# Patient Record
Sex: FEMALE | Race: WHITE | NOT HISPANIC OR LATINO | Employment: FULL TIME | URBAN - METROPOLITAN AREA
[De-identification: names, ages, dates, MRNs, and addresses within clinical notes are randomized per-mention and may not be internally consistent; named-entity substitution may affect disease eponyms.]

---

## 2017-05-04 ASSESSMENT — ENCOUNTER SYMPTOMS
MEMORY LOSS: 0
PARALYSIS: 0
CONSTIPATION: 0
DISTURBANCES IN COORDINATION: 0
SEIZURES: 0
RECTAL PAIN: 0
WEAKNESS: 0
TINGLING: 1
BLOATING: 1
VOMITING: 0
JAUNDICE: 0
TREMORS: 0
RECTAL BLEEDING: 0
LOSS OF CONSCIOUSNESS: 0
ABDOMINAL PAIN: 1
HEADACHES: 0
HEARTBURN: 0
DIARRHEA: 0
NAUSEA: 0
NUMBNESS: 1
BLOOD IN STOOL: 0
BOWEL INCONTINENCE: 0
SPEECH CHANGE: 0
DIZZINESS: 0

## 2017-05-17 ASSESSMENT — ENCOUNTER SYMPTOMS
NECK PAIN: 0
SKIN CHANGES: 0
JOINT SWELLING: 0
DISTURBANCES IN COORDINATION: 0
NAIL CHANGES: 0
WEAKNESS: 0
HEARTBURN: 0
ALTERED TEMPERATURE REGULATION: 0
LIGHT-HEADEDNESS: 0
CLAUDICATION: 0
NUMBNESS: 1
TREMORS: 0
DIARRHEA: 0
FEVER: 0
EYE IRRITATION: 0
SPUTUM PRODUCTION: 0
FATIGUE: 0
HEMATURIA: 0
JAUNDICE: 0
DECREASED APPETITE: 0
BOWEL INCONTINENCE: 0
SEIZURES: 0
FLANK PAIN: 0
SHORTNESS OF BREATH: 0
HYPERTENSION: 0
NECK MASS: 0
MYALGIAS: 0
NAUSEA: 0
VOMITING: 0
LEG PAIN: 0
BREAST PAIN: 0
INCREASED ENERGY: 0
LOSS OF CONSCIOUSNESS: 0
DOUBLE VISION: 0
PALPITATIONS: 0
CONSTIPATION: 0
PANIC: 0
BREAST MASS: 0
COUGH DISTURBING SLEEP: 0
RECTAL PAIN: 0
ORTHOPNEA: 0
SNORES LOUDLY: 0
EXERCISE INTOLERANCE: 0
HALLUCINATIONS: 0
SORE THROAT: 0
BRUISES/BLEEDS EASILY: 0
HOT FLASHES: 0
RECTAL BLEEDING: 0
POLYPHAGIA: 0
HOARSE VOICE: 0
HEADACHES: 0
MUSCLE WEAKNESS: 0
BACK PAIN: 0
MEMORY LOSS: 0
TINGLING: 1
WEIGHT GAIN: 0
HYPOTENSION: 0
TASTE DISTURBANCE: 0
POOR WOUND HEALING: 0
SLEEP DISTURBANCES DUE TO BREATHING: 0
PARALYSIS: 0
DYSPNEA ON EXERTION: 0
EYE PAIN: 0
WEIGHT LOSS: 0
SPEECH CHANGE: 0
CHILLS: 0
SYNCOPE: 0
DECREASED CONCENTRATION: 0
SINUS CONGESTION: 0
LEG SWELLING: 0
TROUBLE SWALLOWING: 0
SMELL DISTURBANCE: 0
POSTURAL DYSPNEA: 0
INSOMNIA: 0
DIFFICULTY URINATING: 0
POLYDIPSIA: 0
EYE REDNESS: 0
RESPIRATORY PAIN: 0
COUGH: 0
DECREASED LIBIDO: 0
DEPRESSION: 0
TACHYCARDIA: 0
DIZZINESS: 0
DYSURIA: 0
NIGHT SWEATS: 0
ARTHRALGIAS: 0
NERVOUS/ANXIOUS: 0
STIFFNESS: 0
WHEEZING: 0
MUSCLE CRAMPS: 0
SINUS PAIN: 0
SWOLLEN GLANDS: 0
EYE WATERING: 0
HEMOPTYSIS: 0
BLOOD IN STOOL: 0

## 2017-05-18 ENCOUNTER — ONCOLOGY VISIT (OUTPATIENT)
Dept: ONCOLOGY | Facility: CLINIC | Age: 32
End: 2017-05-18
Attending: NURSE PRACTITIONER
Payer: COMMERCIAL

## 2017-05-18 VITALS
DIASTOLIC BLOOD PRESSURE: 82 MMHG | HEART RATE: 83 BPM | SYSTOLIC BLOOD PRESSURE: 123 MMHG | TEMPERATURE: 98.1 F | BODY MASS INDEX: 22.82 KG/M2 | RESPIRATION RATE: 16 BRPM | WEIGHT: 154.1 LBS | HEIGHT: 69 IN | OXYGEN SATURATION: 97 %

## 2017-05-18 DIAGNOSIS — C53.9 MALIGNANT NEOPLASM OF CERVIX, UNSPECIFIED SITE (H): Primary | ICD-10-CM

## 2017-05-18 PROCEDURE — 99213 OFFICE O/P EST LOW 20 MIN: CPT | Mod: ZP | Performed by: NURSE PRACTITIONER

## 2017-05-18 PROCEDURE — 88175 CYTOPATH C/V AUTO FLUID REDO: CPT | Performed by: NURSE PRACTITIONER

## 2017-05-18 PROCEDURE — 99212 OFFICE O/P EST SF 10 MIN: CPT | Mod: ZF

## 2017-05-18 ASSESSMENT — PAIN SCALES - GENERAL: PAINLEVEL: NO PAIN (0)

## 2017-05-18 ASSESSMENT — ENCOUNTER SYMPTOMS
BLOATING: 0
ABDOMINAL PAIN: 0

## 2017-05-18 NOTE — LETTER
2017       RE: Erika Ziegler  [address]  [facility] [address]     Dear Colleague,    Thank you for referring your patient, Erika Ziegler, to the [facility]. Please see a copy of my visit note below.                Follow Up Notes on Referred Patient    Date: 2017       Dr. Saritha Rodney MD  [facility]  [address]  [place] [address]       RE: Erika Ziegler  : 1985  HUMBERTO: 2017    Dear Dr. 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 She is here today for a surveillance visit and annual pap.      Course to date:   Erika had some post coital bleeding and was seen by Dr. iSlva for her annual visit. On pelvic examination she was noted to have a friable cervical mass for which a pap smear was obtained. She then underwent a colposcopy with biopsies taken with above diagnosis made. She would like to have children in the future and the current plan is to preserve her ovaries and undergo oocyte and/embryo preservation with surrogate carrier.   Pap/colpo history:   5/10/4: NIL   05: LSIL   3/2006 colpo with mild dysplasia   06: LSIL pap   07: ASCUS +HPV   07: LSIL   2008: colpo with mild dysplasia   08 & 2009: LSIL   2009: ANDRZEJ I-II   3/2010: colpo ANDRZEJ I   11/5/10, 11, 12: NIL   13: ASC-H, JERRI   2013: colpo with poorly differentiated carcinoma with neuroendocrine differentiation and partial tumor necrosis   13: PET CT with 6.5 x 5.2 cm cervical mass; tiny focus of increased metabolism in the right midpelvis laterally. The ureter at this level is difficult to follow. This may represent some activity in the distal ureter through it is difficult to completely  exclude activity in a small non enlarged pelvic lymph node. Chest lear, no other evidence of mets.   5/1/13: Met with reproductive endocrinology to discuss fertility options. Per their recommendation, given the cervical cancer diagnosis and the size of the lesion, the patient is not a suitable candidate for transvaginal oocyte retrieval. Transabdominal ultrasound was performed in addition to transvaginal ultrasound, to evaluate for possible transabdominal oocyte retrieval, with ovaries positioned low in the pelvis precluding safe transabdominal oocyte retrieval. Discussed option of ovarian tissue cryopreservation and ovarian translocation prior to radiation therapy.   5/1/13: MR PELVIS IMPRESSION:  1. 5.1 x 3.1 x 5.9 cm enhancing cervical mass extending of the posterior aspect of the cervix not involving the vagina or uterus no evidence of parametrial spread.  2. Two small nonspecific pelvic lymph nodes, 1.0 x 0.6 cm left pelvic lymph node series 6 image 6, 0.6 x 1.0 cm right pelvic lymph node on image 6.  5/13/13: laparoscopy, left ovarian transposition, right salpingo oophorectomy (reproductive medicine taking right ovary after surgery)   5/20/13-6/17/13: Cycle #1-3 Etoposide/Cisplatin; began EBRT   6/24/13: Insertion of High Dose Rate Tandem and Ring for Iridium-192 implant. Pt tolerated well.   7/9/13: Completed brachytherapy  8/2/13-8/21/13: Cycle #1-2 Taxol/Carbo  9/11/13 PET/CT IMPRESSION:  1. No evidence of FDG avid malignancy in the neck, chest, abdomen, or pelvis.  2. Inflammatory changes in the presacral and perirectal spaces, likely post radiation change.  9/13/13-10/4/13: Cycle #3-4 Taxol/Carbo  12/16/13: PET/CT IMPRESSION:  1. No evidence of hypermetabolic activity within the neck, chest, abdomen, or pelvis to suggest active or metastatic disease.  2. Persistent inflammatory changes within the low pelvis, most compatible with posttreatment change, without associated hypermetabolic activity to suggest  "local recurrence.  12/18/13: recovering relatively well from treatment. She still has neuropathy in her feet. It is constant, annoying tingling and numbness in her toes. She has some relief with gabapentin and B6/L-glutamine. She also still has some \"digestive upset\" since finishing radiation therapy, has diarrhea especially in the morning. She also had some chest discomfort last week, but this has since resolved and she attributes it to anxiety after meeting a patient with similar cancer to hers that has metastasized to lungs. She also continues to have some bleeding and discomfort with use of vaginal dilator. She still takes OCPs and has NOT been skipping sugar pill week. She does not feel she experiences any menopausal symptoms on these off weeks but has also not been monitoring it closely. Amenorrhea still.   3/17/14: CT C/A/P IMPRESSION: No CT scan findings in the chest, abdomen, or pelvis to indicate recurrent or metastatic tumor. Unchanged mild free fluid in the pelvis.  3/19/14 pap NIL  6/10/14: CT C/A/P Impression:   1. No evidence of recurrent or metastatic cervical cancer in the chest, abdomen, or pelvis.   2. New subtle wall thickening of the small bowel loops in the low abdomen, likely sequelae of prior radiation.   3. Stable nonspecific pulmonary nodules measuring up to 3 mm since at least 9/11/2013. Continued followup recommended until 12 month stability is documented. No new pulmonary nodule.   6/11/14: Pap NIL.  9/2/14: Pap NIL, HPV negative. CT cap showed: No evidence of recurrence or metastatic lesion in chest, abdomen, or pelvis. Stable sub-4 mm pulmonary nodules.  12/2/14: Pap LSIL, HPV negative. CT cap showed: Impression:   1. No evidence of recurrent local or metastatic disease in the chest, abdomen, or pelvis.  2. Stable sub-4 mm pulmonary nodule on the left. No new pulmonary nodules.  2/17/15: CT C/A/P: IMPRESSION:   1. Left lower lobe 2 mm nodule unchanged since initial imaging on " 9/11/2013.  2. No evidence of recurrent local or metastatic disease in the chest, abdomen, or pelvis.  3/5/15: Pap ASC-H, LSIL also present, HPV 18 postive.    4/4/15: ED visit for 4 days of abdominal pain, increasing in severity with increased frequency of urination and bowel movements over the past 2 days as well. She also complains of abdominal distention. Gyn onc consult in hospital did not recommend CT at that time unless symptoms worsen. Discharged same day.   XRay abdomen: Nonobstructive bowel gas pattern. No free intraperitoneal air.     4/16/15: Colpo done. No aceto-white changes identified. No biopsies done. Plan to repeat pap in June as well as CT.      6/9/15: CT cap verbal preliminary report by Dr. Eric is no change in pulmonary nodules and no evidence of recurrence/metastatic disease. Pap pending.      Addendum: CT cap IMPRESSION:   1. Indeterminate 12 mm hypodensity within the uterine fundus may represent fluid within the endometrial canal secondary to cervical stenosis. Underlying uterine lesion not excluded. Initially, a dedicated pelvic ultrasound is recommended for further assessment.  2. No evidence of metastatic disease.  3. 2 mm nodules in the left lung are unchanged since at least 9/11/2013, and are statistically benign.      Plan for U/S for further evaluation of uterus.     7/2/15: U/S results pending. Verbal report per Dr. Guajardo shows a solid fibroid like area which is not fluid; recommend f/u with additional imaging.      U/S final IMPRESSION:   1. Relatively well-defined small mixed echogenicity myometrial mass in the uterine fundus. Fibroid could have this appearance, however given patient's history of cervical malignancy and radiation, consider a 3-6 month followup to ensure stability.  2. Ovaries are not identified.  3. Trace free fluid in the pelvis.     9/1/15: CT cap IMPRESSION:    1. No evidence of metastatic disease in the chest, abdomen, or pelvis.  2. Stable uterine fundus  hypodensity most consistent with submucosal fibroid as described on pelvic ultrasound 7/2/2015. However, given the patient's history of cervical cancer, short-term followup ultrasound in 3-6 months is recommended.  3. Postsurgical changes of right salpingo-oophorectomy and left  Salpingectomy.     9/1/15: pap NIL, neg HPV.      12/4/15: pelvic ultrasound FINDINGS:  The uterus measures 5.3 x 3.4 x 2.0 cm. The endometrium is within normal limits and measures 2.0 mm. There is no free fluid in the pelvis. Redemonstration of a heterogeneous appearing circumscribed submucosal mass in the uterine fundus measuring 10 x 8 x 6 mm, previously 8 x 13 x 9 mm. Unchanged calcification in the lower uterine segment. No new masses are identified. The right ovary is surgically absent. The left ovary was not visualized. No adnexal masses.  No focal abnormality of the visualized portions of the bladder.  IMPRESSION:    Mild decrease in the submucosal mass in the uterine fundus from 7/2/2015, which most likely represents a benign fibroid. No other suspicious masses are identified.     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      6//2016: Pap NIL. 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     12/6/16: CT cap IMPRESSION:   1. In this patient with history of cervical cancer there is no evidence of recurrent or metastatic disease in the chest, abdomen and  pelvis.   2. Tiny pulmonary nodules are unchanged since 9/11/2013.    5/18/17: Pap pending.       Today she comes to clinic reporting she was been having some abdominal issues about 3 weeks ago but that has resolved. She denies any vaginal bleeding, no changes in  her bowel or bladder habits, no nausea/emesis, no lower extremity edema, and no difficulties eating or sleeping. She denies any abdominal discomfort/bloating, no fevers or chills, and no chest pain or shortness of breath. She is using her dilator about once every 3-4 weeks and is intermittently sexually active; she does have some discomfort with this given the shortened length of the vagina. She is using a lubricant and does try to change positions which does help. She tapered down on her Gabapentin and reports her neuropathy did not worsen; she has previously tried Lyrica which did not help. She looked in to acupuncture and laser light therapy but this was cost prohibitive. She was taking B6 and L-glutamine but is not now. She and her  are just starting the adoption process again now that their son, Jeremiah, is one. She continues to take OCP and has questions about why she is on this.        Review of Systems     Constitutional:  Negative for fever, chills, weight loss, weight gain, fatigue, decreased appetite, night sweats, recent stressors, height gain, height loss, post-operative complications, incisional pain, hallucinations, increased energy, hyperactivity and confused.   HENT:  Negative for ear pain, hearing loss, tinnitus, nosebleeds, trouble swallowing, hoarse voice, mouth sores, sore throat, ear discharge, tooth pain, gum tenderness, taste disturbance, smell disturbance, hearing aid, bleeding gums, dry mouth, sinus pain, sinus congestion and neck mass.    Eyes:  Negative for double vision, pain, redness, eye pain, decreased vision, eye watering, eye bulging, eye dryness, flashing lights, spots, floaters, strabismus, tunnel vision, jaundice and eye irritation.   Respiratory:   Negative for cough, hemoptysis, sputum production, shortness of breath, wheezing, sleep disturbances due to breathing, snores loudly, respiratory pain, dyspnea on exertion, cough disturbing sleep and postural dyspnea.     Cardiovascular:  Negative for chest pain, dyspnea on exertion, palpitations, orthopnea, claudication, leg swelling, fingers/toes turn blue, hypertension, hypotension, syncope, history of heart murmur, chest pain on exertion, chest pain at rest, pacemaker, few scattered varicosities, leg pain, sleep disturbances due to breathing, tachycardia, light-headedness, exercise intolerance and edema.   Gastrointestinal:  Negative for heartburn, nausea, vomiting, abdominal pain, diarrhea, constipation, blood in stool, melena, rectal pain, bloating, hemorrhoids, bowel incontinence, jaundice, rectal bleeding, coffee ground emesis and change in stool.   Genitourinary:  Negative for bladder incontinence, dysuria, urgency, hematuria, flank pain, vaginal discharge, difficulty urinating, genital sores, dyspareunia, decreased libido, nocturia, voiding less frequently, arousal difficulty, abnormal vaginal bleeding, excessive menstruation, menstrual changes, hot flashes, vaginal dryness and postmenopausal bleeding.   Musculoskeletal:  Negative for myalgias, back pain, joint swelling, arthralgias, stiffness, muscle cramps, neck pain, bone pain, muscle weakness and fracture.   Skin:  Negative for nail changes, itching, poor wound healing, rash, hair changes, skin changes, acne, warts, poor wound healing, scarring, flaky skin, Raynaud's phenomenon, sensitivity to sunlight and skin thickening.   Neurological:  Positive for tingling and numbness. Negative for dizziness, tremors, speech change, seizures, loss of consciousness, weakness, light-headedness, headaches, disturbances in coordination, memory loss, difficulty walking and paralysis.   Endo/Heme:  Negative for anemia, swollen glands and bruises/bleeds easily.   Psychiatric/Behavioral:  Negative for depression, hallucinations, memory loss, decreased concentration, mood swings and panic attacks.    Breast:  Negative for breast discharge, breast mass, breast pain and nipple retraction.    Endocrine:  Negative for altered temperature regulation, polyphagia, polydipsia, unwanted hair growth and change in facial hair.          Past Medical History:    Past Medical History:   Diagnosis Date     Cervix cancer (H) 4/2013    neuroendocrine         Past Surgical History:    Past Surgical History:   Procedure Laterality Date     BIOPSY       EXAM UNDER ANESTHESIA, INSERT JOESPH SLEEVE, UTERINE PLACEMENT OF TANDEM AND RING FOR RAD, ULTRASOUND  6/24/2013    Procedure: EXAM UNDER ANESTHESIA, INSERT JOESPH SLEEVE, UTERINE PLACEMENT OF TANDEM AND RING FOR RADIATION, ULTRASOUND GUIDED;  Pelvic Exam, Insert JOESPH Sleeve with Ultrasound Guidance and Place Tandem Ring for High Dose Radiation;  Surgeon: Roxy Brar MD;  Location: [facility] OR     LAPAROSCOPIC SALPINGO-OOPHORECTOMY  5/13/2013    Procedure: LAPAROSCOPIC SALPINGO-OOPHORECTOMY;  Laparoscopy, Left  salpingectomy,Ovarian Transposition, Right Salpingo Oophorectomy , Anesthesia General with block;  Surgeon: Deanna Salinas MD;  Location: [facility] OR     wisdom teeth           Health Maintenance Due   Topic Date Due     TETANUS IMMUNIZATION (SYSTEM ASSIGNED)  10/25/2003       Current Medications:     Current Outpatient Prescriptions   Medication Sig Dispense Refill     ALPRAZolam (XANAX) 0.25 MG tablet Take 1 tablet (0.25 mg) by mouth 3 times daily as needed for anxiety 30 tablet 0     norgestrel-ethinyl estradiol (LO/OVRAL) 0.3-30 MG-MCG per tablet Take 1 tablet by mouth daily 28 tablet 11     gabapentin (NEURONTIN) 300 MG capsule Take 2 capsules (600 mg) by mouth 4 times daily 240 capsule 11     Cholecalciferol (VITAMIN D PO) Take 1,000 mg by mouth daily       pyridoxine (VITAMIN B-6) 100 MG tablet Take 100 mg by mouth 2 times daily       L-Glutamine 500 MG TABS Take 1,000 mg by mouth 2 times daily           Allergies:      No Known Allergies     Social History:     Social History   Substance Use Topics     Smoking status: Never Smoker     Smokeless tobacco: Never  "Used     Alcohol use 0.0 oz/week     0 Standard drinks or equivalent per week      Comment: 3 per week       History   Drug Use No         Family History:       Family History   Problem Relation Age of Onset     CANCER Maternal Grandfather      leukemia     Breast Cancer No family hx of      Cancer - colorectal No family hx of          Physical Exam:     /82  Pulse 83  Temp 98.1  F (36.7  C) (Oral)  Resp 16  Ht 1.753 m (5' 9\")  Wt 69.9 kg (154 lb 1.6 oz)  SpO2 97%  BMI 22.76 kg/m2  Body mass index is 22.76 kg/(m^2).    General Appearance: healthy and alert, no distress     HEENT: no thyromegaly, no palpable nodules or masses        Cardiovascular: regular rate and rhythm, no gallops, rubs or murmurs     Respiratory: lungs clear, no rales, rhonchi or wheezes, normal diaphragmatic excursion    Musculoskeletal: extremities non tender and without edema    Skin: no lesions or rashes     Neurological: normal gait, no gross defects     Psychiatric: appropriate mood and affect                               Hematological: normal cervical, supraclavicular and inguinal lymph nodes     Gastrointestinal:       abdomen soft, non-tender, non-distended, no organomegaly or masses    Genitourinary: External genitalia and urethral meatus appears normal.  Vagina is smooth without nodularity or masses; foreshortened.  Cervix not clearly visualized. Bimanual exam reveal no masses, nodularity or fullness.  Recto-vaginal exam confirms these findings. Pap collected.       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 She is here today for a surveillance visit and annual pap.     20 minutes were spent with this patient, over 50% of that time was spent in symptom management, treatment planning and in counseling and " coordination of care.    Plan:     1.)        Patient to continue to be seen every 6 months for surveillance until 10/2018. Her annual pap was collected today. Discussed our group no longer obtains HPV testing in women with cervical cancer. Reviewed recommendations from [facility] against performing colposcopy in patients treated for cervical cancer with Pap tests of LSIL or less. Colposcopy for LSIL in this group does not detect recurrence unless there is a visible lesion.  Will plan in imaging in about 3 months as this will be 9 months since her last imaging was done; she will call to schedule this. Reviewed signs and symptoms for when she should contact the clinic or seek additional care. Patient to contact the clinic with any questions or concerns in the interim.     2.) Discussed rationale for being on OCPs and answered all of her questions to the best of my ability.     3.) Labs and/or tests ordered include:  Pap. CT cap.      4.) Health maintenance issues addressed today include annual health maintenance and non-gynecologic issues with PCP.    AMOL Whittaker, WHNP-BC, ANP-BC  Women's Health Nurse Practitioner  Adult Nurse Pracitioner  Gynecologic Oncology    CC  Patient Care Team:  Saritha Rodney MD as PCP - General (Family Practice)

## 2017-05-18 NOTE — MR AVS SNAPSHOT
After Visit Summary   5/18/2017    Erika Ziegler    MRN: [id number]           Patient Information     Date Of Birth          1985        Visit Information        Provider Department      5/18/2017 10:30 AM Cheyanne Burleson APRN CNP [facility]        Today's Diagnoses     Malignant neoplasm of cervix, unspecified site (H)    -  1       Follow-ups after your visit        Follow-up notes from your care team     Return in about 6 months (around 11/18/2017).      Future tests that were ordered for you today     Open Future Orders        Priority Expected Expires Ordered    CT Chest/Abdomen/Pelvis w Contrast Routine  5/18/2018 5/18/2017            Who to contact     If you have questions or need follow up information about today's clinic visit or your schedule please contact [facility] directly at [phone].  Normal or non-critical lab and imaging results will be communicated to you by payasUgymhart, letter or phone within 4 business days after the clinic has received the results. If you do not hear from us within 7 days, please contact the clinic through payasUgymhart or phone. If you have a critical or abnormal lab result, we will notify you by phone as soon as possible.  Submit refill requests through [employer] or call your pharmacy and they will forward the refill request to us. Please allow 3 business days for your refill to be completed.          Additional Information About Your Visit        MyChart Information     [employer] gives you secure access to your electronic health record. If you see a primary care provider, you can also send messages to your care team and make appointments. If you have questions, please call your primary care clinic.  If you do not have a primary care provider, please call [phone] and they will assist you.        Care EveryWhere ID     This is your Care EveryWhere ID. This could be used by other organizations to access your  "[place] medical records  [id number]        Your Vitals Were     Pulse Temperature Respirations Height Pulse Oximetry BMI (Body Mass Index)    83 98.1  F (36.7  C) (Oral) 16 1.753 m (5' 9\") 97% 22.76 kg/m2       Blood Pressure from Last 3 Encounters:   05/18/17 123/82   12/06/16 130/88   06/15/16 122/82    Weight from Last 3 Encounters:   05/18/17 69.9 kg (154 lb 1.6 oz)   12/06/16 69.7 kg (153 lb 9.6 oz)   06/15/16 68 kg (150 lb)              We Performed the Following     Pap imaged thin layer diagnostic only        Primary Care Provider Office Phone # Fax #    Saritha Rodney [phone]       [facility] [address] [facility] [address]  [facility] [address]        Thank you!     Thank you for choosing [facility]  for your care. Our goal is always to provide you with excellent care. Hearing back from our patients is one way we can continue to improve our services. Please take a few minutes to complete the written survey that you may receive in the mail after your visit with us. Thank you!             Your Updated Medication List - Protect others around you: Learn how to safely use, store and throw away your medicines at www.disposemymeds.org.          This list is accurate as of: 5/18/17 11:17 AM.  Always use your most recent med list.                   Brand Name Dispense Instructions for use    ALPRAZolam 0.25 MG tablet    XANAX    30 tablet    Take 1 tablet (0.25 mg) by mouth 3 times daily as needed for anxiety       gabapentin 300 MG capsule    NEURONTIN    240 capsule    Take 2 capsules (600 mg) by mouth 4 times daily       L-Glutamine 500 MG Tabs      Take 1,000 mg by mouth 2 times daily       norgestrel-ethinyl estradiol 0.3-30 MG-MCG per tablet    LO/OVRAL    28 tablet    Take 1 tablet by mouth daily       pyridoxine 100 MG tablet    VITAMIN B-6     Take 100 mg by mouth 2 times daily       VITAMIN D PO      Take 1,000 mg by mouth daily         "

## 2017-05-18 NOTE — NURSING NOTE
"Oncology Rooming Note    May 18, 2017 10:39 AM   Erika Ziegler is a 31 year old female who presents for:    Chief Complaint   Patient presents with     Oncology Clinic Visit     return patient visit for 6 month f/u related to Cervical ca (H)     Initial Vitals: /82  Pulse 83  Temp 98.1  F (36.7  C) (Oral)  Resp 16  Ht 1.753 m (5' 9\")  Wt 69.9 kg (154 lb 1.6 oz)  SpO2 97%  BMI 22.76 kg/m2 Estimated body mass index is 22.76 kg/(m^2) as calculated from the following:    Height as of this encounter: 1.753 m (5' 9\").    Weight as of this encounter: 69.9 kg (154 lb 1.6 oz). Body surface area is 1.84 meters squared.  No Pain (0) Comment: Data Unavailable   No LMP recorded. Patient is not currently having periods (Reason: UNKNOWN).  Allergies reviewed: Yes  Medications reviewed: Yes    Medications: Medication refills not needed today.  Pharmacy name entered into mEgo:    [id number] - [place], [place] - [address] [facility] - [place], [place] - [address] [facility] - [place], [place] - [address]    Clinical concerns: none floyd khanna was notified.    5 minutes for nursing intake (face to face time)     Brett Thomas CMA              "

## 2017-05-22 LAB
COPATH REPORT: NORMAL
PAP: NORMAL

## 2017-05-25 DIAGNOSIS — F41.9 ANXIETY: ICD-10-CM

## 2017-05-25 RX ORDER — ALPRAZOLAM 0.25 MG
0.25 TABLET ORAL 3 TIMES DAILY PRN
Qty: 30 TABLET | Refills: 0 | Status: SHIPPED | OUTPATIENT
Start: 2017-05-25 | End: 2018-08-08

## 2017-05-25 NOTE — TELEPHONE ENCOUNTER
[facility] calling for refill of alprazolam for the patient  Last visit with MD 5/18/17  Last order date    ALPRAZolam (XANAX) 0.25 MG tablet 30 tablet 0 12/6/2016  --   Sig: Take 1 tablet (0.25 mg) by mouth 3 times daily as needed for anxiety     Please advise on refills for patient

## 2017-06-13 DIAGNOSIS — C53.9 MALIGNANT NEOPLASM OF CERVIX, UNSPECIFIED SITE (H): ICD-10-CM

## 2017-06-27 DIAGNOSIS — C53.1 MALIGNANT NEOPLASM OF EXOCERVIX (H): ICD-10-CM

## 2017-07-03 RX ORDER — GABAPENTIN 300 MG/1
CAPSULE ORAL
Qty: 240 CAPSULE | Refills: 11 | OUTPATIENT
Start: 2017-07-03

## 2017-07-10 ENCOUNTER — MYC MEDICAL ADVICE (OUTPATIENT)
Dept: NEUROLOGY | Facility: CLINIC | Age: 32
End: 2017-07-10

## 2017-07-10 ENCOUNTER — MYC REFILL (OUTPATIENT)
Dept: ONCOLOGY | Facility: CLINIC | Age: 32
End: 2017-07-10

## 2017-07-10 DIAGNOSIS — G62.9 NEUROPATHY: Primary | ICD-10-CM

## 2017-07-10 DIAGNOSIS — C53.9 MALIGNANT NEOPLASM OF CERVIX, UNSPECIFIED SITE (H): ICD-10-CM

## 2017-07-10 DIAGNOSIS — C53.1 MALIGNANT NEOPLASM OF EXOCERVIX (H): ICD-10-CM

## 2017-07-10 NOTE — TELEPHONE ENCOUNTER
Message from [employer]:  Original authorizing provider: AMOL Tomlinson CNP    Erika Ziegler would like a refill of the following medications:  norgestrel-ethinyl estradiol (LO/OVRAL) 0.3-30 MG-MCG per tablet [AMOL Tomlinson CNP]    Preferred pharmacy: [facility], [facility], [place] - [address] [place] [address]    Comment:  Magdi Edward, Can you renew this for the year? I know that's what Dr. Salinas did so the pharmacy didn't have to call every month. Thanks, hope you're doing great! Chas

## 2017-07-10 NOTE — TELEPHONE ENCOUNTER
patient calling for refill of loovral for the patient  Last visit with MD 5/18/17  Last order date    norgestrel-ethinyl estradiol (LO/OVRAL) 0.3-30 MG-MCG per tablet 28 tablet 11 6/13/2017  No      Sig: Take 1 tablet by mouth daily     Please advise on refills for patient

## 2017-07-14 NOTE — TELEPHONE ENCOUNTER
Patient has an appointment with  10/5/17 and will need GBP refilled through that time.  Current prescription was written 6/28 for 60 days with 1 refill.  This should be adequate for patient assuming no dose changes

## 2017-07-24 DIAGNOSIS — C53.9 CERVICAL CANCER (H): Primary | ICD-10-CM

## 2017-09-26 ENCOUNTER — PRE VISIT (OUTPATIENT)
Dept: NEUROLOGY | Facility: CLINIC | Age: 32
End: 2017-09-26

## 2017-09-26 NOTE — TELEPHONE ENCOUNTER
1.  Date/reason for appt:  10/05/17   Neuropathy    2.  Referring provider:  Dr Babcock, Internal    3.  Call to patient (Yes / No - short description):  No, referred    Records reviewed.  All records are in Epic

## 2017-10-02 DIAGNOSIS — C53.1 MALIGNANT NEOPLASM OF EXOCERVIX (H): ICD-10-CM

## 2017-10-02 RX ORDER — GABAPENTIN 300 MG/1
CAPSULE ORAL
Qty: 240 CAPSULE | Refills: 1 | Status: CANCELLED | OUTPATIENT
Start: 2017-10-02

## 2017-10-02 NOTE — TELEPHONE ENCOUNTER
last appointment 06/15/16  next appointment 10/05/17 with Dr Villareal (transferring   Will be out of medication on Wed

## 2017-10-04 ENCOUNTER — MYC MEDICAL ADVICE (OUTPATIENT)
Dept: ONCOLOGY | Facility: CLINIC | Age: 32
End: 2017-10-04

## 2017-10-05 ENCOUNTER — OFFICE VISIT (OUTPATIENT)
Dept: NEUROLOGY | Facility: CLINIC | Age: 32
End: 2017-10-05

## 2017-10-05 VITALS
WEIGHT: 154 LBS | HEART RATE: 81 BPM | SYSTOLIC BLOOD PRESSURE: 129 MMHG | HEIGHT: 69 IN | DIASTOLIC BLOOD PRESSURE: 80 MMHG | BODY MASS INDEX: 22.81 KG/M2

## 2017-10-05 DIAGNOSIS — C53.1 MALIGNANT NEOPLASM OF EXOCERVIX (H): ICD-10-CM

## 2017-10-05 DIAGNOSIS — T45.1X5A CHEMOTHERAPY-INDUCED NEUROPATHY (H): Primary | ICD-10-CM

## 2017-10-05 DIAGNOSIS — G62.0 CHEMOTHERAPY-INDUCED NEUROPATHY (H): Primary | ICD-10-CM

## 2017-10-05 RX ORDER — GABAPENTIN 300 MG/1
600 CAPSULE ORAL 3 TIMES DAILY
Qty: 540 CAPSULE | Refills: 1 | Status: SHIPPED | OUTPATIENT
Start: 2017-10-05 | End: 2019-09-03

## 2017-10-05 ASSESSMENT — PAIN SCALES - GENERAL: PAINLEVEL: MILD PAIN (3)

## 2017-10-05 NOTE — LETTER
10/5/2017       RE: Erika Ziegler  [address]  [facility] [address]     Dear Colleague,    Thank you for referring your patient, Erika Ziegler, to the [facility] NEUROLOGY at [facility]. Please see a copy of my visit note below.    2017        Shima Babcock MD   [facility]    [address]  [facility] [address]   [place] [id number]      Saritha Rodney MD   [facility]    [address] [facility] [address]   [place] [id number]      Cheyanne Burleson, APRN, CNP   [facility]    [address] [place] [address]      Deanna Salinas MD   [facility]    [address] [facility] [address]   [place] [id number]      RE: Erika Ziegler    MRN: [id number]    : 1985              Dear Rashaun Mark Ghebre and Ms. Burleson:      I had the pleasure to see Ms. Ziegler at the [facility] today.  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  She has a history of cervical cancer, status post radiation and chemotherapy, which she completed at the end of .  She was treated with 4 cycles of Taxol and carboplatin.  She noticed the symptoms of neuropathy at the end of the last cycle, in 2013.  They consisted of numbness, tingling and pain in her hands and feet. The numbness and tingling in hands disappeared a few months after stopping chemotherapy, but the paresthesias in her feet persisted. Her symptoms are generally well controlled on gabapentin 600 mg 4 times a day.  She has a frequent achy sensation in her feet.  She does not always remember to take it 4 times a day so in the last few months she has reduced it to 3 times a day.  However, as long as she takes her gabapentin, she can sleep comfortably and she does not seem to have major limitations in her activities of daily living.  She does not have any autonomic symptoms such as orthostatic hypotension, fainting,  gastroparesis, etc.  She has issues with bowel and bladder control, which she relates to her radiation therapy.  She does not drop things from her her hands and denies weakness, balance problems, or falls.  She had mild balance difficulties at the end of 2013.  She feels her symptoms have improved compared to then.   She drinks 2-3 alcoholic drinks a week at most.  She does not smoke.  She does not have diabetes.  There is no family history of neuropathy.  She previously tried Lyrica which caused side effects related to mood and confusion.  She has not been on Cymbalta, Effexor, nortriptyline or other antidepressants.  She took multivitamins, vitamin B6 and L-glutamine at the instruction of her oncologist, but she does not believe they made any difference to her neuropathy.      PAST MEDICAL HISTORY, ALLERGIES, FAMILY HISTORY, SOCIAL HISTORY, REVIEW OF SYSTEMS, AND MEDICATIONS:  As outlined in previous Epic notes.        On physical exam, her blood pressure is 129/80, pulse 81 and regular, weight 69.9 kg, height is 175, pain is 3 out of 10, BMI 22.74.  On neuro exam, she is awake, alert, oriented x3.  Cranial nerve examination II-XII is normal.  Motor exam shows normal strength, tone and muscle bulk throughout, except for inability to abduct her toes.  Reflexes are 2-3 brisk in the upper extremities, 3 at the knees with crossed adductor response and 2 and easily elicitable at the ankles.  Plantar responses are bilaterally neutral.  Vibration is mildly reduced at the toes and the ankles- 8 seconds at the toes bilaterally and 9 at the ankles, normal at the index fingers, 21 seconds on the right and 23 at the left.  Position sensations is normal at toes and fingers.  Pinprick is reduced at the tips of the toes compared to the proximal foot, but it is normal from the ankle up.  Pinprick and tactile sensation is normal at the tips of the fingers.  Coordination is intact in finger-to-nose.  There is no tremor or  adventitious movement.  She is able to rise from a chair with arms crossed on the chest without help.  Romberg is negative and she can do 4-5 steps of tandem with fairly good stability.        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  Her symptoms appear, in my opinion, fairly well controlled on gabapentin 600 mg and she does not need to take it 4 times a day. 3 times is enough. She wonders if she could taper the gabapentin.  I explained to her that she could try so by reducing it by 1 capsule a week, until she finds the minimal dose that is required to keep her symptoms under control.  Of course, there is no secure way to predict whether a lower dose of gabapentin will be sufficient or not, and the patient can experiment herself. I do not have any additional suggestions for her treatment.  I told her that there is no agent, whether vitamin B6, glutamine or other, that is known to reverse the course of established chemotherapy neuropathy.  She seems to understand that.  I will see her in followup as necessary.  I refilled her gabapentin until 2017.  From there on, I would kindly request that her oncologist or her primary care doctor further refills that drug.  I am happy to see her in the future if she has any worsening symptoms related to her neuropathy.      Thanks for allowing me to participate in her care. Total time spent for patient care 25 minutes; more than half was counseling.       Sincerely,        MD OLENA Anderson MD             D: 10/05/2017 08:24   T: 10/05/2017 09:08   MT: tb      Name:     JARRELL LYNCH   MRN:      [id number]        Account:      [id number]   :      1985           Service Date: 10/05/2017      Document: [id number]        Again, thank you for allowing me to participate in the care of your patient.      Sincerely,    Olena Villareal MD

## 2017-10-05 NOTE — PROGRESS NOTES
2017        Shima Babcock MD   [facility]    [address]  [facility] [address]   [place] [address]      Saritha Rodney MD   [facility]    [address] [facility] [address]   [place] [address]      Cheyanne Burleson, APRN, CNP   [facility]    [address] [place], [place] [id number]      Deanna Salinas MD   [facility]    [address] [facility] [address]   [place] [address]      RE: Erika Ziegler    MRN: [id number]    : 1985              Dear Rashaun Mark Ghebre and Ms. Burleson:      I had the pleasure to see Ms. Ziegler at the [facility] today.  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  She has a history of cervical cancer, status post radiation and chemotherapy, which she completed at the end of .  She was treated with 4 cycles of Taxol and carboplatin.  She noticed the symptoms of neuropathy at the end of the last cycle, in 2013.  They consisted of numbness, tingling and pain in her hands and feet. The numbness and tingling in hands disappeared a few months after stopping chemotherapy, but the paresthesias in her feet persisted. Her symptoms are generally well controlled on gabapentin 600 mg 4 times a day.  She has a frequent achy sensation in her feet.  She does not always remember to take it 4 times a day so in the last few months she has reduced it to 3 times a day.  However, as long as she takes her gabapentin, she can sleep comfortably and she does not seem to have major limitations in her activities of daily living.  She does not have any autonomic symptoms such as orthostatic hypotension, fainting, gastroparesis, etc.  She has issues with bowel and bladder control, which she relates to her radiation therapy.  She does not drop things from her her hands and denies weakness, balance problems, or falls.  She had mild balance difficulties at the end of .  She feels her symptoms  have improved compared to then.   She drinks 2-3 alcoholic drinks a week at most.  She does not smoke.  She does not have diabetes.  There is no family history of neuropathy.  She previously tried Lyrica which caused side effects related to mood and confusion.  She has not been on Cymbalta, Effexor, nortriptyline or other antidepressants.  She took multivitamins, vitamin B6 and L-glutamine at the instruction of her oncologist, but she does not believe they made any difference to her neuropathy.      PAST MEDICAL HISTORY, ALLERGIES, FAMILY HISTORY, SOCIAL HISTORY, REVIEW OF SYSTEMS, AND MEDICATIONS:  As outlined in previous Epic notes.        On physical exam, her blood pressure is 129/80, pulse 81 and regular, weight 69.9 kg, height is 175, pain is 3 out of 10, BMI 22.74.  On neuro exam, she is awake, alert, oriented x3.  Cranial nerve examination II-XII is normal.  Motor exam shows normal strength, tone and muscle bulk throughout, except for inability to abduct her toes.  Reflexes are 2-3 brisk in the upper extremities, 3 at the knees with crossed adductor response and 2 and easily elicitable at the ankles.  Plantar responses are bilaterally neutral.  Vibration is mildly reduced at the toes and the ankles- 8 seconds at the toes bilaterally and 9 at the ankles, normal at the index fingers, 21 seconds on the right and 23 at the left.  Position sensations is normal at toes and fingers.  Pinprick is reduced at the tips of the toes compared to the proximal foot, but it is normal from the ankle up.  Pinprick and tactile sensation is normal at the tips of the fingers.  Coordination is intact in finger-to-nose.  There is no tremor or adventitious movement.  She is able to rise from a chair with arms crossed on the chest without help.  Romberg is negative and she can do 4-5 steps of tandem with fairly good stability.        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  Her symptoms  appear, in my opinion, fairly well controlled on gabapentin 600 mg and she does not need to take it 4 times a day. 3 times is enough. She wonders if she could taper the gabapentin.  I explained to her that she could try so by reducing it by 1 capsule a week, until she finds the minimal dose that is required to keep her symptoms under control.  Of course, there is no secure way to predict whether a lower dose of gabapentin will be sufficient or not, and the patient can experiment herself. I do not have any additional suggestions for her treatment.  I told her that there is no agent, whether vitamin B6, glutamine or other, that is known to reverse the course of established chemotherapy neuropathy.  She seems to understand that.  I will see her in followup as necessary.  I refilled her gabapentin until 2017.  From there on, I would kindly request that her oncologist or her primary care doctor further refills that drug.  I am happy to see her in the future if she has any worsening symptoms related to her neuropathy.      Thanks for allowing me to participate in her care. Total time spent for patient care 25 minutes; more than half was counseling.       Sincerely,        MD OLENA Anderson MD             D: 10/05/2017 08:24   T: 10/05/2017 09:08   MT: [email or web address]      Name:     JARRELL LYNCH   MRN:      [id number]        Account:      [id number]   :      1985           Service Date: 10/05/2017      Document: [id number]

## 2017-10-05 NOTE — PATIENT INSTRUCTIONS
You can reduce your gabapentin dose by 1 pill every week, until you find the minimal dose that works for your symptoms. Of course, if symptoms worsen, you can always get back to 2 capsules three times a day.  I will refill your gabapentin until March next year. I would kindly ask your oncologist or primary care doctor to refill it from there on.  Please do not hesitate to call with any worsening symptoms. [phone]. Thank you

## 2017-10-05 NOTE — MR AVS SNAPSHOT
After Visit Summary   10/5/2017    Erika Ziegler    MRN: [id number]           Patient Information     Date Of Birth          1985        Visit Information        Provider Department      10/5/2017 8:00 AM Pancho Villareal MD [facility] Neurology        Today's Diagnoses     Chemotherapy-induced neuropathy (H)    -  1    MALIG NEOPLASM EXOCERVIX          Care Instructions    You can reduce your gabapentin dose by 1 pill every week, until you find the minimal dose that works for your symptoms. Of course, if symptoms worsen, you can always get back to 2 capsules three times a day.  I will refill your gabapentin until March next year. I would kindly ask your oncologist or primary care doctor to refill it from there on.  Please do not hesitate to call with any worsening symptoms. [phone]. Thank you              Follow-ups after your visit        Your next 10 appointments already scheduled     Nov 30, 2017  8:30 AM CST   (Arrive by 8:15 AM)   Return Active Treatment with AMOL Tomlinson CNP   [facility]    [address] [place] [id number] [phone]              Who to contact     Please call your clinic at [phone] to:    Ask questions about your health    Make or cancel appointments    Discuss your medicines    Learn about your test results    Speak to your doctor   If you have compliments or concerns about an experience at your clinic, or if you wish to file a complaint, please contact [facility] Patient Relations at [phone] or email us at [email or web address]         Additional Information About Your Visit        MyChart Information     [employer] gives you secure access to your electronic health record. If you see a primary care provider, you can also send messages to your care team and make appointments. If you have questions, please  "call your primary care clinic.  If you do not have a primary care provider, please call [phone] and they will assist you.      [employer] is an electronic gateway that provides easy, online access to your medical records. With [employer], you can request a clinic appointment, read your test results, renew a prescription or communicate with your care team.     To access your existing account, please contact your [facility] or call [phone] for assistance.        Care EveryWhere ID     This is your Care EveryWhere ID. This could be used by other organizations to access your [facility] medical records  [id number]        Your Vitals Were     Pulse Height BMI (Body Mass Index)             81 1.753 m (5' 9\") 22.74 kg/m2          Blood Pressure from Last 3 Encounters:   10/05/17 129/80   05/18/17 123/82   12/06/16 130/88    Weight from Last 3 Encounters:   10/05/17 69.9 kg (154 lb)   05/18/17 69.9 kg (154 lb 1.6 oz)   12/06/16 69.7 kg (153 lb 9.6 oz)              Today, you had the following     No orders found for display         Today's Medication Changes          These changes are accurate as of: 10/5/17  8:19 AM.  If you have any questions, ask your nurse or doctor.               These medicines have changed or have updated prescriptions.        Dose/Directions    gabapentin 300 MG capsule   Commonly known as:  NEURONTIN   This may have changed:  when to take this   Used for:  Chemotherapy-induced neuropathy (H)   Changed by:  Pancho Villareal MD        Dose:  600 mg   Take 2 capsules (600 mg) by mouth 3 times daily   Quantity:  540 capsule   Refills:  1            Where to get your medicines      [facility] [place] [facility] [place], [place] - [address] [facility]  [address] [facility] [address]     Phone:  [phone]     gabapentin 300 MG capsule                Primary Care Provider Office Phone # Fax #    Saritha Rodney MD " [phone]       [address]  [facility] [address]        Equal Access to Services     BERNADETTE MAYER : Pal sergio yap ignacio Burgos, waestebanda luarminda, jamesonta kanaheedda bentley, malu triana laAnujjun cuevas. So [facility] [phone].    ATENCIÓN: Si habla español, tiene a woodward disposición servicios gratuitos de asistencia lingüística. Llame al [phone].    We comply with applicable federal civil rights laws and [place] laws. We do not discriminate on the basis of race, color, national origin, age, disability, sex, sexual orientation, or gender identity.            Thank you!     Thank you for choosing [facility] NEUROLOGY  for your care. Our goal is always to provide you with excellent care. Hearing back from our patients is one way we can continue to improve our services. Please take a few minutes to complete the written survey that you may receive in the mail after your visit with us. Thank you!             Your Updated Medication List - Protect others around you: Learn how to safely use, store and throw away your medicines at www.disposemymeds.org.          This list is accurate as of: 10/5/17  8:19 AM.  Always use your most recent med list.                   Brand Name Dispense Instructions for use Diagnosis    ALPRAZolam 0.25 MG tablet    XANAX    30 tablet    Take 1 tablet (0.25 mg) by mouth 3 times daily as needed for anxiety    Anxiety       gabapentin 300 MG capsule    NEURONTIN    540 capsule    Take 2 capsules (600 mg) by mouth 3 times daily    Chemotherapy-induced neuropathy (H)       iohexol 140 MG/ML Soln solution    OMNIPAQUE    50 mL    Mix entire bottle (50ml) of contast with 600ml (20 ounces) of water and drink half 2 hrs prior to CT scan and half 1 hr prior to scan    Cervical cancer (H)       L-Glutamine 500 MG Tabs      Take 1,000 mg by mouth 2 times daily        norgestrel-ethinyl estradiol 0.3-30 MG-MCG per tablet    LO/OVRAL    28 tablet    Take 1 tablet by  mouth daily    Malignant neoplasm of cervix, unspecified site (H)       pyridoxine 100 MG tablet    VITAMIN B-6     Take 100 mg by mouth 2 times daily        VITAMIN D PO      Take 1,000 mg by mouth daily

## 2017-11-17 ENCOUNTER — MYC MEDICAL ADVICE (OUTPATIENT)
Dept: ONCOLOGY | Facility: CLINIC | Age: 32
End: 2017-11-17

## 2017-11-17 NOTE — TELEPHONE ENCOUNTER
Contacted Erika regarding her questions and encouraged her to contact [facility] to see if she can be seen there regarding high risk pregnancy/infertility. If that does not work, have encouraged her to contact [facility]. Answered all of her questions to the best of my ability.  She voiced appreciation and understanding of the above.

## 2018-02-09 ASSESSMENT — ENCOUNTER SYMPTOMS
TINGLING: 1
NUMBNESS: 1

## 2018-02-19 ASSESSMENT — ENCOUNTER SYMPTOMS
HOARSE VOICE: 0
EYE IRRITATION: 0
HEARTBURN: 0
CHILLS: 0
NAIL CHANGES: 0
WEIGHT GAIN: 0
SINUS PAIN: 0
ABDOMINAL PAIN: 0
VOMITING: 0
RECTAL BLEEDING: 0
HYPERTENSION: 0
BREAST PAIN: 0
EYE WATERING: 0
INSOMNIA: 0
POLYPHAGIA: 0
HOT FLASHES: 0
HYPOTENSION: 0
LEG PAIN: 0
SHORTNESS OF BREATH: 0
WEIGHT LOSS: 0
NUMBNESS: 1
FEVER: 0
POOR WOUND HEALING: 0
EXERCISE INTOLERANCE: 0
SPUTUM PRODUCTION: 0
TASTE DISTURBANCE: 0
TROUBLE SWALLOWING: 0
NERVOUS/ANXIOUS: 0
BRUISES/BLEEDS EASILY: 0
RESPIRATORY PAIN: 0
SMELL DISTURBANCE: 0
ARTHRALGIAS: 0
EYE PAIN: 0
PANIC: 0
MUSCLE WEAKNESS: 0
POLYDIPSIA: 0
FLANK PAIN: 0
RECTAL PAIN: 0
MYALGIAS: 0
BLOATING: 0
DIARRHEA: 0
BACK PAIN: 0
HEMATURIA: 0
SNORES LOUDLY: 0
DIFFICULTY URINATING: 0
BOWEL INCONTINENCE: 0
NIGHT SWEATS: 0
HEMOPTYSIS: 0
SYNCOPE: 0
NECK PAIN: 0
LEG SWELLING: 0
DYSURIA: 0
DEPRESSION: 0
JOINT SWELLING: 0
FATIGUE: 0
BREAST MASS: 0
JAUNDICE: 0
DOUBLE VISION: 0
SLEEP DISTURBANCES DUE TO BREATHING: 0
SWOLLEN GLANDS: 0
NAUSEA: 0
DYSPNEA ON EXERTION: 0
ORTHOPNEA: 0
TINGLING: 1
TACHYCARDIA: 0
ALTERED TEMPERATURE REGULATION: 0
EYE REDNESS: 0
COUGH: 0
CLAUDICATION: 0
DECREASED LIBIDO: 0
NECK MASS: 0
POSTURAL DYSPNEA: 0
STIFFNESS: 0
SINUS CONGESTION: 0
INCREASED ENERGY: 0
CONSTIPATION: 0
WHEEZING: 0
LIGHT-HEADEDNESS: 0
SORE THROAT: 0
SKIN CHANGES: 0
BLOOD IN STOOL: 0
DECREASED APPETITE: 0
PALPITATIONS: 0
HALLUCINATIONS: 0
MUSCLE CRAMPS: 0
DECREASED CONCENTRATION: 0
COUGH DISTURBING SLEEP: 0

## 2018-02-19 NOTE — PROGRESS NOTES
Follow Up Notes on Referred Patient    Date: 2018       Dr. Saritha Rodney, MD  [address] [address]       RE: Erika Ziegler  : 1985  HUM[place]ERT[place]: 2018    Dear Dr. 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 She is here today for a surveillance visit.       Course to date:   Erika had some post coital bleeding and was seen by Dr. Silva for her annual visit. On pelvic examination she was noted to have a friable cervical mass for which a pap smear was obtained. She then underwent a colposcopy with biopsies taken with above diagnosis made. She would like to have children in the future and the current plan is to preserve her ovaries and undergo oocyte and/embryo preservation with surrogate carrier.   Pap/colpo history:   5/10/4: NIL   05: LSIL   3/2006 colpo with mild dysplasia   06: LSIL pap   07: ASCUS +HPV   07: LSIL   2008: colpo with mild dysplasia   08 & 2009: LSIL   2009: ANDRZEJ I-II   3/2010: colpo ANDRZEJ I   11/5/10, 11, 12: NIL   13: ASC-H, JERRI   2013: colpo with poorly differentiated carcinoma with neuroendocrine differentiation and partial tumor necrosis   13: PET CT with 6.5 x 5.2 cm cervical mass; tiny focus of increased metabolism in the right midpelvis laterally. The ureter at this level is difficult to follow. This may represent some activity in the distal ureter through it is difficult to completely exclude activity in a small non enlarged pelvic lymph node. Chest lear, no other evidence of mets.   13: Met with reproductive endocrinology to discuss fertility options. Per their recommendation, given the cervical cancer diagnosis and the size of the lesion, the patient is not a  "suitable candidate for transvaginal oocyte retrieval. Transabdominal ultrasound was performed in addition to transvaginal ultrasound, to evaluate for possible transabdominal oocyte retrieval, with ovaries positioned low in the pelvis precluding safe transabdominal oocyte retrieval. Discussed option of ovarian tissue cryopreservation and ovarian translocation prior to radiation therapy.   5/1/13: MR PELVIS IMPRESSION:  1. 5.1 x 3.1 x 5.9 cm enhancing cervical mass extending of the posterior aspect of the cervix not involving the vagina or uterus no evidence of parametrial spread.  2. Two small nonspecific pelvic lymph nodes, 1.0 x 0.6 cm left pelvic lymph node series 6 image 6, 0.6 x 1.0 cm right pelvic lymph node on image 6.  5/13/13: laparoscopy, left ovarian transposition, right salpingo oophorectomy (reproductive medicine taking right ovary after surgery)   5/20/13-6/17/13: Cycle #1-3 Etoposide/Cisplatin; began EBRT   6/24/13: Insertion of High Dose Rate Tandem and Ring for Iridium-192 implant. Pt tolerated well.   7/9/13: Completed brachytherapy  8/2/13-8/21/13: Cycle #1-2 Taxol/Carbo  9/11/13 PET/CT IMPRESSION:  1. No evidence of FDG avid malignancy in the neck, chest, abdomen, or pelvis.  2. Inflammatory changes in the presacral and perirectal spaces, likely post radiation change.  9/13/13-10/4/13: Cycle #3-4 Taxol/Carbo  12/16/13: PET/CT IMPRESSION:  1. No evidence of hypermetabolic activity within the neck, chest, abdomen, or pelvis to suggest active or metastatic disease.  2. Persistent inflammatory changes within the low pelvis, most compatible with posttreatment change, without associated hypermetabolic activity to suggest local recurrence.  12/18/13: recovering relatively well from treatment. She still has neuropathy in her feet. It is constant, annoying tingling and numbness in her toes. She has some relief with gabapentin and B6/L-glutamine. She also still has some \"digestive upset\" since finishing " radiation therapy, has diarrhea especially in the morning. She also had some chest discomfort last week, but this has since resolved and she attributes it to anxiety after meeting a patient with similar cancer to hers that has metastasized to lungs. She also continues to have some bleeding and discomfort with use of vaginal dilator. She still takes OCPs and has NOT been skipping sugar pill week. She does not feel she experiences any menopausal symptoms on these off weeks but has also not been monitoring it closely. Amenorrhea still.   3/17/14: CT C/A/P IMPRESSION: No CT scan findings in the chest, abdomen, or pelvis to indicate recurrent or metastatic tumor. Unchanged mild free fluid in the pelvis.  3/19/14 pap NIL  6/10/14: CT C/A/P Impression:   1. No evidence of recurrent or metastatic cervical cancer in the chest, abdomen, or pelvis.   2. New subtle wall thickening of the small bowel loops in the low abdomen, likely sequelae of prior radiation.   3. Stable nonspecific pulmonary nodules measuring up to 3 mm since at least 9/11/2013. Continued followup recommended until 12 month stability is documented. No new pulmonary nodule.   6/11/14: Pap NIL.  9/2/14: Pap NIL, HPV negative. CT cap showed: No evidence of recurrence or metastatic lesion in chest, abdomen, or pelvis. Stable sub-4 mm pulmonary nodules.  12/2/14: Pap LSIL, HPV negative. CT cap showed: Impression:   1. No evidence of recurrent local or metastatic disease in the chest, abdomen, or pelvis.  2. Stable sub-4 mm pulmonary nodule on the left. No new pulmonary nodules.  2/17/15: CT C/A/P: IMPRESSION:   1. Left lower lobe 2 mm nodule unchanged since initial imaging on 9/11/2013.  2. No evidence of recurrent local or metastatic disease in the chest, abdomen, or pelvis.  3/5/15: Pap ASC-H, LSIL also present, HPV 18 postive.    4/4/15: ED visit for 4 days of abdominal pain, increasing in severity with increased frequency of urination and bowel movements over  the past 2 days as well. She also complains of abdominal distention. Gyn onc consult in hospital did not recommend CT at that time unless symptoms worsen. Discharged same day.   XRay abdomen: Nonobstructive bowel gas pattern. No free intraperitoneal air.      4/16/15: Colpo done. No aceto-white changes identified. No biopsies done. Plan to repeat pap in June as well as CT.       6/9/15: CT cap verbal preliminary report by Dr. Eric is no change in pulmonary nodules and no evidence of recurrence/metastatic disease. Pap pending.       Addendum: CT cap IMPRESSION:   1. Indeterminate 12 mm hypodensity within the uterine fundus may represent fluid within the endometrial canal secondary to cervical stenosis. Underlying uterine lesion not excluded. Initially, a dedicated pelvic ultrasound is recommended for further assessment.  2. No evidence of metastatic disease.  3. 2 mm nodules in the left lung are unchanged since at least 9/11/2013, and are statistically benign.       Plan for U/S for further evaluation of uterus.      7/2/15: U/S results pending. Verbal report per Dr. Guajardo shows a solid fibroid like area which is not fluid; recommend f/u with additional imaging.       U/S final IMPRESSION:   1. Relatively well-defined small mixed echogenicity myometrial mass in the uterine fundus. Fibroid could have this appearance, however given patient's history of cervical malignancy and radiation, consider a 3-6 month followup to ensure stability.  2. Ovaries are not identified.  3. Trace free fluid in the pelvis.      9/1/15: CT cap IMPRESSION:    1. No evidence of metastatic disease in the chest, abdomen, or pelvis.  2. Stable uterine fundus hypodensity most consistent with submucosal fibroid as described on pelvic ultrasound 7/2/2015. However, given the patient's history of cervical cancer, short-term followup ultrasound in 3-6 months is recommended.  3. Postsurgical changes of right salpingo-oophorectomy and  left  Salpingectomy.      9/1/15: pap NIL, neg HPV.       12/4/15: pelvic ultrasound FINDINGS:  The uterus measures 5.3 x 3.4 x 2.0 cm. The endometrium is within normal limits and measures 2.0 mm. There is no free fluid in the pelvis. Redemonstration of a heterogeneous appearing circumscribed submucosal mass in the uterine fundus measuring 10 x 8 x 6 mm, previously 8 x 13 x 9 mm. Unchanged calcification in the lower uterine segment. No new masses are identified. The right ovary is surgically absent. The left ovary was not visualized. No adnexal masses.  No focal abnormality of the visualized portions of the bladder.  IMPRESSION:    Mild decrease in the submucosal mass in the uterine fundus from 7/2/2015, which most likely represents a benign fibroid. No other suspicious masses are identified.      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       6//2016: Pap NIL. 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      12/6/16: CT cap IMPRESSION:   1. In this patient with history of cervical cancer there is no evidence of recurrent or metastatic disease in the chest, abdomen and pelvis.   2. Tiny pulmonary nodules are unchanged since 9/11/2013.     5/18/17: Pap NIL.  8/14/17: CT cap IMPRESSION: No evidence of recurrent or metastatic disease in the chest, abdomen and pelvis  2/20/18: CT cap IMPRESSION: No evidence of recurrent or metastatic disease in the chest, abdomen or pelvis.          Today she comes to clinic and denies any concerning symptoms. She denies any vaginal bleeding, no changes in her bowel or bladder habits, no nausea/emesis, no lower extremity edema, and no difficulties eating or  sleeping. She denies any abdominal discomfort/bloating, no fevers or chills, and no chest pain or shortness of breath. She has decreased to Gabapentin to 300 mg BID and states her symptoms are managed on this. She denies any issues with IC. She states she has used only about 3 of the Xanax she filled last May. She has paperwork regarding their second adoption which she would like filled out.            Review of Systems     Constitutional:  Negative for fever, chills, weight loss, weight gain, fatigue, decreased appetite, night sweats, recent stressors, height gain, height loss, post-operative complications, incisional pain, hallucinations, increased energy, hyperactivity and confused.   HENT:  Negative for ear pain, hearing loss, tinnitus, nosebleeds, trouble swallowing, hoarse voice, mouth sores, sore throat, ear discharge, tooth pain, gum tenderness, taste disturbance, smell disturbance, hearing aid, bleeding gums, dry mouth, sinus pain, sinus congestion and neck mass.    Eyes:  Negative for double vision, pain, redness, eye pain, decreased vision, eye watering, eye bulging, eye dryness, flashing lights, spots, floaters, strabismus, tunnel vision, jaundice and eye irritation.   Respiratory:   Negative for cough, hemoptysis, sputum production, shortness of breath, wheezing, sleep disturbances due to breathing, snores loudly, respiratory pain, dyspnea on exertion, cough disturbing sleep and postural dyspnea.    Cardiovascular:  Negative for chest pain, dyspnea on exertion, palpitations, orthopnea, claudication, leg swelling, fingers/toes turn blue, hypertension, hypotension, syncope, history of heart murmur, chest pain on exertion, chest pain at rest, pacemaker, few scattered varicosities, leg pain, sleep disturbances due to breathing, tachycardia, light-headedness, exercise intolerance and edema.   Gastrointestinal:  Negative for heartburn, nausea, vomiting, abdominal pain, diarrhea, constipation, blood in stool,  melena, rectal pain, bloating, hemorrhoids, bowel incontinence, jaundice, rectal bleeding, coffee ground emesis and change in stool.   Genitourinary:  Negative for bladder incontinence, dysuria, urgency, hematuria, flank pain, vaginal discharge, difficulty urinating, genital sores, dyspareunia, decreased libido, nocturia, voiding less frequently, arousal difficulty, abnormal vaginal bleeding, excessive menstruation, menstrual changes, hot flashes, vaginal dryness and postmenopausal bleeding.   Musculoskeletal:  Negative for myalgias, back pain, joint swelling, arthralgias, stiffness, muscle cramps, neck pain, bone pain, muscle weakness and fracture.   Skin:  Negative for nail changes, itching, poor wound healing, rash, hair changes, skin changes, acne, warts, poor wound healing, scarring, flaky skin, Raynaud's phenomenon, sensitivity to sunlight and skin thickening.   Neurological:  Positive for tingling and numbness. Negative for light-headedness.   Endo/Heme:  Negative for anemia, swollen glands and bruises/bleeds easily.   Psychiatric/Behavioral:  Negative for depression, hallucinations, decreased concentration, mood swings and panic attacks.    Breast:  Negative for breast discharge, breast mass, breast pain and nipple retraction.   Endocrine:  Negative for altered temperature regulation, polyphagia, polydipsia, unwanted hair growth and change in facial hair.          Past Medical History:    Past Medical History:   Diagnosis Date     Cervix cancer (H) 4/2013    neuroendocrine         Past Surgical History:    Past Surgical History:   Procedure Laterality Date     BIOPSY       EXAM UNDER ANESTHESIA, INSERT JOESPH SLEEVE, UTERINE PLACEMENT OF TANDEM AND RING FOR RAD, ULTRASOUND  6/24/2013    Procedure: EXAM UNDER ANESTHESIA, INSERT JOESPH SLEEVE, UTERINE PLACEMENT OF TANDEM AND RING FOR RADIATION, ULTRASOUND GUIDED;  Pelvic Exam, Insert JOESPH Sleeve with Ultrasound Guidance and Place Tandem Ring for High Dose  "Radiation;  Surgeon: Roxy Brar MD;  Location: [facility] OR     LAPAROSCOPIC SALPINGO-OOPHORECTOMY  5/13/2013    Procedure: LAPAROSCOPIC SALPINGO-OOPHORECTOMY;  Laparoscopy, Left  salpingectomy,Ovarian Transposition, Right Salpingo Oophorectomy , Anesthesia General with block;  Surgeon: Deanna Salinas MD;  Location: [facility] OR     wisdom teeth           Health Maintenance Due   Topic Date Due     TETANUS IMMUNIZATION (SYSTEM ASSIGNED)  10/25/2003     INFLUENZA VACCINE (SYSTEM ASSIGNED)  09/01/2017       Current Medications:     Current Outpatient Prescriptions   Medication Sig Dispense Refill     gabapentin (NEURONTIN) 300 MG capsule Take 2 capsules (600 mg) by mouth 3 times daily 540 capsule 1     norgestrel-ethinyl estradiol (LO/OVRAL) 0.3-30 MG-MCG per tablet Take 1 tablet by mouth daily 28 tablet 11     ALPRAZolam (XANAX) 0.25 MG tablet Take 1 tablet (0.25 mg) by mouth 3 times daily as needed for anxiety 30 tablet 0         Allergies:      No Known Allergies     Social History:     Social History   Substance Use Topics     Smoking status: Never Smoker     Smokeless tobacco: Never Used     Alcohol use 0.0 oz/week     0 Standard drinks or equivalent per week      Comment: 3 per week       History   Drug Use No         Family History:       Family History   Problem Relation Age of Onset     CANCER Maternal Grandfather      leukemia     Breast Cancer No family hx of      Cancer - colorectal No family hx of          Physical Exam:     /83  Pulse 86  Temp 98.2  F (36.8  C) (Oral)  Resp 18  Ht 1.753 m (5' 9\")  Wt 70.8 kg (156 lb 1.6 oz)  SpO2 96%  Breastfeeding? No  BMI 23.05 kg/m2  Body mass index is 23.05 kg/(m^2).    General Appearance: healthy and alert, no distress     HEENT: no thyromegaly, no palpable nodules or masses        Cardiovascular: regular rate and rhythm, no gallops, rubs or murmurs     Respiratory: lungs clear, no rales, rhonchi or wheezes, normal diaphragmatic " excursion    Musculoskeletal: extremities non tender and without edema    Skin: no lesions or rashes     Neurological: normal gait, no gross defects     Psychiatric: appropriate mood and affect                               Hematological: normal cervical, supraclavicular and inguinal lymph nodes     Gastrointestinal:       abdomen soft, non-tender, non-distended, no organomegaly or masses    Genitourinary: Patient declined due to recent imaging      Assessment:    Erika Ziegler is a 32 year old woman with a history of 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 She is here today for a surveillance visit.    20 minutes were spent with this patient, over 50% of that time was spent in symptom management, treatment planning and in counseling and coordination of care.      Plan:     1.)        Reviewed recent imaging and results released to [place]. Patient to RTC in 6 months for her next surveillance visit; she will be due for her annual pap at that visit. She will be at her 5 year post treatment point at her next visit and she can then extend her surveillance to annually; at this time will plan on annual imaging but discussed that this may change to imaging only with symptoms. Will plan on a CT at her next visit. Reviewed signs and symptoms for when she should contact the clinic or seek additional care. Patient to contact the clinic with any questions or concerns in the interim. Filled out paperwork for her adoption and gave it to her.     2.) Genetic risk factors were assessed and the patient does not meet the qualifications for a referral.      3.) Labs and/or tests ordered include:  Flu shot. CT cap.     4.) Health maintenance issues addressed today include annual health maintenance and non-gynecologic issues with PCP.    AMOL Whittaker, WHNP-BC, ANP-BC  Women's Health  Nurse Practitioner  Adult Nurse Pracitioner  Division of Gynecologic Oncology          CC  Patient Care Team:  Tanvi Cullen MD as PCP - General (Family Practice)  Shima Babcock MD as MD (Neurology)  Shima Babcock MD as Referring Physician (Neurology)  Pancho Villareal MD as MD (Neurology)  TANVI CULLEN

## 2018-02-20 ENCOUNTER — RADIANT APPOINTMENT (OUTPATIENT)
Dept: CT IMAGING | Facility: CLINIC | Age: 33
End: 2018-02-20
Attending: NURSE PRACTITIONER
Payer: COMMERCIAL

## 2018-02-20 ENCOUNTER — ONCOLOGY VISIT (OUTPATIENT)
Dept: ONCOLOGY | Facility: CLINIC | Age: 33
End: 2018-02-20
Attending: NURSE PRACTITIONER
Payer: COMMERCIAL

## 2018-02-20 VITALS
TEMPERATURE: 98.2 F | RESPIRATION RATE: 18 BRPM | HEART RATE: 86 BPM | DIASTOLIC BLOOD PRESSURE: 83 MMHG | OXYGEN SATURATION: 96 % | HEIGHT: 69 IN | WEIGHT: 156.1 LBS | BODY MASS INDEX: 23.12 KG/M2 | SYSTOLIC BLOOD PRESSURE: 135 MMHG

## 2018-02-20 DIAGNOSIS — C53.9 MALIGNANT NEOPLASM OF CERVIX, UNSPECIFIED SITE (H): ICD-10-CM

## 2018-02-20 DIAGNOSIS — C53.1 MALIGNANT NEOPLASM OF EXOCERVIX (H): Primary | ICD-10-CM

## 2018-02-20 PROCEDURE — 25000128 H RX IP 250 OP 636: Mod: ZF | Performed by: INTERNAL MEDICINE

## 2018-02-20 PROCEDURE — G0008 ADMIN INFLUENZA VIRUS VAC: HCPCS

## 2018-02-20 PROCEDURE — 96372 THER/PROPH/DIAG INJ SC/IM: CPT | Mod: ZF

## 2018-02-20 PROCEDURE — 90686 IIV4 VACC NO PRSV 0.5 ML IM: CPT | Mod: ZF | Performed by: INTERNAL MEDICINE

## 2018-02-20 PROCEDURE — 99213 OFFICE O/P EST LOW 20 MIN: CPT | Mod: ZP | Performed by: NURSE PRACTITIONER

## 2018-02-20 PROCEDURE — G0463 HOSPITAL OUTPT CLINIC VISIT: HCPCS | Mod: 25,ZF

## 2018-02-20 PROCEDURE — G0463 HOSPITAL OUTPT CLINIC VISIT: HCPCS | Mod: 25

## 2018-02-20 RX ORDER — IOPAMIDOL 755 MG/ML
95 INJECTION, SOLUTION INTRAVASCULAR ONCE
Status: COMPLETED | OUTPATIENT
Start: 2018-02-20 | End: 2018-02-20

## 2018-02-20 RX ADMIN — INFLUENZA A VIRUS A/MICHIGAN/45/2015 X-275 (H1N1) ANTIGEN (FORMALDEHYDE INACTIVATED), INFLUENZA A VIRUS A/HONG KONG/4801/2014 X-263B (H3N2) ANTIGEN (FORMALDEHYDE INACTIVATED), INFLUENZA B VIRUS B/PHUKET/3073/2013 ANTIGEN (FORMALDEHYDE INACTIVATED), AND INFLUENZA B VIRUS B/BRISBANE/60/2008 ANTIGEN (FORMALDEHYDE INACTIVATED) 0.5 ML: 15; 15; 15; 15 INJECTION, SUSPENSION INTRAMUSCULAR at 15:40

## 2018-02-20 RX ADMIN — IOPAMIDOL 95 ML: 755 INJECTION, SOLUTION INTRAVASCULAR at 09:40

## 2018-02-20 ASSESSMENT — PAIN SCALES - GENERAL: PAINLEVEL: NO PAIN (0)

## 2018-02-20 NOTE — DISCHARGE INSTRUCTIONS
CT Contrast Discharge Instructions    The IV contrast you received today will pass out of your body in your  urine. This will happen in the next 24 hours. You will not feel this process.  Your urine will not change color.    Drink at least 4 extra glasses of water or juice today (unless your doctor  has restricted your fluids). This reduces the stress on your kidneys.  You may take your regular medicines.    If you are on dialysis: It is best to have dialysis today.    If you have a reaction: Most reactions happen right away. If you have  any new symptoms after leaving the hospital (such as hives or swelling),  call your hospital at the correct number below. Or call your family doctor.  If you have breathing distress or wheezing, call 911.    Special instructions: ***    I have read and understand the above information.    Signature:______________________________________ Date:___________    Staff:__________________________________________ Date:___________     Time:__________    [facility] Departments:    ___[facility]: [phone]  ___[facility]: [phone]  ___[facility]: [phone] ___[facility]: [phone]  ___[facility]: [phone]  ___[facility]: [phone]  ___[phone]  ___[facility]: [phone]  ___[phone]

## 2018-02-20 NOTE — NURSING NOTE
Erika Ziegler      1.  Has the patient received the information for the influenza vaccine? YES    2.  Does the patient have any of the following contraindications?     Allergy to eggs? No     Allergic reaction to previous influenza vaccines? No     Any other problems to previous influenza vaccines? No     Paralyzed by Guillain-Waits[place] syndrome? No     Currently pregnant? NO     Current moderate or severe illness? No     Allergy to contact lens solution? No    3.  The vaccine has been administered in the usual fashion and the patient was instructed to wait 20 minutes before leaving the building in the event of an allergic reaction: YES    Vaccination given by Brett Thomsa CMA .  Recorded by Brett Thomas

## 2018-02-20 NOTE — NURSING NOTE
"Oncology Rooming Note    February 20, 2018 2:58 PM   Erika Ziegler is a 32 year old female who presents for:    Chief Complaint   Patient presents with     Oncology Clinic Visit     return patient visit for pap smear/ct results related to cervical ca (H)     Initial Vitals: /83  Pulse 86  Temp 98.2  F (36.8  C) (Oral)  Resp 18  Ht 1.753 m (5' 9\")  Wt 70.8 kg (156 lb 1.6 oz)  SpO2 96%  BMI 23.05 kg/m2 Estimated body mass index is 23.05 kg/(m^2) as calculated from the following:    Height as of this encounter: 1.753 m (5' 9\").    Weight as of this encounter: 70.8 kg (156 lb 1.6 oz). Body surface area is 1.86 meters squared.  No Pain (0) Comment: Data Unavailable   No LMP recorded. Patient is not currently having periods (Reason: UNKNOWN).  Allergies reviewed: Yes  Medications reviewed: Yes    Medications: Medication refills not needed today.  Pharmacy name entered into [facility]:    [id number] - [place], [place] - [address] [facility] - [place], [place] - [address] [facility] [place], [place] - [address] [facility] [phone]  [place], [place] - [place], [place] - [address] [place] [address]    Clinical concerns: no concerns clemencia was notified.    5 minutes for nursing intake (face to face time)     Brett Thomas CMA              "

## 2018-02-20 NOTE — LETTER
2018       RE: Erika Ziegler  [address]  [facility] [address]     Dear Colleague,    Thank you for referring your patient, Erika Ziegler, to the [facility]. Please see a copy of my visit note below.                Follow Up Notes on Referred Patient    Date: 2018       Dr. Saritha Rodney MD  [address]  [place] [address]       RE: Erika Ziegler  : 1985  HUMBERTO: 2018    Dear Dr. 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 She is here today for a surveillance visit.       Course to date:   Erika had some post coital bleeding and was seen by Dr. Silva for her annual visit. On pelvic examination she was noted to have a friable cervical mass for which a pap smear was obtained. She then underwent a colposcopy with biopsies taken with above diagnosis made. She would like to have children in the future and the current plan is to preserve her ovaries and undergo oocyte and/embryo preservation with surrogate carrier.   Pap/colpo history:   5/10/4: NIL   05: LSIL   3/2006 colpo with mild dysplasia   06: LSIL pap   07: ASCUS +HPV   07: LSIL   2008: colpo with mild dysplasia   08 & 2009: LSIL   2009: ANDRZEJ I-II   3/2010: colpo ANDRZEJ I   11/5/10, 11, 12: NIL   13: ASC-H, JERRI   2013: colpo with poorly differentiated carcinoma with neuroendocrine differentiation and partial tumor necrosis   13: PET CT with 6.5 x 5.2 cm cervical mass; tiny focus of increased metabolism in the right midpelvis laterally. The ureter at this level is difficult to follow. This may represent some activity in the distal ureter through it is difficult to completely exclude activity in a small non enlarged  pelvic lymph node. Chest lear, no other evidence of mets.   5/1/13: Met with reproductive endocrinology to discuss fertility options. Per their recommendation, given the cervical cancer diagnosis and the size of the lesion, the patient is not a suitable candidate for transvaginal oocyte retrieval. Transabdominal ultrasound was performed in addition to transvaginal ultrasound, to evaluate for possible transabdominal oocyte retrieval, with ovaries positioned low in the pelvis precluding safe transabdominal oocyte retrieval. Discussed option of ovarian tissue cryopreservation and ovarian translocation prior to radiation therapy.   5/1/13: MR PELVIS IMPRESSION:  1. 5.1 x 3.1 x 5.9 cm enhancing cervical mass extending of the posterior aspect of the cervix not involving the vagina or uterus no evidence of parametrial spread.  2. Two small nonspecific pelvic lymph nodes, 1.0 x 0.6 cm left pelvic lymph node series 6 image 6, 0.6 x 1.0 cm right pelvic lymph node on image 6.  5/13/13: laparoscopy, left ovarian transposition, right salpingo oophorectomy (reproductive medicine taking right ovary after surgery)   5/20/13-6/17/13: Cycle #1-3 Etoposide/Cisplatin; began EBRT   6/24/13: Insertion of High Dose Rate Tandem and Ring for Iridium-192 implant. Pt tolerated well.   7/9/13: Completed brachytherapy  8/2/13-8/21/13: Cycle #1-2 Taxol/Carbo  9/11/13 PET/CT IMPRESSION:  1. No evidence of FDG avid malignancy in the neck, chest, abdomen, or pelvis.  2. Inflammatory changes in the presacral and perirectal spaces, likely post radiation change.  9/13/13-10/4/13: Cycle #3-4 Taxol/Carbo  12/16/13: PET/CT IMPRESSION:  1. No evidence of hypermetabolic activity within the neck, chest, abdomen, or pelvis to suggest active or metastatic disease.  2. Persistent inflammatory changes within the low pelvis, most compatible with posttreatment change, without associated hypermetabolic activity to suggest local recurrence.  12/18/13: recovering  "relatively well from treatment. She still has neuropathy in her feet. It is constant, annoying tingling and numbness in her toes. She has some relief with gabapentin and B6/L-glutamine. She also still has some \"digestive upset\" since finishing radiation therapy, has diarrhea especially in the morning. She also had some chest discomfort last week, but this has since resolved and she attributes it to anxiety after meeting a patient with similar cancer to hers that has metastasized to lungs. She also continues to have some bleeding and discomfort with use of vaginal dilator. She still takes OCPs and has NOT been skipping sugar pill week. She does not feel she experiences any menopausal symptoms on these off weeks but has also not been monitoring it closely. Amenorrhea still.   3/17/14: CT C/A/P IMPRESSION: No CT scan findings in the chest, abdomen, or pelvis to indicate recurrent or metastatic tumor. Unchanged mild free fluid in the pelvis.  3/19/14 pap NIL  6/10/14: CT C/A/P Impression:   1. No evidence of recurrent or metastatic cervical cancer in the chest, abdomen, or pelvis.   2. New subtle wall thickening of the small bowel loops in the low abdomen, likely sequelae of prior radiation.   3. Stable nonspecific pulmonary nodules measuring up to 3 mm since at least 9/11/2013. Continued followup recommended until 12 month stability is documented. No new pulmonary nodule.   6/11/14: Pap NIL.  9/2/14: Pap NIL, HPV negative. CT cap showed: No evidence of recurrence or metastatic lesion in chest, abdomen, or pelvis. Stable sub-4 mm pulmonary nodules.  12/2/14: Pap LSIL, HPV negative. CT cap showed: Impression:   1. No evidence of recurrent local or metastatic disease in the chest, abdomen, or pelvis.  2. Stable sub-4 mm pulmonary nodule on the left. No new pulmonary nodules.  2/17/15: CT C/A/P: IMPRESSION:   1. Left lower lobe 2 mm nodule unchanged since initial imaging on 9/11/2013.  2. No evidence of recurrent local or " metastatic disease in the chest, abdomen, or pelvis.  3/5/15: Pap ASC-H, LSIL also present, HPV 18 postive.    4/4/15: ED visit for 4 days of abdominal pain, increasing in severity with increased frequency of urination and bowel movements over the past 2 days as well. She also complains of abdominal distention. Gyn onc consult in hospital did not recommend CT at that time unless symptoms worsen. Discharged same day.   XRay abdomen: Nonobstructive bowel gas pattern. No free intraperitoneal air.      4/16/15: Colpo done. No aceto-white changes identified. No biopsies done. Plan to repeat pap in June as well as CT.       6/9/15: CT cap verbal preliminary report by Dr. Eric is no change in pulmonary nodules and no evidence of recurrence/metastatic disease. Pap pending.       Addendum: CT cap IMPRESSION:   1. Indeterminate 12 mm hypodensity within the uterine fundus may represent fluid within the endometrial canal secondary to cervical stenosis. Underlying uterine lesion not excluded. Initially, a dedicated pelvic ultrasound is recommended for further assessment.  2. No evidence of metastatic disease.  3. 2 mm nodules in the left lung are unchanged since at least 9/11/2013, and are statistically benign.       Plan for U/S for further evaluation of uterus.      7/2/15: U/S results pending. Verbal report per Dr. Guajardo shows a solid fibroid like area which is not fluid; recommend f/u with additional imaging.       U/S final IMPRESSION:   1. Relatively well-defined small mixed echogenicity myometrial mass in the uterine fundus. Fibroid could have this appearance, however given patient's history of cervical malignancy and radiation, consider a 3-6 month followup to ensure stability.  2. Ovaries are not identified.  3. Trace free fluid in the pelvis.      9/1/15: CT cap IMPRESSION:    1. No evidence of metastatic disease in the chest, abdomen, or pelvis.  2. Stable uterine fundus hypodensity most consistent with  submucosal fibroid as described on pelvic ultrasound 7/2/2015. However, given the patient's history of cervical cancer, short-term followup ultrasound in 3-6 months is recommended.  3. Postsurgical changes of right salpingo-oophorectomy and left  Salpingectomy.      9/1/15: pap NIL, neg HPV.       12/4/15: pelvic ultrasound FINDINGS:  The uterus measures 5.3 x 3.4 x 2.0 cm. The endometrium is within normal limits and measures 2.0 mm. There is no free fluid in the pelvis. Redemonstration of a heterogeneous appearing circumscribed submucosal mass in the uterine fundus measuring 10 x 8 x 6 mm, previously 8 x 13 x 9 mm. Unchanged calcification in the lower uterine segment. No new masses are identified. The right ovary is surgically absent. The left ovary was not visualized. No adnexal masses.  No focal abnormality of the visualized portions of the bladder.  IMPRESSION:    Mild decrease in the submucosal mass in the uterine fundus from 7/2/2015, which most likely represents a benign fibroid. No other suspicious masses are identified.      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       6//2016: Pap NIL. 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      12/6/16: CT cap IMPRESSION:   1. In this patient with history of cervical cancer there is no evidence of recurrent or metastatic disease in the chest, abdomen and pelvis.   2. Tiny pulmonary nodules are unchanged since 9/11/2013.     5/18/17: Pap NIL.  8/14/17: CT cap IMPRESSION: No evidence of recurrent or metastatic disease in the chest, abdomen and pelvis  2/20/18: CT cap IMPRESSION: No evidence of recurrent or metastatic disease in the chest,  abdomen or pelvis.          Today she comes to clinic and denies any concerning symptoms. She denies any vaginal bleeding, no changes in her bowel or bladder habits, no nausea/emesis, no lower extremity edema, and no difficulties eating or sleeping. She denies any abdominal discomfort/bloating, no fevers or chills, and no chest pain or shortness of breath. She has decreased to Gabapentin to 300 mg BID and states her symptoms are managed on this. She denies any issues with IC. She states she has used only about 3 of the Xanax she filled last May. She has paperwork regarding their second adoption which she would like filled out.            Review of Systems     Constitutional:  Negative for fever, chills, weight loss, weight gain, fatigue, decreased appetite, night sweats, recent stressors, height gain, height loss, post-operative complications, incisional pain, hallucinations, increased energy, hyperactivity and confused.   HENT:  Negative for ear pain, hearing loss, tinnitus, nosebleeds, trouble swallowing, hoarse voice, mouth sores, sore throat, ear discharge, tooth pain, gum tenderness, taste disturbance, smell disturbance, hearing aid, bleeding gums, dry mouth, sinus pain, sinus congestion and neck mass.    Eyes:  Negative for double vision, pain, redness, eye pain, decreased vision, eye watering, eye bulging, eye dryness, flashing lights, spots, floaters, strabismus, tunnel vision, jaundice and eye irritation.   Respiratory:   Negative for cough, hemoptysis, sputum production, shortness of breath, wheezing, sleep disturbances due to breathing, snores loudly, respiratory pain, dyspnea on exertion, cough disturbing sleep and postural dyspnea.    Cardiovascular:  Negative for chest pain, dyspnea on exertion, palpitations, orthopnea, claudication, leg swelling, fingers/toes turn blue, hypertension, hypotension, syncope, history of heart murmur, chest pain on exertion, chest pain at rest, pacemaker, few scattered  varicosities, leg pain, sleep disturbances due to breathing, tachycardia, light-headedness, exercise intolerance and edema.   Gastrointestinal:  Negative for heartburn, nausea, vomiting, abdominal pain, diarrhea, constipation, blood in stool, melena, rectal pain, bloating, hemorrhoids, bowel incontinence, jaundice, rectal bleeding, coffee ground emesis and change in stool.   Genitourinary:  Negative for bladder incontinence, dysuria, urgency, hematuria, flank pain, vaginal discharge, difficulty urinating, genital sores, dyspareunia, decreased libido, nocturia, voiding less frequently, arousal difficulty, abnormal vaginal bleeding, excessive menstruation, menstrual changes, hot flashes, vaginal dryness and postmenopausal bleeding.   Musculoskeletal:  Negative for myalgias, back pain, joint swelling, arthralgias, stiffness, muscle cramps, neck pain, bone pain, muscle weakness and fracture.   Skin:  Negative for nail changes, itching, poor wound healing, rash, hair changes, skin changes, acne, warts, poor wound healing, scarring, flaky skin, Raynaud's phenomenon, sensitivity to sunlight and skin thickening.   Neurological:  Positive for tingling and numbness. Negative for light-headedness.   Endo/Heme:  Negative for anemia, swollen glands and bruises/bleeds easily.   Psychiatric/Behavioral:  Negative for depression, hallucinations, decreased concentration, mood swings and panic attacks.    Breast:  Negative for breast discharge, breast mass, breast pain and nipple retraction.   Endocrine:  Negative for altered temperature regulation, polyphagia, polydipsia, unwanted hair growth and change in facial hair.          Past Medical History:    Past Medical History:   Diagnosis Date     Cervix cancer (H) 4/2013    neuroendocrine         Past Surgical History:    Past Surgical History:   Procedure Laterality Date     BIOPSY       EXAM UNDER ANESTHESIA, INSERT JOESPH SLEEVE, UTERINE PLACEMENT OF TANDEM AND RING FOR RAD,  "ULTRASOUND  6/24/2013    Procedure: EXAM UNDER ANESTHESIA, INSERT JOESPH SLEEVE, UTERINE PLACEMENT OF TANDEM AND RING FOR RADIATION, ULTRASOUND GUIDED;  Pelvic Exam, Insert JOESPH Sleeve with Ultrasound Guidance and Place Tandem Ring for High Dose Radiation;  Surgeon: Roxy Brar MD;  Location: [facility] OR     LAPAROSCOPIC SALPINGO-OOPHORECTOMY  5/13/2013    Procedure: LAPAROSCOPIC SALPINGO-OOPHORECTOMY;  Laparoscopy, Left  salpingectomy,Ovarian Transposition, Right Salpingo Oophorectomy , Anesthesia General with block;  Surgeon: Deanna Salinas MD;  Location: [facility] OR     wisdom teeth           Health Maintenance Due   Topic Date Due     TETANUS IMMUNIZATION (SYSTEM ASSIGNED)  10/25/2003     INFLUENZA VACCINE (SYSTEM ASSIGNED)  09/01/2017       Current Medications:     Current Outpatient Prescriptions   Medication Sig Dispense Refill     gabapentin (NEURONTIN) 300 MG capsule Take 2 capsules (600 mg) by mouth 3 times daily 540 capsule 1     norgestrel-ethinyl estradiol (LO/OVRAL) 0.3-30 MG-MCG per tablet Take 1 tablet by mouth daily 28 tablet 11     ALPRAZolam (XANAX) 0.25 MG tablet Take 1 tablet (0.25 mg) by mouth 3 times daily as needed for anxiety 30 tablet 0         Allergies:      No Known Allergies     Social History:     Social History   Substance Use Topics     Smoking status: Never Smoker     Smokeless tobacco: Never Used     Alcohol use 0.0 oz/week     0 Standard drinks or equivalent per week      Comment: 3 per week       History   Drug Use No         Family History:       Family History   Problem Relation Age of Onset     CANCER Maternal Grandfather      leukemia     Breast Cancer No family hx of      Cancer - colorectal No family hx of          Physical Exam:     /83  Pulse 86  Temp 98.2  F (36.8  C) (Oral)  Resp 18  Ht 1.753 m (5' 9\")  Wt 70.8 kg (156 lb 1.6 oz)  SpO2 96%  Breastfeeding? No  BMI 23.05 kg/m2  Body mass index is 23.05 kg/(m^2).    General Appearance: healthy and alert, no " distress     HEENT: no thyromegaly, no palpable nodules or masses        Cardiovascular: regular rate and rhythm, no gallops, rubs or murmurs     Respiratory: lungs clear, no rales, rhonchi or wheezes, normal diaphragmatic excursion    Musculoskeletal: extremities non tender and without edema    Skin: no lesions or rashes     Neurological: normal gait, no gross defects     Psychiatric: appropriate mood and affect                               Hematological: normal cervical, supraclavicular and inguinal lymph nodes     Gastrointestinal:       abdomen soft, non-tender, non-distended, no organomegaly or masses    Genitourinary: Patient declined due to recent imaging      Assessment:    Erika Ziegler is a 32 year old woman with a history of 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 She is here today for a surveillance visit.    20 minutes were spent with this patient, over 50% of that time was spent in symptom management, treatment planning and in counseling and coordination of care.      Plan:     1.)        Reviewed recent imaging and results released to [employer]. Patient to RTC in 6 months for her next surveillance visit; she will be due for her annual pap at that visit. She will be at her 5 year post treatment point at her next visit and she can then extend her surveillance to annually; at this time will plan on annual imaging but discussed that this may change to imaging only with symptoms. Will plan on a CT at her next visit. Reviewed signs and symptoms for when she should contact the clinic or seek additional care. Patient to contact the clinic with any questions or concerns in the interim. Filled out paperwork for her adoption and gave it to her.     2.) Genetic risk factors were assessed and the patient does not meet the qualifications for a referral.      3.) Labs and/or  tests ordered include:  Flu shot. CT cap.     4.) Health maintenance issues addressed today include annual health maintenance and non-gynecologic issues with PCP.    AMOL Whittaker, WHNP-BC, ANP-BC  Women's Health Nurse Practitioner  Adult Nurse Pracitioner  Division of Gynecologic Oncology      CC  Patient Care Team:  Saritha Rodney MD as PCP - General (Family Practice)  Shima Babcock MD as MD (Neurology)  Pancho Villareal MD as MD (Neurology)

## 2018-02-20 NOTE — MR AVS SNAPSHOT
After Visit Summary   2/20/2018    Eriak Ziegler    MRN: [id number]           Patient Information     Date Of Birth          1985        Visit Information        Provider Department      2/20/2018 2:30 PM Cheyanne Burleson APRN CNP [facility]        Today's Diagnoses     MALIG NEOPLASM EXOCERVIX    -  1       Follow-ups after your visit        Follow-up notes from your care team     Return in about 6 months (around 8/20/2018).      Future tests that were ordered for you today     Open Future Orders        Priority Expected Expires Ordered    CT Chest/Abdomen/Pelvis w Contrast Routine  2/20/2019 2/20/2018            Who to contact     If you have questions or need follow up information about today's clinic visit or your schedule please contact [facility] directly at [phone].  Normal or non-critical lab and imaging results will be communicated to you by [employer], letter or phone within 4 business days after the clinic has received the results. If you do not hear from us within 7 days, please contact the clinic through [employer] or phone. If you have a critical or abnormal lab result, we will notify you by phone as soon as possible.  Submit refill requests through [employer] or call your pharmacy and they will forward the refill request to us. Please allow 3 business days for your refill to be completed.          Additional Information About Your Visit        MyChart Information     [employer] gives you secure access to your electronic health record. If you see a primary care provider, you can also send messages to your care team and make appointments. If you have questions, please call your primary care clinic.  If you do not have a primary care provider, please call [phone] and they will assist you.        Care EveryWhere ID     This is your Care EveryWhere ID. This could be used by other organizations to access your [facility]  "records  [id number]        Your Vitals Were     Pulse Temperature Respirations Height Pulse Oximetry Breastfeeding?    86 98.2  F (36.8  C) (Oral) 18 1.753 m (5' 9\") 96% No    BMI (Body Mass Index)                   23.05 kg/m2            Blood Pressure from Last 3 Encounters:   02/20/18 135/83   10/05/17 129/80   05/18/17 123/82    Weight from Last 3 Encounters:   02/20/18 70.8 kg (156 lb 1.6 oz)   10/05/17 69.9 kg (154 lb)   05/18/17 69.9 kg (154 lb 1.6 oz)                 Today's Medication Changes          These changes are accurate as of 2/20/18  4:22 PM.  If you have any questions, ask your nurse or doctor.               Stop taking these medicines if you haven't already. Please contact your care team if you have questions.     L-Glutamine 500 MG Tabs   Stopped by:  Cheyanne Burleson APRN CNP           pyridoxine 100 MG tablet   Commonly known as:  VITAMIN B-6   Stopped by:  Cheyanne Burleson APRN CNP           VITAMIN D PO   Stopped by:  Cheyanne Burleson APRN CNP                    Primary Care Provider Office Phone # Fax #    Saritha Rodney [phone]       [address] [place] [address]        Equal Access to Services     ZIGGY MAYER : Hadii sergio yap hadasho Sodenise, waaxda luqadaha, qaybta kaalmada bentley, malu cuevas. So [facility] [phone].    ATENCIÓN: Si habla español, tiene a woodward disposición servicios gratuitos de asistencia lingüística. Llame al [phone].    We comply with applicable federal civil rights laws and [place] laws. We do not discriminate on the basis of race, color, national origin, age, disability, sex, sexual orientation, or gender identity.            Thank you!     Thank you for choosing [facility] [place] [facility]  for your care. Our goal is always to provide you with excellent care. Hearing back from our patients is one way we can continue to improve our services. Please take a few minutes to " complete the written survey that you may receive in the mail after your visit with us. Thank you!             Your Updated Medication List - Protect others around you: Learn how to safely use, store and throw away your medicines at www.disposemymeds.org.          This list is accurate as of 2/20/18  4:22 PM.  Always use your most recent med list.                   Brand Name Dispense Instructions for use Diagnosis    ALPRAZolam 0.25 MG tablet    XANAX    30 tablet    Take 1 tablet (0.25 mg) by mouth 3 times daily as needed for anxiety    Anxiety       gabapentin 300 MG capsule    NEURONTIN    540 capsule    Take 2 capsules (600 mg) by mouth 3 times daily    Chemotherapy-induced neuropathy (H)       norgestrel-ethinyl estradiol 0.3-30 MG-MCG per tablet    LO/OVRAL    28 tablet    Take 1 tablet by mouth daily    Malignant neoplasm of cervix, unspecified site (H)

## 2018-04-18 ASSESSMENT — ANXIETY QUESTIONNAIRES
GAD7 TOTAL SCORE: 0
GAD7 TOTAL SCORE: 0
7. FEELING AFRAID AS IF SOMETHING AWFUL MIGHT HAPPEN: NOT AT ALL
GAD7 TOTAL SCORE: 0
3. WORRYING TOO MUCH ABOUT DIFFERENT THINGS: NOT AT ALL
7. FEELING AFRAID AS IF SOMETHING AWFUL MIGHT HAPPEN: NOT AT ALL
2. NOT BEING ABLE TO STOP OR CONTROL WORRYING: NOT AT ALL
1. FEELING NERVOUS, ANXIOUS, OR ON EDGE: NOT AT ALL
5. BEING SO RESTLESS THAT IT IS HARD TO SIT STILL: NOT AT ALL
4. TROUBLE RELAXING: NOT AT ALL
6. BECOMING EASILY ANNOYED OR IRRITABLE: NOT AT ALL

## 2018-04-18 ASSESSMENT — PATIENT HEALTH QUESTIONNAIRE - PHQ9
10. IF YOU CHECKED OFF ANY PROBLEMS, HOW DIFFICULT HAVE THESE PROBLEMS MADE IT FOR YOU TO DO YOUR WORK, TAKE CARE OF THINGS AT HOME, OR GET ALONG WITH OTHER PEOPLE: NOT DIFFICULT AT ALL
SUM OF ALL RESPONSES TO PHQ QUESTIONS 1-9: 0
SUM OF ALL RESPONSES TO PHQ QUESTIONS 1-9: 0

## 2018-04-19 ASSESSMENT — ANXIETY QUESTIONNAIRES: GAD7 TOTAL SCORE: 0

## 2018-04-19 ASSESSMENT — PATIENT HEALTH QUESTIONNAIRE - PHQ9: SUM OF ALL RESPONSES TO PHQ QUESTIONS 1-9: 0

## 2018-04-25 ENCOUNTER — OFFICE VISIT (OUTPATIENT)
Dept: PSYCHOLOGY | Facility: CLINIC | Age: 33
End: 2018-04-25
Payer: COMMERCIAL

## 2018-04-25 DIAGNOSIS — F43.23 ADJUSTMENT DISORDER WITH MIXED ANXIETY AND DEPRESSED MOOD: Primary | ICD-10-CM

## 2018-04-25 NOTE — MR AVS SNAPSHOT
After Visit Summary   4/25/2018    Erika Ziegler    MRN: [id number]           Patient Information     Date Of Birth          1985        Visit Information        Provider Department      4/25/2018 4:00 PM Bernadette Wynn, PhD [facility]        Today's Diagnoses     Adjustment disorder with mixed anxiety and depressed mood    -  1       Follow-ups after your visit        Who to contact     Please call your clinic at [phone] to:    Ask questions about your health    Make or cancel appointments    Discuss your medicines    Learn about your test results    Speak to your doctor            Additional Information About Your Visit        MyChart Information     [email or web address] gives you secure access to your electronic health record. If you see a primary care provider, you can also send messages to your care team and make appointments. If you have questions, please call your primary care clinic.  If you do not have a primary care provider, please call [phone] and they will assist you.      [email or web address] is an electronic gateway that provides easy, online access to your medical records. With [email or web address], you can request a clinic appointment, read your test results, renew a prescription or communicate with your care team.     To access your existing account, please contact your [facility] or call [phone] for assistance.        Care EveryWhere ID     This is your Care EveryWhere ID. This could be used by other organizations to access your [place] medical records  [id number]         Blood Pressure from Last 3 Encounters:   02/20/18 135/83   10/05/17 129/80   05/18/17 123/82    Weight from Last 3 Encounters:   02/20/18 70.8 kg (156 lb 1.6 oz)   10/05/17 69.9 kg (154 lb)   05/18/17 69.9 kg (154 lb 1.6 oz)              Today, you had the following     No orders found for display       Primary Care Provider Office Phone # Fax #    Saritha Rodney MD  [phone]       [address]  [facility] [address]        Equal Access to Services     BERNADETTE MAYER : Hadii aad ku hadcarl Burgos, andida marion, carol kanaheedda bentley, malu triana laAnujjun cuevas. So [facility] [phone].    ATENCIÓN: Si habla español, tiene a woodward disposición servicios gratuitos de asistencia lingüística. Llame al [phone].    We comply with applicable federal civil rights laws and [place] laws. We do not discriminate on the basis of race, color, national origin, age, disability, sex, sexual orientation, or gender identity.            Thank you!     Thank you for choosing [facility]  for your care. Our goal is always to provide you with excellent care. Hearing back from our patients is one way we can continue to improve our services. Please take a few minutes to complete the written survey that you may receive in the mail after your visit with us. Thank you!             Your Updated Medication List - Protect others around you: Learn how to safely use, store and throw away your medicines at www.disposemymeds.org.          This list is accurate as of 4/25/18 11:59 PM.  Always use your most recent med list.                   Brand Name Dispense Instructions for use Diagnosis    ALPRAZolam 0.25 MG tablet    XANAX    30 tablet    Take 1 tablet (0.25 mg) by mouth 3 times daily as needed for anxiety    Anxiety       gabapentin 300 MG capsule    NEURONTIN    540 capsule    Take 2 capsules (600 mg) by mouth 3 times daily    Chemotherapy-induced neuropathy (H)       norgestrel-ethinyl estradiol 0.3-30 MG-MCG per tablet    LO/OVRAL    28 tablet    Take 1 tablet by mouth daily    Malignant neoplasm of cervix, unspecified site (H)

## 2018-04-29 NOTE — PROGRESS NOTES
Health Psychology                                      Department of Medicine                                           [facility] Mail Code 745    Lila Gross, Ph.D., L.P. [phone]  [address] [place] [address] Bernadette Wynn, Ph.D.,  L.P. [phone]  [place] [address]  Crispin Menjivar, Ph.D., A.B.P.P., L.P. [phone]      Patient name: Erika Ziegler  : 1985  Assessment Date: 2018    Assessment Procedures:   Erika Ziegler was administered the following psychological assessments:       Mental Status Examination     Clinical Interview     Patient Health Questionnaire (PHQ-9)    Generalized Anxiety Disorder (VIRGILIO-7) scale    CAGE-Adapted to Include Drugs (CAGE-AID) scale    World Health Organization Disability Assessment Scale (WHODAS) 2.0    Review of Prior Medical Records     Review of Prior Psychological Assessment         Confidential Summary of Health Psychology Consultation    History of Presenting Complaint:   Ms. Ziegler was diagnosed with cervical cancer in 2013, a few months after her marriage in 2012.  This was a difficult period of time especially as survival rates for her type of cancer were low.  During that time she lost her hair and her ability to conceive. She had trouble making plans because she didn't know if she would survive.  She was in therapy with me working on her anxiety and grief about not being able to have children.  She is now over 4 years cancer-free.  She and her  adopted a child since I last worked with her and she reports she is doing well.  She is coming in for a new assessment at the request of her adoption agency who want an assessment of her current functioning. .      Social history:  Ms. Ziegler is  and has one adopted son.  She reports that her family and her 's family are both very supportive.      Ms. Ziegler grew up in [place] and  "attended college and graduate school in [place].  She met her  in college.  They have been together for about 12 years and  for about 4. She has a younger sister.  Ms. 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 She reports enjoying her work.  She has a good support network of family and friends.      Ms. Ziegler has a number of health habits that have helped her to cope with her life's challenges including yoga and running.      Medical History:   Patient's medical record revealed that Erika Ziegler has been identified as having these medical conditions:   Patient Active Problem List   Diagnosis     Cervical ca (H)     MALIG NEOPLASM EXOCERVIX     Adjustment disorder with mixed anxiety and depressed mood     Pulmonary nodules        Patient's medical record lists the following medications at this time:   Current Outpatient Prescriptions   Medication Sig Dispense Refill     ALPRAZolam (XANAX) 0.25 MG tablet Take 1 tablet (0.25 mg) by mouth 3 times daily as needed for anxiety 30 tablet 0     gabapentin (NEURONTIN) 300 MG capsule Take 2 capsules (600 mg) by mouth 3 times daily 540 capsule 1     norgestrel-ethinyl estradiol (LO/OVRAL) 0.3-30 MG-MCG per tablet Take 1 tablet by mouth daily 28 tablet 11        Current estimated body mass index is:   Estimated body mass index is 23.05 kg/(m^2) as calculated from the following:    Height as of 2/20/18: 1.753 m (5' 9\").    Weight as of 2/20/18: 70.8 kg (156 lb 1.6 oz).     Patient's treatment team at the [facility] is:   Patient Care Team       Relationship Specialty Notifications Start End    Saritha Rodney MD PCP - General Family Practice  3/5/15     Phone: [phone] Fax: [phone]         [address] " [facility] [address]    Shima Bbacock MD MD Neurology  6/28/17     Phone: [phone] Fax: [phone]         [address] [facility] [address] [facility] [address]    Shima Babcock MD Referring Physician Neurology  7/13/17     Comment:  Referring to [facility] for Neuropathy    Phone: [phone] Fax: [phone]         [address] [facility] [address]    Pancho Villareal MD MD Neurology  7/13/17     Phone: [phone] Fax: [phone]         [address] [facility] [id number] [facility] [address]          Health behaviors:   Caffeine: 1 cup per day  Alcohol: 3 per week  Nicotine: none  Exercise: 3 times per week  Social Support: good (friends and family)    Psychiatric History:   Ms. Ziegler was in therapy with me for about a year in [id number] to help her cope with anxiety around cancer and loss of her ability to have children.     Family history includes.  Family History   Problem Relation Age of Onset     CANCER Maternal Grandfather      leukemia     Breast Cancer No family hx of      Cancer - colorectal No family hx of        Mental Status Examination:   Results of the mental status examination revealed an alert individual showing no signs of excessive distractibility.  The patient is 32 year old years old and appears his age.   The patient tracked the conversation well. The patient was on time and appropriately groomed and dressed.  The patient was cooperative throughout the interview and seemed to honestly respond to questioning. Patient maintained good eye contact and was oriented to person, place, and time. There was no evidence of psychomotor agitation or retardation.  Speech was logical and coherent and normal for rate, volume, and fluency. Vocabulary and grammar skills were suggestive of intellectual functioning within the average range.     The patient's attitude toward the interview was positive and engaged.  The patient's reported her mood was generally euthymic. Affect was  within the normal range and appropriate to content.  Behavior during interview suggested that patient s memory functioning is intact for both remote and immediate recall. The patient's thought process appeared to be both goal oriented and organized. Thought content revealed no evidence of delusions, paranoia, or suicidal/homicidal ideation. There was no evidence of visual or auditory hallucinations. Level of personal insight and social judgment appeared to be good.      Results of Assessments:  Anxiety  The VIRGILIO-7 is a measure of anxiety and panic symptoms.  Scores on this measure range from 0 to 21 with higher scores reflecting greater levels and frequency of anxiety symptoms.  The patient's score on the VIRGILIO-7 was in the normal range.      VIRGILIO-7 SCORE 4/18/2018   Total Score 0 (minimal anxiety)   Total Score 0       Depression  The PHQ-9 is a measure of depressive symptoms.  Scores on this measure range from 0 to 27 with higher scores reflecting greater levels and frequency of depressive symptoms.  The patient's score on the PHQ-9 was in the normal range.     Last PHQ-9 score on record= PHQ-9 SCORE 4/18/2018   Total Score MyChart 0   Total Score 0       Alcohol and Drug abuse  The CAGE-AID is a screening tool to assess for symptoms of alcohol or drug abuse or dependence. Scores on this measure range from 0 to 4, with higher scores reflecting experiences consistent with problem use.   Scores 0.     Disability Assessment  The WHODAS is a disability assessment instrument that is based on the conceptual framework of the International Classification of Functioning, Disability, and Health.   The 12-item version of this scale was administered on this date.     WHODAS 2.0 TOTAL SCORES 4/18/2018   Total Score 14       Summary and Recommendations:   Based on this interview and results from the assessments administered on this date, Erika Ziegler appears to be doing quite well. She reports some continued anxiety about  cancer screens, but much less than previously.  She has good coping skills and has a healthy support system.  Her current work involves some stresses, but not unusually so.  Patient is doing quite well.     Recommendations based on this evaluation include:    Completed form for patient's adoption program.  Should she find it helpful in the future, I am open to and happy to see her again.     Diagnosis:  Axis I Adjustment Disorder with Anxiety and Depressive Symptoms, in remission   Axis II None   Axis III See medical history   Axis IV Psychosocial and Environmental Stressors: cannot become pregnant               Bernadette Wynn, PhD  Licensed Psychologist  Pager: [phone]

## 2018-08-07 DIAGNOSIS — C53.9 MALIGNANT NEOPLASM OF CERVIX, UNSPECIFIED SITE (H): Primary | ICD-10-CM

## 2018-08-13 ENCOUNTER — RADIANT APPOINTMENT (OUTPATIENT)
Dept: CT IMAGING | Facility: CLINIC | Age: 33
End: 2018-08-13
Attending: NURSE PRACTITIONER
Payer: COMMERCIAL

## 2018-08-13 DIAGNOSIS — C53.1 MALIGNANT NEOPLASM OF EXOCERVIX (H): ICD-10-CM

## 2018-08-13 RX ORDER — IOPAMIDOL 755 MG/ML
96 INJECTION, SOLUTION INTRAVASCULAR ONCE
Status: COMPLETED | OUTPATIENT
Start: 2018-08-13 | End: 2018-08-13

## 2018-08-13 RX ADMIN — IOPAMIDOL 96 ML: 755 INJECTION, SOLUTION INTRAVASCULAR at 08:29

## 2018-09-13 ENCOUNTER — ONCOLOGY VISIT (OUTPATIENT)
Dept: ONCOLOGY | Facility: CLINIC | Age: 33
End: 2018-09-13
Attending: NURSE PRACTITIONER
Payer: COMMERCIAL

## 2018-09-13 VITALS
BODY MASS INDEX: 22.69 KG/M2 | HEIGHT: 69 IN | RESPIRATION RATE: 14 BRPM | HEART RATE: 81 BPM | DIASTOLIC BLOOD PRESSURE: 87 MMHG | TEMPERATURE: 98.2 F | SYSTOLIC BLOOD PRESSURE: 137 MMHG | OXYGEN SATURATION: 99 % | WEIGHT: 153.2 LBS

## 2018-09-13 DIAGNOSIS — C53.1 MALIGNANT NEOPLASM OF EXOCERVIX (H): Primary | ICD-10-CM

## 2018-09-13 PROCEDURE — 99213 OFFICE O/P EST LOW 20 MIN: CPT | Mod: ZP | Performed by: NURSE PRACTITIONER

## 2018-09-13 PROCEDURE — G0463 HOSPITAL OUTPT CLINIC VISIT: HCPCS | Mod: ZF

## 2018-09-13 PROCEDURE — 88175 CYTOPATH C/V AUTO FLUID REDO: CPT | Performed by: NURSE PRACTITIONER

## 2018-09-13 ASSESSMENT — ENCOUNTER SYMPTOMS
SHORTNESS OF BREATH: 0
SINUS PAIN: 0
ORTHOPNEA: 0
NAIL CHANGES: 0
JOINT SWELLING: 0
DIARRHEA: 0
EYE WATERING: 0
HEARTBURN: 0
LOSS OF CONSCIOUSNESS: 0
VOMITING: 0
SWOLLEN GLANDS: 0
TREMORS: 0
SMELL DISTURBANCE: 0
NERVOUS/ANXIOUS: 0
WHEEZING: 0
EYE IRRITATION: 0
WEAKNESS: 0
BRUISES/BLEEDS EASILY: 0
COUGH: 0
HEMOPTYSIS: 0
NIGHT SWEATS: 0
SPUTUM PRODUCTION: 0
RECTAL PAIN: 0
LEG SWELLING: 0
DYSURIA: 0
HOT FLASHES: 0
ARTHRALGIAS: 0
ABDOMINAL PAIN: 0
DIFFICULTY URINATING: 0
BACK PAIN: 0
RESPIRATORY PAIN: 0
SLEEP DISTURBANCES DUE TO BREATHING: 0
SYNCOPE: 0
TACHYCARDIA: 0
SINUS CONGESTION: 0
RECTAL BLEEDING: 0
NUMBNESS: 0
LEG PAIN: 0
NAUSEA: 0
DISTURBANCES IN COORDINATION: 0
MUSCLE CRAMPS: 0
SKIN CHANGES: 0
BLOOD IN STOOL: 0
PANIC: 0
DECREASED LIBIDO: 0
CHILLS: 0
EYE REDNESS: 0
POSTURAL DYSPNEA: 0
FEVER: 0
HEADACHES: 0
EYE PAIN: 0
SPEECH CHANGE: 0
POLYDIPSIA: 0
FATIGUE: 0
FLANK PAIN: 0
BLOATING: 0
SEIZURES: 0
DECREASED CONCENTRATION: 0
STIFFNESS: 0
HYPOTENSION: 0
TINGLING: 0
CONSTIPATION: 0
HYPERTENSION: 0
TASTE DISTURBANCE: 0
MEMORY LOSS: 0
DIZZINESS: 0
MUSCLE WEAKNESS: 0
HALLUCINATIONS: 0
INCREASED ENERGY: 0
NECK MASS: 0
EXTREMITY NUMBNESS: 0
DEPRESSION: 0
HOARSE VOICE: 0
LIGHT-HEADEDNESS: 0
BREAST PAIN: 0
SORE THROAT: 0
ALTERED TEMPERATURE REGULATION: 0
BOWEL INCONTINENCE: 0
DECREASED APPETITE: 0
POLYPHAGIA: 0
BREAST MASS: 0
PARALYSIS: 0
WEIGHT LOSS: 0
INSOMNIA: 0
DYSPNEA ON EXERTION: 0
PALPITATIONS: 0
JAUNDICE: 0
MYALGIAS: 0
NECK PAIN: 0
WEIGHT GAIN: 0
HEMATURIA: 0
TROUBLE SWALLOWING: 0
DOUBLE VISION: 0
CLAUDICATION: 0
COUGH DISTURBING SLEEP: 0
EXERCISE INTOLERANCE: 0
SNORES LOUDLY: 0
POOR WOUND HEALING: 0

## 2018-09-13 ASSESSMENT — PAIN SCALES - GENERAL: PAINLEVEL: NO PAIN (0)

## 2018-09-13 NOTE — PROGRESS NOTES
Follow Up Notes on Referred Patient    Date: 2018       Dr. Saritha Rodney, MD  [address] [address]       RE: Erika Ziegler  : 1985  HUMBERTO: 2018    Dear Dr. 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 She is here today for a surveillance visit, annual pap, and CT review.      Course to date:   Erika had some post coital bleeding and was seen by Dr. Silva for her annual visit. On pelvic examination she was noted to have a friable cervical mass for which a pap smear was obtained. She then underwent a colposcopy with biopsies taken with above diagnosis made. She would like to have children in the future and the current plan is to preserve her ovaries and undergo oocyte and/embryo preservation with surrogate carrier.   Pap/colpo history:   5/10/4: NIL   05: LSIL   3/2006 colpo with mild dysplasia   06: LSIL pap   07: ASCUS +HPV   07: LSIL   2008: colpo with mild dysplasia   08 & 2009: LSIL   2009: ANDRZEJ I-II   3/2010: colpo ANDRZEJ I   11/5/10, 11, 12: NIL   13: ASC-H, JERRI   2013: colpo with poorly differentiated carcinoma with neuroendocrine differentiation and partial tumor necrosis   13: PET CT with 6.5 x 5.2 cm cervical mass; tiny focus of increased metabolism in the right midpelvis laterally. The ureter at this level is difficult to follow. This may represent some activity in the distal ureter through it is difficult to completely exclude activity in a small non enlarged pelvic lymph node. Chest lear, no other evidence of mets.   13: Met with reproductive endocrinology to discuss fertility options. Per their recommendation, given the cervical cancer diagnosis and the size of the lesion,  "the patient is not a suitable candidate for transvaginal oocyte retrieval. Transabdominal ultrasound was performed in addition to transvaginal ultrasound, to evaluate for possible transabdominal oocyte retrieval, with ovaries positioned low in the pelvis precluding safe transabdominal oocyte retrieval. Discussed option of ovarian tissue cryopreservation and ovarian translocation prior to radiation therapy.   5/1/13: MR PELVIS IMPRESSION:  1. 5.1 x 3.1 x 5.9 cm enhancing cervical mass extending of the posterior aspect of the cervix not involving the vagina or uterus no evidence of parametrial spread.  2. Two small nonspecific pelvic lymph nodes, 1.0 x 0.6 cm left pelvic lymph node series 6 image 6, 0.6 x 1.0 cm right pelvic lymph node on image 6.  5/13/13: laparoscopy, left ovarian transposition, right salpingo oophorectomy (reproductive medicine taking right ovary after surgery)   5/20/13-6/17/13: Cycle #1-3 Etoposide/Cisplatin; began EBRT   6/24/13: Insertion of High Dose Rate Tandem and Ring for Iridium-192 implant. Pt tolerated well.   7/9/13: Completed brachytherapy  8/2/13-8/21/13: Cycle #1-2 Taxol/Carbo  9/11/13 PET/CT IMPRESSION:  1. No evidence of FDG avid malignancy in the neck, chest, abdomen, or pelvis.  2. Inflammatory changes in the presacral and perirectal spaces, likely post radiation change.  9/13/13-10/4/13: Cycle #3-4 Taxol/Carbo  12/16/13: PET/CT IMPRESSION:  1. No evidence of hypermetabolic activity within the neck, chest, abdomen, or pelvis to suggest active or metastatic disease.  2. Persistent inflammatory changes within the low pelvis, most compatible with posttreatment change, without associated hypermetabolic activity to suggest local recurrence.  12/18/13: recovering relatively well from treatment. She still has neuropathy in her feet. It is constant, annoying tingling and numbness in her toes. She has some relief with gabapentin and B6/L-glutamine. She also still has some \"digestive " "upset\" since finishing radiation therapy, has diarrhea especially in the morning. She also had some chest discomfort last week, but this has since resolved and she attributes it to anxiety after meeting a patient with similar cancer to hers that has metastasized to lungs. She also continues to have some bleeding and discomfort with use of vaginal dilator. She still takes OCPs and has NOT been skipping sugar pill week. She does not feel she experiences any menopausal symptoms on these off weeks but has also not been monitoring it closely. Amenorrhea still.   3/17/14: CT C/A/P IMPRESSION: No CT scan findings in the chest, abdomen, or pelvis to indicate recurrent or metastatic tumor. Unchanged mild free fluid in the pelvis.  3/19/14 pap NIL  6/10/14: CT C/A/P Impression:   1. No evidence of recurrent or metastatic cervical cancer in the chest, abdomen, or pelvis.   2. New subtle wall thickening of the small bowel loops in the low abdomen, likely sequelae of prior radiation.   3. Stable nonspecific pulmonary nodules measuring up to 3 mm since at least 9/11/2013. Continued followup recommended until 12 month stability is documented. No new pulmonary nodule.   6/11/14: Pap NIL.  9/2/14: Pap NIL, HPV negative. CT cap showed: No evidence of recurrence or metastatic lesion in chest, abdomen, or pelvis. Stable sub-4 mm pulmonary nodules.  12/2/14: Pap LSIL, HPV negative. CT cap showed: Impression:   1. No evidence of recurrent local or metastatic disease in the chest, abdomen, or pelvis.  2. Stable sub-4 mm pulmonary nodule on the left. No new pulmonary nodules.  2/17/15: CT C/A/P: IMPRESSION:   1. Left lower lobe 2 mm nodule unchanged since initial imaging on 9/11/2013.  2. No evidence of recurrent local or metastatic disease in the chest, abdomen, or pelvis.  3/5/15: Pap ASC-H, LSIL also present, HPV 18 postive.    4/4/15: ED visit for 4 days of abdominal pain, increasing in severity with increased frequency of urination " and bowel movements over the past 2 days as well. She also complains of abdominal distention. Gyn onc consult in hospital did not recommend CT at that time unless symptoms worsen. Discharged same day.   XRay abdomen: Nonobstructive bowel gas pattern. No free intraperitoneal air.      4/16/15: Colpo done. No aceto-white changes identified. No biopsies done. Plan to repeat pap in June as well as CT.       6/9/15: CT cap verbal preliminary report by Dr. Eric is no change in pulmonary nodules and no evidence of recurrence/metastatic disease. Pap pending.       Addendum: CT cap IMPRESSION:   1. Indeterminate 12 mm hypodensity within the uterine fundus may represent fluid within the endometrial canal secondary to cervical stenosis. Underlying uterine lesion not excluded. Initially, a dedicated pelvic ultrasound is recommended for further assessment.  2. No evidence of metastatic disease.  3. 2 mm nodules in the left lung are unchanged since at least 9/11/2013, and are statistically benign.       Plan for U/S for further evaluation of uterus.      7/2/15: U/S results pending. Verbal report per Dr. Guajardo shows a solid fibroid like area which is not fluid; recommend f/u with additional imaging.       U/S final IMPRESSION:   1. Relatively well-defined small mixed echogenicity myometrial mass in the uterine fundus. Fibroid could have this appearance, however given patient's history of cervical malignancy and radiation, consider a 3-6 month followup to ensure stability.  2. Ovaries are not identified.  3. Trace free fluid in the pelvis.      9/1/15: CT cap IMPRESSION:    1. No evidence of metastatic disease in the chest, abdomen, or pelvis.  2. Stable uterine fundus hypodensity most consistent with submucosal fibroid as described on pelvic ultrasound 7/2/2015. However, given the patient's history of cervical cancer, short-term followup ultrasound in 3-6 months is recommended.  3. Postsurgical changes of right  salpingo-oophorectomy and left  Salpingectomy.      9/1/15: pap NIL, neg HPV.       12/4/15: pelvic ultrasound FINDINGS:  The uterus measures 5.3 x 3.4 x 2.0 cm. The endometrium is within normal limits and measures 2.0 mm. There is no free fluid in the pelvis. Redemonstration of a heterogeneous appearing circumscribed submucosal mass in the uterine fundus measuring 10 x 8 x 6 mm, previously 8 x 13 x 9 mm. Unchanged calcification in the lower uterine segment. No new masses are identified. The right ovary is surgically absent. The left ovary was not visualized. No adnexal masses.  No focal abnormality of the visualized portions of the bladder.  IMPRESSION:    Mild decrease in the submucosal mass in the uterine fundus from 7/2/2015, which most likely represents a benign fibroid. No other suspicious masses are identified.      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       6//2016: Pap NIL. 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      12/6/16: CT cap IMPRESSION:   1. In this patient with history of cervical cancer there is no evidence of recurrent or metastatic disease in the chest, abdomen and pelvis.   2. Tiny pulmonary nodules are unchanged since 9/11/2013.      5/18/17: Pap NIL.  8/14/17: CT cap IMPRESSION: No evidence of recurrent or metastatic disease in the chest, abdomen and pelvis  2/20/18: CT cap IMPRESSION: No evidence of recurrent or metastatic disease in the chest, abdomen or pelvis.  8/13/18: CT cap IMPRESSION: In this patient with history of neuroendocrine carcinoma of the cervix status post chemotherapy and radiation therapy, there is no evidence of recurrence or metastatic  disease in the chest, abdomen, or pelvis.     9/13/18: Pap pending.         Today she comes to clinic feeling well. She denies any vaginal bleeding, no changes in her bowel or bladder habits, no nausea/emesis, no lower extremity edema, and no difficulties eating or sleeping. She denies any abdominal discomfort/bloating, no fevers or chills, and no chest pain or shortness of breath. She continues to take her OCPs and does not need a refill. She is sexually active and using a lubricant as well as changing post ions for comfort but does notice that her vagina is shorter. She previously used a dilator but not currently. She needs to establish care with a PCP. She is in the latter stages of adoption for a second child. She is currently taking Gabapentin 300 mg at bedtime.           Review of Systems     Constitutional:  Negative for fever, chills, weight loss, weight gain, fatigue, decreased appetite, night sweats, recent stressors, height gain, height loss, post-operative complications, incisional pain, hallucinations, increased energy, hyperactivity and confused.   HENT:  Negative for ear pain, hearing loss, tinnitus, nosebleeds, trouble swallowing, hoarse voice, mouth sores, sore throat, ear discharge, tooth pain, gum tenderness, taste disturbance, smell disturbance, hearing aid, bleeding gums, dry mouth, sinus pain, sinus congestion and neck mass.    Eyes:  Negative for double vision, pain, redness, eye pain, decreased vision, eye watering, eye bulging, eye dryness, flashing lights, spots, floaters, strabismus, tunnel vision, jaundice and eye irritation.   Respiratory:   Negative for cough, hemoptysis, sputum production, shortness of breath, wheezing, sleep disturbances due to breathing, snores loudly, respiratory pain, dyspnea on exertion, cough disturbing sleep and postural dyspnea.    Cardiovascular:  Negative for chest pain, dyspnea on exertion, palpitations, orthopnea, claudication, leg swelling, fingers/toes  turn blue, hypertension, hypotension, syncope, history of heart murmur, chest pain on exertion, chest pain at rest, pacemaker, few scattered varicosities, leg pain, sleep disturbances due to breathing, tachycardia, light-headedness, exercise intolerance and edema.   Gastrointestinal:  Negative for heartburn, nausea, vomiting, abdominal pain, diarrhea, constipation, blood in stool, melena, rectal pain, bloating, hemorrhoids, bowel incontinence, jaundice, rectal bleeding, coffee ground emesis and change in stool.   Genitourinary:  Negative for bladder incontinence, dysuria, urgency, hematuria, flank pain, vaginal discharge, difficulty urinating, genital sores, dyspareunia, decreased libido, nocturia, voiding less frequently, arousal difficulty, abnormal vaginal bleeding, excessive menstruation, menstrual changes, hot flashes, vaginal dryness and postmenopausal bleeding.   Musculoskeletal:  Negative for myalgias, back pain, joint swelling, arthralgias, stiffness, muscle cramps, neck pain, bone pain, muscle weakness and fracture.   Skin:  Negative for nail changes, itching, poor wound healing, rash, hair changes, skin changes, acne, warts, poor wound healing, scarring, flaky skin, Raynaud's phenomenon, sensitivity to sunlight and skin thickening.   Neurological:  Negative for dizziness, tingling, tremors, speech change, seizures, loss of consciousness, weakness, light-headedness, numbness, headaches, disturbances in coordination, extremity numbness, memory loss, difficulty walking and paralysis.   Endo/Heme:  Negative for anemia, swollen glands and bruises/bleeds easily.   Psychiatric/Behavioral:  Negative for depression, hallucinations, memory loss, decreased concentration, mood swings and panic attacks.    Breast:  Negative for breast discharge, breast mass, breast pain and nipple retraction.   Endocrine:  Negative for altered temperature regulation, polyphagia, polydipsia, unwanted hair growth and change in facial  hair.        Past Medical History:    Past Medical History:   Diagnosis Date     Cervix cancer (H) 4/2013    neuroendocrine         Past Surgical History:    Past Surgical History:   Procedure Laterality Date     BIOPSY       EXAM UNDER ANESTHESIA, INSERT JOESPH SLEEVE, UTERINE PLACEMENT OF TANDEM AND RING FOR RAD, ULTRASOUND  6/24/2013    Procedure: EXAM UNDER ANESTHESIA, INSERT JOESPH SLEEVE, UTERINE PLACEMENT OF TANDEM AND RING FOR RADIATION, ULTRASOUND GUIDED;  Pelvic Exam, Insert JOESPH Sleeve with Ultrasound Guidance and Place Tandem Ring for High Dose Radiation;  Surgeon: Roxy Brar MD;  Location: [facility] OR     LAPAROSCOPIC SALPINGO-OOPHORECTOMY  5/13/2013    Procedure: LAPAROSCOPIC SALPINGO-OOPHORECTOMY;  Laparoscopy, Left  salpingectomy,Ovarian Transposition, Right Salpingo Oophorectomy , Anesthesia General with block;  Surgeon: Deanna Salinas MD;  Location: [facility] OR     wisdom teeth           Health Maintenance Due   Topic Date Due     TETANUS IMMUNIZATION (SYSTEM ASSIGNED)  10/25/2003     DEPRESSION ACTION PLAN Q1 YR  10/25/2003     HIV SCREEN (SYSTEM ASSIGNED)  10/25/2003     INFLUENZA VACCINE (1) 09/01/2018       Current Medications:     Current Outpatient Prescriptions   Medication Sig Dispense Refill     ALPRAZolam (XANAX) 0.25 MG tablet Take 1 tablet (0.25 mg) by mouth 3 times daily as needed for anxiety 10 tablet 0     gabapentin (NEURONTIN) 300 MG capsule Take 2 capsules (600 mg) by mouth 3 times daily (Patient taking differently: Take 600 mg by mouth daily ) 540 capsule 1     norgestrel-ethinyl estradiol (LO/OVRAL) 0.3-30 MG-MCG per tablet Take 1 tablet by mouth daily 28 tablet 11     iohexol (OMNIPAQUE) 140 MG/ML SOLN solution Mix entire bottle (50ml) of contast with 600ml (20 ounces) of water and drink half 2 hrs prior to CT scan and half 1 hr prior to scan (Patient not taking: Reported on 9/13/2018) 140 mL 0         Allergies:      No Known Allergies     Social History:     Social History  "  Substance Use Topics     Smoking status: Never Smoker     Smokeless tobacco: Never Used     Alcohol use 0.0 oz/week     0 Standard drinks or equivalent per week      Comment: 3 per week       History   Drug Use No         Family History:         Family History   Problem Relation Age of Onset     Cancer Maternal Grandfather      leukemia     Breast Cancer No family hx of      Cancer - colorectal No family hx of          Physical Exam:     /87 (BP Location: Right arm, Patient Position: Chair, Cuff Size: Adult Regular)  Pulse 81  Temp 98.2  F (36.8  C) (Oral)  Resp 14  Ht 1.753 m (5' 9.02\")  Wt 69.5 kg (153 lb 3.2 oz)  SpO2 99%  BMI 22.61 kg/m2  Body mass index is 22.61 kg/(m^2).    General Appearance: healthy and alert, no distress     HEENT: no thyromegaly, no palpable nodules or masses        Cardiovascular: regular rate and rhythm, no gallops, rubs or murmurs     Respiratory: lungs clear, no rales, rhonchi or wheezes, normal diaphragmatic excursion    Musculoskeletal: extremities non tender and without edema    Skin: no lesions or rashes     Neurological: normal gait, no gross defects     Psychiatric: appropriate mood and affect                               Hematological: normal cervical, supraclavicular and inguinal lymph nodes     Gastrointestinal:       abdomen soft, non-tender, non-distended, no organomegaly or masses    Genitourinary: External genitalia and urethral meatus appears normal.  Vagina is smooth without nodularity or masses, foreshortened.  Cervix not visualized.  Exam limited given patient discomfort/guarding and foreshortening. Bimanual exam reveal no masses, nodularity or fullness.  Recto-vaginal exam confirms these findings. Pap collected.       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 She is here today for a surveillance visit, annual pap, and CT review.    20 minutes were spent with this patient, over 50% of that time was spent in symptom management, treatment planning and in counseling and coordination of care.      Plan:     1.)        She is now 5 years out from treatment completion and can extend her surveillance to annually. She will continue to have an annual pap (no HPV); this can be done here or with her local provider; she prefers to be seen here. Will speak to Dr. Salinas regarding the role of any imaging now that she is 5 years out. Recent CT discussed and remains VALERIO. Reviewed signs and symptoms for when she should contact the clinic or seek additional care. Patient to contact the clinic with any questions or concerns. Discussed using the dilator prior to intercourse as well as using the dilator or a vibrator on a regular basis to maintain the vaginal length she currently has.      2.) Genetic risk factors were assessed and the patient does not meet the qualifications for a referral.      3.) Labs and/or tests ordered include:  Pap.      4.) Health maintenance issues addressed today include annual health maintenance and non-gynecologic issues with PCP.    5.)        She verbalized understanding of the above.     AMOL Whittaker, WHNP-BC, ANP-BC  Women's Health Nurse Practitioner  Adult Nurse Pracitioner  Division of Gynecologic Oncology          CC  Patient Care Team:  Tanvi Cullen MD as PCP - General (Family Practice)  Shima Babcock MD as MD (Neurology)  Shima Babcock MD as Referring Physician (Neurology)  Pancho Villareal MD as MD (Neurology)  TANVI CULLEN

## 2018-09-13 NOTE — LETTER
2018       RE: Erika Ziegler  [address]  [facility] [address]     Dear Colleague,    Thank you for referring your patient, Erika Ziegelr, to the [facility]. Please see a copy of my visit note below.                Follow Up Notes on Referred Patient    Date: 2018       Dr. Saritha Rodney MD  [address]  [place] [address]       RE: Erika Ziegler  : 1985  HUMBERTO: 2018    Dear Dr. 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 She is here today for a surveillance visit, annual pap, and CT review.      Course to date:   Erika had some post coital bleeding and was seen by Dr. Silva for her annual visit. On pelvic examination she was noted to have a friable cervical mass for which a pap smear was obtained. She then underwent a colposcopy with biopsies taken with above diagnosis made. She would like to have children in the future and the current plan is to preserve her ovaries and undergo oocyte and/embryo preservation with surrogate carrier.   Pap/colpo history:   5/10/4: NIL   05: LSIL   3/2006 colpo with mild dysplasia   06: LSIL pap   07: ASCUS +HPV   07: LSIL   2008: colpo with mild dysplasia   08 & 2009: LSIL   2009: ANDRZEJ I-II   3/2010: colpo ANDRZEJ I   11/5/10, 11, 12: NIL   13: ASC-H, JERRI   2013: colpo with poorly differentiated carcinoma with neuroendocrine differentiation and partial tumor necrosis   13: PET CT with 6.5 x 5.2 cm cervical mass; tiny focus of increased metabolism in the right midpelvis laterally. The ureter at this level is difficult to follow. This may represent some activity in the distal ureter through it is difficult to completely exclude activity in  a small non enlarged pelvic lymph node. Chest lear, no other evidence of mets.   5/1/13: Met with reproductive endocrinology to discuss fertility options. Per their recommendation, given the cervical cancer diagnosis and the size of the lesion, the patient is not a suitable candidate for transvaginal oocyte retrieval. Transabdominal ultrasound was performed in addition to transvaginal ultrasound, to evaluate for possible transabdominal oocyte retrieval, with ovaries positioned low in the pelvis precluding safe transabdominal oocyte retrieval. Discussed option of ovarian tissue cryopreservation and ovarian translocation prior to radiation therapy.   5/1/13: MR PELVIS IMPRESSION:  1. 5.1 x 3.1 x 5.9 cm enhancing cervical mass extending of the posterior aspect of the cervix not involving the vagina or uterus no evidence of parametrial spread.  2. Two small nonspecific pelvic lymph nodes, 1.0 x 0.6 cm left pelvic lymph node series 6 image 6, 0.6 x 1.0 cm right pelvic lymph node on image 6.  5/13/13: laparoscopy, left ovarian transposition, right salpingo oophorectomy (reproductive medicine taking right ovary after surgery)   5/20/13-6/17/13: Cycle #1-3 Etoposide/Cisplatin; began EBRT   6/24/13: Insertion of High Dose Rate Tandem and Ring for Iridium-192 implant. Pt tolerated well.   7/9/13: Completed brachytherapy  8/2/13-8/21/13: Cycle #1-2 Taxol/Carbo  9/11/13 PET/CT IMPRESSION:  1. No evidence of FDG avid malignancy in the neck, chest, abdomen, or pelvis.  2. Inflammatory changes in the presacral and perirectal spaces, likely post radiation change.  9/13/13-10/4/13: Cycle #3-4 Taxol/Carbo  12/16/13: PET/CT IMPRESSION:  1. No evidence of hypermetabolic activity within the neck, chest, abdomen, or pelvis to suggest active or metastatic disease.  2. Persistent inflammatory changes within the low pelvis, most compatible with posttreatment change, without associated hypermetabolic activity to suggest local  "recurrence.  12/18/13: recovering relatively well from treatment. She still has neuropathy in her feet. It is constant, annoying tingling and numbness in her toes. She has some relief with gabapentin and B6/L-glutamine. She also still has some \"digestive upset\" since finishing radiation therapy, has diarrhea especially in the morning. She also had some chest discomfort last week, but this has since resolved and she attributes it to anxiety after meeting a patient with similar cancer to hers that has metastasized to lungs. She also continues to have some bleeding and discomfort with use of vaginal dilator. She still takes OCPs and has NOT been skipping sugar pill week. She does not feel she experiences any menopausal symptoms on these off weeks but has also not been monitoring it closely. Amenorrhea still.   3/17/14: CT C/A/P IMPRESSION: No CT scan findings in the chest, abdomen, or pelvis to indicate recurrent or metastatic tumor. Unchanged mild free fluid in the pelvis.  3/19/14 pap NIL  6/10/14: CT C/A/P Impression:   1. No evidence of recurrent or metastatic cervical cancer in the chest, abdomen, or pelvis.   2. New subtle wall thickening of the small bowel loops in the low abdomen, likely sequelae of prior radiation.   3. Stable nonspecific pulmonary nodules measuring up to 3 mm since at least 9/11/2013. Continued followup recommended until 12 month stability is documented. No new pulmonary nodule.   6/11/14: Pap NIL.  9/2/14: Pap NIL, HPV negative. CT cap showed: No evidence of recurrence or metastatic lesion in chest, abdomen, or pelvis. Stable sub-4 mm pulmonary nodules.  12/2/14: Pap LSIL, HPV negative. CT cap showed: Impression:   1. No evidence of recurrent local or metastatic disease in the chest, abdomen, or pelvis.  2. Stable sub-4 mm pulmonary nodule on the left. No new pulmonary nodules.  2/17/15: CT C/A/P: IMPRESSION:   1. Left lower lobe 2 mm nodule unchanged since initial imaging on 9/11/2013.  2. " No evidence of recurrent local or metastatic disease in the chest, abdomen, or pelvis.  3/5/15: Pap ASC-H, LSIL also present, HPV 18 postive.    4/4/15: ED visit for 4 days of abdominal pain, increasing in severity with increased frequency of urination and bowel movements over the past 2 days as well. She also complains of abdominal distention. Gyn onc consult in hospital did not recommend CT at that time unless symptoms worsen. Discharged same day.   XRay abdomen: Nonobstructive bowel gas pattern. No free intraperitoneal air.      4/16/15: Colpo done. No aceto-white changes identified. No biopsies done. Plan to repeat pap in June as well as CT.       6/9/15: CT cap verbal preliminary report by Dr. Eric is no change in pulmonary nodules and no evidence of recurrence/metastatic disease. Pap pending.       Addendum: CT cap IMPRESSION:   1. Indeterminate 12 mm hypodensity within the uterine fundus may represent fluid within the endometrial canal secondary to cervical stenosis. Underlying uterine lesion not excluded. Initially, a dedicated pelvic ultrasound is recommended for further assessment.  2. No evidence of metastatic disease.  3. 2 mm nodules in the left lung are unchanged since at least 9/11/2013, and are statistically benign.       Plan for U/S for further evaluation of uterus.      7/2/15: U/S results pending. Verbal report per Dr. Guajardo shows a solid fibroid like area which is not fluid; recommend f/u with additional imaging.       U/S final IMPRESSION:   1. Relatively well-defined small mixed echogenicity myometrial mass in the uterine fundus. Fibroid could have this appearance, however given patient's history of cervical malignancy and radiation, consider a 3-6 month followup to ensure stability.  2. Ovaries are not identified.  3. Trace free fluid in the pelvis.      9/1/15: CT cap IMPRESSION:    1. No evidence of metastatic disease in the chest, abdomen, or pelvis.  2. Stable uterine fundus  hypodensity most consistent with submucosal fibroid as described on pelvic ultrasound 7/2/2015. However, given the patient's history of cervical cancer, short-term followup ultrasound in 3-6 months is recommended.  3. Postsurgical changes of right salpingo-oophorectomy and left  Salpingectomy.      9/1/15: pap NIL, neg HPV.       12/4/15: pelvic ultrasound FINDINGS:  The uterus measures 5.3 x 3.4 x 2.0 cm. The endometrium is within normal limits and measures 2.0 mm. There is no free fluid in the pelvis. Redemonstration of a heterogeneous appearing circumscribed submucosal mass in the uterine fundus measuring 10 x 8 x 6 mm, previously 8 x 13 x 9 mm. Unchanged calcification in the lower uterine segment. No new masses are identified. The right ovary is surgically absent. The left ovary was not visualized. No adnexal masses.  No focal abnormality of the visualized portions of the bladder.  IMPRESSION:    Mild decrease in the submucosal mass in the uterine fundus from 7/2/2015, which most likely represents a benign fibroid. No other suspicious masses are identified.      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       6//2016: Pap NIL. 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      12/6/16: CT cap IMPRESSION:   1. In this patient with history of cervical cancer there is no evidence of recurrent or metastatic disease in the chest, abdomen and pelvis.   2. Tiny pulmonary nodules are unchanged since 9/11/2013.      5/18/17: Pap NIL.  8/14/17: CT cap IMPRESSION: No evidence of recurrent or metastatic disease in the chest, abdomen and pelvis  2/20/18: CT cap IMPRESSION: No evidence of recurrent or  metastatic disease in the chest, abdomen or pelvis.  8/13/18: CT cap IMPRESSION: In this patient with history of neuroendocrine carcinoma of the cervix status post chemotherapy and radiation therapy, there is no evidence of recurrence or metastatic disease in the chest, abdomen, or pelvis.     9/13/18: Pap pending.         Today she comes to clinic feeling well. She denies any vaginal bleeding, no changes in her bowel or bladder habits, no nausea/emesis, no lower extremity edema, and no difficulties eating or sleeping. She denies any abdominal discomfort/bloating, no fevers or chills, and no chest pain or shortness of breath. She continues to take her OCPs and does not need a refill. She is sexually active and using a lubricant as well as changing post ions for comfort but does notice that her vagina is shorter. She previously used a dilator but not currently. She needs to establish care with a PCP. She is in the latter stages of adoption for a second child. She is currently taking Gabapentin 300 mg at bedtime.           Review of Systems     Constitutional:  Negative for fever, chills, weight loss, weight gain, fatigue, decreased appetite, night sweats, recent stressors, height gain, height loss, post-operative complications, incisional pain, hallucinations, increased energy, hyperactivity and confused.   HENT:  Negative for ear pain, hearing loss, tinnitus, nosebleeds, trouble swallowing, hoarse voice, mouth sores, sore throat, ear discharge, tooth pain, gum tenderness, taste disturbance, smell disturbance, hearing aid, bleeding gums, dry mouth, sinus pain, sinus congestion and neck mass.    Eyes:  Negative for double vision, pain, redness, eye pain, decreased vision, eye watering, eye bulging, eye dryness, flashing lights, spots, floaters, strabismus, tunnel vision, jaundice and eye irritation.   Respiratory:   Negative for cough, hemoptysis, sputum production, shortness of breath, wheezing, sleep disturbances  due to breathing, snores loudly, respiratory pain, dyspnea on exertion, cough disturbing sleep and postural dyspnea.    Cardiovascular:  Negative for chest pain, dyspnea on exertion, palpitations, orthopnea, claudication, leg swelling, fingers/toes turn blue, hypertension, hypotension, syncope, history of heart murmur, chest pain on exertion, chest pain at rest, pacemaker, few scattered varicosities, leg pain, sleep disturbances due to breathing, tachycardia, light-headedness, exercise intolerance and edema.   Gastrointestinal:  Negative for heartburn, nausea, vomiting, abdominal pain, diarrhea, constipation, blood in stool, melena, rectal pain, bloating, hemorrhoids, bowel incontinence, jaundice, rectal bleeding, coffee ground emesis and change in stool.   Genitourinary:  Negative for bladder incontinence, dysuria, urgency, hematuria, flank pain, vaginal discharge, difficulty urinating, genital sores, dyspareunia, decreased libido, nocturia, voiding less frequently, arousal difficulty, abnormal vaginal bleeding, excessive menstruation, menstrual changes, hot flashes, vaginal dryness and postmenopausal bleeding.   Musculoskeletal:  Negative for myalgias, back pain, joint swelling, arthralgias, stiffness, muscle cramps, neck pain, bone pain, muscle weakness and fracture.   Skin:  Negative for nail changes, itching, poor wound healing, rash, hair changes, skin changes, acne, warts, poor wound healing, scarring, flaky skin, Raynaud's phenomenon, sensitivity to sunlight and skin thickening.   Neurological:  Negative for dizziness, tingling, tremors, speech change, seizures, loss of consciousness, weakness, light-headedness, numbness, headaches, disturbances in coordination, extremity numbness, memory loss, difficulty walking and paralysis.   Endo/Heme:  Negative for anemia, swollen glands and bruises/bleeds easily.   Psychiatric/Behavioral:  Negative for depression, hallucinations, memory loss, decreased concentration,  mood swings and panic attacks.    Breast:  Negative for breast discharge, breast mass, breast pain and nipple retraction.   Endocrine:  Negative for altered temperature regulation, polyphagia, polydipsia, unwanted hair growth and change in facial hair.        Past Medical History:    Past Medical History:   Diagnosis Date     Cervix cancer (H) 4/2013    neuroendocrine         Past Surgical History:    Past Surgical History:   Procedure Laterality Date     BIOPSY       EXAM UNDER ANESTHESIA, INSERT JOESPH SLEEVE, UTERINE PLACEMENT OF TANDEM AND RING FOR RAD, ULTRASOUND  6/24/2013    Procedure: EXAM UNDER ANESTHESIA, INSERT JOESPH SLEEVE, UTERINE PLACEMENT OF TANDEM AND RING FOR RADIATION, ULTRASOUND GUIDED;  Pelvic Exam, Insert JOESPH Sleeve with Ultrasound Guidance and Place Tandem Ring for High Dose Radiation;  Surgeon: Roxy Brar MD;  Location: [facility] OR     LAPAROSCOPIC SALPINGO-OOPHORECTOMY  5/13/2013    Procedure: LAPAROSCOPIC SALPINGO-OOPHORECTOMY;  Laparoscopy, Left  salpingectomy,Ovarian Transposition, Right Salpingo Oophorectomy , Anesthesia General with block;  Surgeon: Deanna Salinas MD;  Location: [facility] OR     wisdom teeth           Health Maintenance Due   Topic Date Due     TETANUS IMMUNIZATION (SYSTEM ASSIGNED)  10/25/2003     DEPRESSION ACTION PLAN Q1 YR  10/25/2003     HIV SCREEN (SYSTEM ASSIGNED)  10/25/2003     INFLUENZA VACCINE (1) 09/01/2018       Current Medications:     Current Outpatient Prescriptions   Medication Sig Dispense Refill     ALPRAZolam (XANAX) 0.25 MG tablet Take 1 tablet (0.25 mg) by mouth 3 times daily as needed for anxiety 10 tablet 0     gabapentin (NEURONTIN) 300 MG capsule Take 2 capsules (600 mg) by mouth 3 times daily (Patient taking differently: Take 600 mg by mouth daily ) 540 capsule 1     norgestrel-ethinyl estradiol (LO/OVRAL) 0.3-30 MG-MCG per tablet Take 1 tablet by mouth daily 28 tablet 11     iohexol (OMNIPAQUE) 140 MG/ML SOLN solution Mix entire bottle (50ml) of  "contast with 600ml (20 ounces) of water and drink half 2 hrs prior to CT scan and half 1 hr prior to scan (Patient not taking: Reported on 9/13/2018) 140 mL 0         Allergies:      No Known Allergies     Social History:     Social History   Substance Use Topics     Smoking status: Never Smoker     Smokeless tobacco: Never Used     Alcohol use 0.0 oz/week     0 Standard drinks or equivalent per week      Comment: 3 per week       History   Drug Use No         Family History:         Family History   Problem Relation Age of Onset     Cancer Maternal Grandfather      leukemia     Breast Cancer No family hx of      Cancer - colorectal No family hx of          Physical Exam:     /87 (BP Location: Right arm, Patient Position: Chair, Cuff Size: Adult Regular)  Pulse 81  Temp 98.2  F (36.8  C) (Oral)  Resp 14  Ht 1.753 m (5' 9.02\")  Wt 69.5 kg (153 lb 3.2 oz)  SpO2 99%  BMI 22.61 kg/m2  Body mass index is 22.61 kg/(m^2).    General Appearance: healthy and alert, no distress     HEENT: no thyromegaly, no palpable nodules or masses        Cardiovascular: regular rate and rhythm, no gallops, rubs or murmurs     Respiratory: lungs clear, no rales, rhonchi or wheezes, normal diaphragmatic excursion    Musculoskeletal: extremities non tender and without edema    Skin: no lesions or rashes     Neurological: normal gait, no gross defects     Psychiatric: appropriate mood and affect                               Hematological: normal cervical, supraclavicular and inguinal lymph nodes     Gastrointestinal:       abdomen soft, non-tender, non-distended, no organomegaly or masses    Genitourinary: External genitalia and urethral meatus appears normal.  Vagina is smooth without nodularity or masses, foreshortened.  Cervix not visualized.  Exam limited given patient discomfort/guarding and foreshortening. Bimanual exam reveal no masses, nodularity or fullness.  Recto-vaginal exam confirms these findings. Pap collected. 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 She is here today for a surveillance visit, annual pap, and CT review.    20 minutes were spent with this patient, over 50% of that time was spent in symptom management, treatment planning and in counseling and coordination of care.      Plan:     1.)        She is now 5 years out from treatment completion and can extend her surveillance to annually. She will continue to have an annual pap (no HPV); this can be done here or with her local provider; she prefers to be seen here. Will speak to Dr. Salinas regarding the role of any imaging now that she is 5 years out. Recent CT discussed and remains VALERIO. Reviewed signs and symptoms for when she should contact the clinic or seek additional care. Patient to contact the clinic with any questions or concerns. Discussed using the dilator prior to intercourse as well as using the dilator or a vibrator on a regular basis to maintain the vaginal length she currently has.      2.) Genetic risk factors were assessed and the patient does not meet the qualifications for a referral.      3.) Labs and/or tests ordered include:  Pap.      4.) Health maintenance issues addressed today include annual health maintenance and non-gynecologic issues with PCP.    5.)        She verbalized understanding of the above.     AMOL Whittaker, WHNP-BC, ANP-BC  Women's Health Nurse Practitioner  Adult Nurse Pracitioner  Division of Gynecologic Oncology          CC  Patient Care Team:  Saritha Rodney MD as PCP - General (Family Practice)  Shima Babcock MD as Referring Physician (Neurology)  Pancho Villareal MD as MD (Neurology)

## 2018-09-13 NOTE — NURSING NOTE
"Oncology Rooming Note    September 13, 2018 10:36 AM   Erika Ziegler is a 32 year old female who presents for:    Chief Complaint   Patient presents with     Oncology Clinic Visit     [facility] RETURN- MALIG NEOPLASM EXOCERVIX     Initial Vitals: /87 (BP Location: Right arm, Patient Position: Chair, Cuff Size: Adult Regular)  Pulse 81  Temp 98.2  F (36.8  C) (Oral)  Resp 14  Ht 1.753 m (5' 9.02\")  Wt 69.5 kg (153 lb 3.2 oz)  SpO2 99%  BMI 22.61 kg/m2 Estimated body mass index is 22.61 kg/(m^2) as calculated from the following:    Height as of this encounter: 1.753 m (5' 9.02\").    Weight as of this encounter: 69.5 kg (153 lb 3.2 oz). Body surface area is 1.84 meters squared.  No Pain (0) Comment: Data Unavailable   No LMP recorded. Patient is not currently having periods (Reason: UNKNOWN).  Allergies reviewed: Yes  Medications reviewed: Yes    Medications: Medication refills not needed today.  Pharmacy name entered into miLibr[employer]:    [id number] [place] - [address] [facility] - [place], [place] - [address] [facility] [place], [place] - [address] [place] [address] [phone]  [place], [place] - [address] [place] [address]    Clinical concerns: No new concerns. Provider was NOT notified.    10 minutes for nursing intake (face to face time)     Pavan Roberts LPN            "

## 2018-09-13 NOTE — MR AVS SNAPSHOT
After Visit Summary   9/13/2018    Erika Ziegler    MRN: [id number]           Patient Information     Date Of Birth          1985        Visit Information        Provider Department      9/13/2018 10:30 AM Cheyanne Burleson APRN CNP [facility]        Today's Diagnoses     MALIG NEOPLASM EXOCERVIX    -  1       Follow-ups after your visit        Follow-up notes from your care team     Return in about 1 year (around 9/13/2019), or if symptoms worsen or fail to improve.      Who to contact     If you have questions or need follow up information about today's clinic visit or your schedule please contact [facility] directly at [phone].  Normal or non-critical lab and imaging results will be communicated to you by Kiraxhart, letter or phone within 4 business days after the clinic has received the results. If you do not hear from us within 7 days, please contact the clinic through Kiraxhart or phone. If you have a critical or abnormal lab result, we will notify you by phone as soon as possible.  Submit refill requests through [employer] or call your pharmacy and they will forward the refill request to us. Please allow 3 business days for your refill to be completed.          Additional Information About Your Visit        MyChart Information     [employer] gives you secure access to your electronic health record. If you see a primary care provider, you can also send messages to your care team and make appointments. If you have questions, please call your primary care clinic.  If you do not have a primary care provider, please call [phone] and they will assist you.        Care EveryWhere ID     This is your Care EveryWhere ID. This could be used by other organizations to access your [place] medical records  [id number]        Your Vitals Were     Pulse Temperature Respirations Height Pulse Oximetry BMI (Body Mass Index)    81 98.2  F (36.8  C) (Oral) 14  "1.753 m (5' 9.02\") 99% 22.61 kg/m2       Blood Pressure from Last 3 Encounters:   09/13/18 137/87   02/20/18 135/83   10/05/17 129/80    Weight from Last 3 Encounters:   09/13/18 69.5 kg (153 lb 3.2 oz)   02/20/18 70.8 kg (156 lb 1.6 oz)   10/05/17 69.9 kg (154 lb)              We Performed the Following     Pap imaged thin layer diagnostic only          Today's Medication Changes          These changes are accurate as of 9/13/18 11:07 AM.  If you have any questions, ask your nurse or doctor.               These medicines have changed or have updated prescriptions.        Dose/Directions    gabapentin 300 MG capsule   Commonly known as:  NEURONTIN   This may have changed:  when to take this   Used for:  Chemotherapy-induced neuropathy (H)        Dose:  600 mg   Take 2 capsules (600 mg) by mouth 3 times daily   Quantity:  540 capsule   Refills:  1                Primary Care Provider Office Phone # Fax #    Saritha Rodney [phone]       [address] [place] [address]        Equal Access to Services     [facility]: Hadaudi Burgos, mihaela schultz, carol hagan, malu huertas . So [facility] [phone].    ATENCIÓN: Si habla español, tiene a woodward disposición servicios gratuitos de asistencia lingüística. [facility] [phone].    We comply with applicable federal civil rights laws and [place] laws. We do not discriminate on the basis of race, color, national origin, age, disability, sex, sexual orientation, or gender identity.            Thank you!     Thank you for choosing [facility]  for your care. Our goal is always to provide you with excellent care. Hearing back from our patients is one way we can continue to improve our services. Please take a few minutes to complete the written survey that you may receive in the mail after your visit with us. Thank you!             Your Updated Medication List - Protect " others around you: Learn how to safely use, store and throw away your medicines at www.disposemymeds.org.          This list is accurate as of 9/13/18 11:07 AM.  Always use your most recent med list.                   Brand Name Dispense Instructions for use Diagnosis    ALPRAZolam 0.25 MG tablet    XANAX    10 tablet    Take 1 tablet (0.25 mg) by mouth 3 times daily as needed for anxiety    Anxiety       gabapentin 300 MG capsule    NEURONTIN    540 capsule    Take 2 capsules (600 mg) by mouth 3 times daily    Chemotherapy-induced neuropathy (H)       iohexol 140 MG/ML Soln solution    OMNIPAQUE    140 mL    Mix entire bottle (50ml) of contast with 600ml (20 ounces) of water and drink half 2 hrs prior to CT scan and half 1 hr prior to scan    Malignant neoplasm of cervix, unspecified site (H)       norgestrel-ethinyl estradiol 0.3-30 MG-MCG per tablet    LO/OVRAL    28 tablet    Take 1 tablet by mouth daily    Malignant neoplasm of cervix, unspecified site (H)

## 2018-09-17 LAB
COPATH REPORT: ABNORMAL
PAP: ABNORMAL

## 2018-10-09 ASSESSMENT — ANXIETY QUESTIONNAIRES
7. FEELING AFRAID AS IF SOMETHING AWFUL MIGHT HAPPEN: NOT AT ALL
5. BEING SO RESTLESS THAT IT IS HARD TO SIT STILL: NOT AT ALL
3. WORRYING TOO MUCH ABOUT DIFFERENT THINGS: NOT AT ALL
2. NOT BEING ABLE TO STOP OR CONTROL WORRYING: NOT AT ALL
1. FEELING NERVOUS, ANXIOUS, OR ON EDGE: NOT AT ALL
GAD7 TOTAL SCORE: 0
4. TROUBLE RELAXING: NOT AT ALL
GAD7 TOTAL SCORE: 0
6. BECOMING EASILY ANNOYED OR IRRITABLE: NOT AT ALL
7. FEELING AFRAID AS IF SOMETHING AWFUL MIGHT HAPPEN: NOT AT ALL

## 2018-10-10 ASSESSMENT — ANXIETY QUESTIONNAIRES: GAD7 TOTAL SCORE: 0

## 2018-10-17 ENCOUNTER — OFFICE VISIT (OUTPATIENT)
Dept: FAMILY MEDICINE | Facility: CLINIC | Age: 33
End: 2018-10-17
Attending: FAMILY MEDICINE
Payer: COMMERCIAL

## 2018-10-17 ENCOUNTER — MYC MEDICAL ADVICE (OUTPATIENT)
Dept: ONCOLOGY | Facility: CLINIC | Age: 33
End: 2018-10-17

## 2018-10-17 VITALS
HEART RATE: 80 BPM | HEIGHT: 69 IN | DIASTOLIC BLOOD PRESSURE: 85 MMHG | WEIGHT: 153.7 LBS | SYSTOLIC BLOOD PRESSURE: 131 MMHG | BODY MASS INDEX: 22.77 KG/M2

## 2018-10-17 DIAGNOSIS — Z13.220 LIPID SCREENING: ICD-10-CM

## 2018-10-17 DIAGNOSIS — R68.82 LOW LIBIDO: ICD-10-CM

## 2018-10-17 DIAGNOSIS — Z13.21 ENCOUNTER FOR VITAMIN DEFICIENCY SCREENING: ICD-10-CM

## 2018-10-17 DIAGNOSIS — Z23 NEED FOR VACCINATION: Primary | ICD-10-CM

## 2018-10-17 DIAGNOSIS — N94.10 DYSPAREUNIA IN FEMALE: ICD-10-CM

## 2018-10-17 PROCEDURE — 90686 IIV4 VACC NO PRSV 0.5 ML IM: CPT | Mod: ZF

## 2018-10-17 PROCEDURE — G0463 HOSPITAL OUTPT CLINIC VISIT: HCPCS | Mod: 25

## 2018-10-17 PROCEDURE — G0008 ADMIN INFLUENZA VIRUS VAC: HCPCS | Mod: ZF

## 2018-10-17 PROCEDURE — 25000128 H RX IP 250 OP 636: Mod: ZF

## 2018-10-17 NOTE — PROGRESS NOTES
"Pt. Here for CPE, establish care    1. 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 Doing well and followed by Oncology. Last pap/surveillance 9/2018. Moved L ovary to field from radiation, removed fallopian tubes, R ovary removed. Has uterus, but would not be able to get pregnant.   2. 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 Chemo \"brain\"--forgetfullness of names, retention from reading, difficulty of focusing  5. Gyne--See organs above. Prior to cancer: regular menses, had not had pregnancy yet. Was told uterus could not manage pregnancy due to radiation exposure but still needed to do OCP's for hormone replacement. On Lo/ovral, but does not get menses. Was on OCP before and during cancer treatment, and then  never stopped. Currently no hot flashes/night sweats. No vaginal dryness, or other symptoms. No urinary symptoms.  6. Sexual health--Currently decreased sexual thoughts and feelings, less desire to act on them. + subjective arousal. + objective nongenital arousal, +objective gential arousal, needs lubricant. 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 Did home dilators post radiation, , but not as often as should. Mentally/emotionally, associated intercourse with pain, still present. A little current pain with intercourse due to loss depth. +orgasm.   9. Bone yudith --little dairy. Does Vit D in winter--2000? Units   activity--walking daily, 30 min. No fhx osteoporosis  10. HCM--flu vaccine done; dtaP 2008--due; Had HPV vaccine; glucose nl 2015; due for lipids      PMH  Cancer as above  No other hospitalizations or surgeries    FHx  Family History   Problem Relation Age of Onset     Cancer Maternal Grandfather      leukemia     Other Cancer Maternal Grandfather      Grandpa had leukemia in his 80's     Unknown/Adopted Other      We have an adopted son, Jeremiah     " Substance Abuse Father      My Dad has now been sober for over 40 years     Breast Cancer No family hx of      Cancer - colorectal No family hx of      Mother--well  Father--well  Sibs--well  Mgfather--leukemia  pgfather--etoh    SH  Social History     Social History     Marital status:      Spouse name: N/A     Number of children: N/A     Years of education: N/A     Occupational History     Mental Health therapist      Social History Main Topics     Smoking status: Never Smoker     Smokeless tobacco: Never Used      Comment: Never been a smoker     Alcohol use 0.0 oz/week      Comment: On occasion     Drug use: No     Sexual activity: Yes     Partners: Male     Birth control/ protection: Pill     Other Topics Concern     Not on file     Social History Narrative    How much exercise per week? 3 days    How much calcium per day? 1500 mg       How much caffeine per day? 1 cup soffee    How much vitamin D per day? 1000 iu    Do you/your family wear seatbelts?  Yes    Do you/your family use safety helmets? Yes    Do you/your family use sunscreen? Yes    Do you/your family keep firearms in the home? No    Do you/your family have a smoke detector(s)? Yes        Do you feel safe in your home? Yes    Has anyone ever touched you in an unwanted manner? No     Explain         January 27, 2015 Alexandra Wheat LPN                 Little meat, mainly chicken fish  Activity as above  Adopted son    Work--[place] [employer]  ETOH--2/week, 2/sitting    ROS  Answers for HPI/ROS submitted by the patient on 10/9/2018   VIRGILIO 7 TOTAL SCORE: 0  PHQ-2 Score: 0  General Symptoms: No  Skin Symptoms: No  HENT Symptoms: No  EYE SYMPTOMS: No  HEART SYMPTOMS: No  LUNG SYMPTOMS: No  INTESTINAL SYMPTOMS: No  URINARY SYMPTOMS: No  GYNECOLOGIC SYMPTOMS: No  BREAST SYMPTOMS: No  SKELETAL SYMPTOMS: No  BLOOD SYMPTOMS: No  NERVOUS SYSTEM SYMPTOMS: No  MENTAL HEALTH SYMPTOMS: No  Reviewed with patient    PE  Blood pressure 131/85, pulse  "80, height 1.753 m (5' 9\"), weight 69.7 kg (153 lb 11.2 oz), not currently breastfeeding.    Constitutional: Well appearing woman in no acute distress.   Psychological: appropriate mood.  Eyes: anicteric, normal extra-ocular movements,  pupils are equal and reactive to light.   Ears, Nose and Throat: tympanic membranes clear, nose clear and free of lesions, throat clear, moist mucous membrames, neck supple with full range of motion.    Neck: No thyroidmegaly. No jugular venous distension, no carotid bruits.  Cardiovascular: regular rate and rhythm, normal S1 and S2, no murmurs, rubs or gallops, peripheral pulses full and symmetric   Respiratory: clear to auscultation, no wheezes or crackles, normal breath sounds.  Gastrointestinal: positive bowel sounds, nontender, no hepatosplenomegaly, no masses. No guarding or rebound.    Lymphatic: no cervical lymphadenopathy.  Musculoskeletal: full range of motion, no edema and motor strength is equal in the upper and lower extremities    Skin: no concerning lesions, no jaundice.  Neurological: cranial nerves intact, normal strength and sensation, reflexes at patella and biceps normal, normal gait, no tremor.   Monofilament Foot Exam: n/a  A/P  1. HCM  Immunizations--  Need: dTap, flu vacine  Cancer screening--Up to date  CV risk- Need: Lipid panel   Bone Health--Need:  Vitamin D level due to little dairy, check if adequate replacement    2. Low libido, ovarian failure-- OCP's may be contributing factor, no need for contraception. Unclear if premature ovarian failure due to therapy for cervical cancer, if so can do estradiol and intermittent prometrium for HRT rather than OCP's. Will do trial off OCP's and see if ovarian function returns.   X 6 months If hot flashes/night sweats, other symptoms intolerable, will start 1-2 mg oral estrace, with prometrium x 7 days every 3 months  Check estradiol, FSH, LH in 3-6 months  3. Dyspareunia--to schedule sexual medicine follow-up in 3 " months, consider pelvic PT    Follow-up 3 months

## 2018-10-17 NOTE — NURSING NOTE
"Injectable Influenza Immunization Documentation    1.  Has the patient received the information for the injectable influenza vaccine? YES     2. Is the patient 6 months of age or older? YES     3. Does the patient have any of the following contraindications?         Severe allergy to eggs?  No     Severe allergic reaction to previous influenza vaccines?  No   Severe allergy to latex?  No       History of Guillain-[place] syndrome?  No     Currently have a temperature greater than 100.4F?  No        4.  Severely egg allergic patients should have flu vaccine eligibility assessed by an MD, RN, or pharmacist, and those who received flu vaccine should be observed for 15 min by an MD, RN, Pharmacist, Medical Technician, or member of clinic staff.\": YES    5. Latex-allergic patients should be given latex-free influenza vaccine Yes. Please reference the Vaccine latex table to determine if your clinic s product is latex-containing.       Vaccination given by Keyla Panchal          "

## 2018-10-17 NOTE — LETTER
"10/17/2018       RE: Erika Ziegler  [address]  [place] [address]     Dear Colleague,    Thank you for referring your patient, Erika Ziegler, to the [facility] at [facility]. Please see a copy of my visit note below.    Pt. Here for CPE, establish care    1. 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 Doing well and followed by Oncology. Last pap/surveillance 9/2018. Moved L ovary to field from radiation, removed fallopian tubes, R ovary removed. Has uterus, but would not be able to get pregnant.   2. 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 Chemo \"brain\"--forgetfullness of names, retention from reading, difficulty of focusing  5. Gyne--See organs above. Prior to cancer: regular menses, had not had pregnancy yet. Was told uterus could not manage pregnancy due to radiation exposure but still needed to do OCP's for hormone replacement. On Lo/ovral, but does not get menses. Was on OCP before and during cancer treatment, and then  never stopped. Currently no hot flashes/night sweats. No vaginal dryness, or other symptoms. No urinary symptoms.  6. Sexual health--Currently decreased sexual thoughts and feelings, less desire to act on them. + subjective arousal. + objective nongenital arousal, +objective gential arousal, needs lubricant. 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 Did home dilators post radiation, , but not as often as should. Mentally/emotionally, associated intercourse with pain, still present. A little current pain with intercourse due to loss depth. +orgasm.   9. Bone yudith --little dairy. Does Vit D in winter--2000? Units   activity--walking daily, 30 min. No fhx osteoporosis  10. HCM--flu vaccine done; dtaP 2008--due; Had HPV vaccine; glucose nl 2015; due for " "lipids      PMH  Cancer as above  No other hospitalizations or surgeries    FHx  Family History   Problem Relation Age of Onset     Cancer Maternal Grandfather      leukemia     Other Cancer Maternal Grandfather      Grandpa had leukemia in his 80's     Unknown/Adopted Other      We have an adopted son, Jeremiah     Substance Abuse Father      My Dad has now been sober for over 40 years     Breast Cancer No family hx of      Cancer - colorectal No family hx of      Mother--well  Father--well  Sibs--well  Mgfather--leukemia  pgfather--etoh    SH  Social History     Social History     Marital status:      Spouse name: N/A     Number of children: N/A     Years of education: N/A     Occupational History     Mental Health therapist      Social History Main Topics     Smoking status: Never Smoker     Smokeless tobacco: Never Used      Comment: Never been a smoker     Alcohol use 0.0 oz/week      Comment: On occasion     Drug use: No     Sexual activity: Yes     Partners: Male     Birth control/ protection: Pill     Other Topics Concern     Not on file     Social History Narrative    How much exercise per week? 3 days    How much calcium per day? 1500 mg       How much caffeine per day? 1 cup soffee    How much vitamin D per day? 1000 iu    Do you/your family wear seatbelts?  Yes    Do you/your family use safety helmets? Yes    Do you/your family use sunscreen? Yes    Do you/your family keep firearms in the home? No    Do you/your family have a smoke detector(s)? Yes        Do you feel safe in your home? Yes    Has anyone ever touched you in an unwanted manner? No     Explain         January 27, 2015 Alexandra Wheat LPN                 Little meat, mainly chicken fish  Activity as above  Adopted son    Work--[place] [employer]  ETOH--2/week, 2/sitting    Reviewed with patient    PE  Blood pressure 131/85, pulse 80, height 1.753 m (5' 9\"), weight 69.7 kg (153 lb 11.2 oz), not currently " breastfeeding.    Constitutional: Well appearing woman in no acute distress.   Psychological: appropriate mood.  Eyes: anicteric, normal extra-ocular movements,  pupils are equal and reactive to light.   Ears, Nose and Throat: tympanic membranes clear, nose clear and free of lesions, throat clear, moist mucous membrames, neck supple with full range of motion.    Neck: No thyroidmegaly. No jugular venous distension, no carotid bruits.  Cardiovascular: regular rate and rhythm, normal S1 and S2, no murmurs, rubs or gallops, peripheral pulses full and symmetric   Respiratory: clear to auscultation, no wheezes or crackles, normal breath sounds.  Gastrointestinal: positive bowel sounds, nontender, no hepatosplenomegaly, no masses. No guarding or rebound.    Lymphatic: no cervical lymphadenopathy.  Musculoskeletal: full range of motion, no edema and motor strength is equal in the upper and lower extremities    Skin: no concerning lesions, no jaundice.  Neurological: cranial nerves intact, normal strength and sensation, reflexes at patella and biceps normal, normal gait, no tremor.   Monofilament Foot Exam: n/a  A/P  1. HCM  Immunizations--  Need: dTap, flu vacine  Cancer screening--Up to date  CV risk- Need: Lipid panel   Bone Health--Need:  Vitamin D level due to little dairy, check if adequate replacement    2. Low libido, ovarian failure-- OCP's may be contributing factor, no need for contraception. Unclear if premature ovarian failure due to therapy for cervical cancer, if so can do estradiol and intermittent prometrium for HRT rather than OCP's. Will do trial off OCP's and see if ovarian function returns.   X 6 months If hot flashes/night sweats, other symptoms intolerable, will start 1-2 mg oral estrace, with prometrium x 7 days every 3 months  Check estradiol, FSH, LH in 3-6 months  3. Dyspareunia--to schedule sexual medicine follow-up in 3 months, consider pelvic PT    Follow-up 3 months    Again, thank you for  allowing me to participate in the care of your patient.    Sincerely,  Ajit Dexter MD

## 2018-10-17 NOTE — MR AVS SNAPSHOT
After Visit Summary   10/17/2018    Erika Ziegler    MRN: [id number]           Patient Information     Date Of Birth          1985        Visit Information        Provider Department      10/17/2018 9:00 AM Ajit Dexter MD [facility]        Today's Diagnoses     Need for vaccination    -  1    Lipid screening        Encounter for vitamin deficiency screening        Low libido        Dyspareunia in female           Follow-ups after your visit        Follow-up notes from your care team     Return in about 3 months (around 1/17/2019).      Your next 10 appointments already scheduled     Oct 01, 2019 10:00 AM CDT   (Arrive by 9:45 AM)   Return Visit with AMOL Tomlinson [facility] ([facility])    [address]  [address] [place] [id number] [phone]              Who to contact     Please call your clinic at [phone] to:    Ask questions about your health    Make or cancel appointments    Discuss your medicines    Learn about your test results    Speak to your doctor            Additional Information About Your Visit        MyChart Information     [employer] gives you secure access to your electronic health record. If you see a primary care provider, you can also send messages to your care team and make appointments. If you have questions, please call your primary care clinic.  If you do not have a primary care provider, please call [phone] and they will assist you.      [employer] is an electronic gateway that provides easy, online access to your medical records. With [employer], you can request a clinic appointment, read your test results, renew a prescription or communicate with your care team.     To access your existing account, please contact your [facility] or call [phone] for assistance.        Care EveryWhere ID     This is your Care EveryWhere  "ID. This could be used by other organizations to access your [place] medical records  [id number]        Your Vitals Were     Pulse Height BMI (Body Mass Index)             80 1.753 m (5' 9\") 22.7 kg/m2          Blood Pressure from Last 3 Encounters:   10/17/18 131/85   09/13/18 137/87   02/20/18 135/83    Weight from Last 3 Encounters:   10/17/18 69.7 kg (153 lb 11.2 oz)   09/13/18 69.5 kg (153 lb 3.2 oz)   02/20/18 70.8 kg (156 lb 1.6 oz)              We Performed the Following     HC FLU VAC PRESRV FREE QUAD SPLIT VIR 3+YRS IM          Today's Medication Changes          These changes are accurate as of 10/17/18 11:59 PM.  If you have any questions, ask your nurse or doctor.               These medicines have changed or have updated prescriptions.        Dose/Directions    gabapentin 300 MG capsule   Commonly known as:  NEURONTIN   This may have changed:  when to take this   Used for:  Chemotherapy-induced neuropathy (H)        Dose:  600 mg   Take 2 capsules (600 mg) by mouth 3 times daily   Quantity:  540 capsule   Refills:  1                Primary Care Provider Office Phone # Fax #    Saritha Rodney [phone]       [address] [place] [address]        Equal Access to Services     BERNADETTE MAYER AH: Pal pierre Sodenise, waaxda luqadaha, qaybta kaalmada bentley, malu cuevas. So [facility] [place] [phone].    ATENCIÓN: Si habla español, tiene a woodward disposición servicios gratuitos de asistencia lingüística. Jonathaname al [phone].    We comply with applicable federal civil rights laws and [place] laws. We do not discriminate on the basis of race, color, national origin, age, disability, sex, sexual orientation, or gender identity.            Thank you!     Thank you for choosing [facility]  for your care. Our goal is always to provide you with excellent care. Hearing back from our patients is one way we can continue to " improve our services. Please take a few minutes to complete the written survey that you may receive in the mail after your visit with us. Thank you!             Your Updated Medication List - Protect others around you: Learn how to safely use, store and throw away your medicines at www.disposemymeds.org.          This list is accurate as of 10/17/18 11:59 PM.  Always use your most recent med list.                   Brand Name Dispense Instructions for use Diagnosis    gabapentin 300 MG capsule    NEURONTIN    540 capsule    Take 2 capsules (600 mg) by mouth 3 times daily    Chemotherapy-induced neuropathy (H)       norgestrel-ethinyl estradiol 0.3-30 MG-MCG per tablet    LO/OVRAL    28 tablet    Take 1 tablet by mouth daily    Malignant neoplasm of cervix, unspecified site (H)

## 2018-10-18 NOTE — TELEPHONE ENCOUNTER
Called patient and answered all of her questions to the best of my ability. Dr. Salinas also aware of questions and does not know of any blood tests/markers.     AMOL Whittaker, WHNP-BC, ANP-BC  Women's Health Nurse Practitioner  Adult Nurse Pracitioner  Division of Gynecologic Oncology

## 2018-10-24 PROBLEM — N94.10 DYSPAREUNIA IN FEMALE: Status: ACTIVE | Noted: 2018-10-24

## 2018-10-24 PROBLEM — R68.82 LOW LIBIDO: Status: ACTIVE | Noted: 2018-10-24

## 2018-11-08 DIAGNOSIS — Z13.220 LIPID SCREENING: ICD-10-CM

## 2018-11-08 DIAGNOSIS — Z13.21 ENCOUNTER FOR VITAMIN DEFICIENCY SCREENING: ICD-10-CM

## 2018-11-08 LAB
CHOLEST SERPL-MCNC: 174 MG/DL
HDLC SERPL-MCNC: 77 MG/DL
LDLC SERPL CALC-MCNC: 82 MG/DL
NONHDLC SERPL-MCNC: 96 MG/DL
TRIGL SERPL-MCNC: 72 MG/DL

## 2018-11-12 LAB
DEPRECATED CALCIDIOL+CALCIFEROL SERPL-MC: <40 UG/L (ref 20–75)
VITAMIN D2 SERPL-MCNC: <5 UG/L
VITAMIN D3 SERPL-MCNC: 35 UG/L

## 2018-11-13 NOTE — PROGRESS NOTES
Dear Erika,   Here are your recent results which are within the expected range. Please continue with your current plan of care.       Ajit Dexter MD

## 2018-11-21 ENCOUNTER — MYC MEDICAL ADVICE (OUTPATIENT)
Dept: FAMILY MEDICINE | Facility: CLINIC | Age: 33
End: 2018-11-21

## 2018-11-23 ENCOUNTER — MYC MEDICAL ADVICE (OUTPATIENT)
Dept: FAMILY MEDICINE | Facility: CLINIC | Age: 33
End: 2018-11-23

## 2018-11-23 DIAGNOSIS — N91.2 AMENORRHEA: Primary | ICD-10-CM

## 2018-11-29 DIAGNOSIS — N91.2 AMENORRHEA: ICD-10-CM

## 2018-11-29 LAB
FSH SERPL-ACNC: 87.7 IU/L
LH SERPL-ACNC: 35.6 IU/L

## 2018-11-29 PROCEDURE — 83001 ASSAY OF GONADOTROPIN (FSH): CPT | Performed by: FAMILY MEDICINE

## 2018-11-29 PROCEDURE — 83002 ASSAY OF GONADOTROPIN (LH): CPT | Performed by: FAMILY MEDICINE

## 2018-11-29 PROCEDURE — 36415 COLL VENOUS BLD VENIPUNCTURE: CPT | Performed by: FAMILY MEDICINE

## 2018-11-29 PROCEDURE — 82670 ASSAY OF TOTAL ESTRADIOL: CPT | Performed by: FAMILY MEDICINE

## 2018-12-04 LAB — ESTRADIOL SERPL HS-MCNC: 4 PG/ML

## 2018-12-07 NOTE — PROGRESS NOTES
Dear Erika,   Here are your recent results. Your results are consistent with postmenopausal levels of ovarian function (very low levels of estrogen, despite signals from the brain/pituitary to produce more. I think at this point, you can try using either birth control pills or regular hormone replacement therapy. Let me know what you would like to do.     Ajit Dexter MD

## 2019-03-08 ENCOUNTER — MYC MEDICAL ADVICE (OUTPATIENT)
Dept: ONCOLOGY | Facility: CLINIC | Age: 34
End: 2019-03-08

## 2019-03-08 NOTE — TELEPHONE ENCOUNTER
RN discussed patient symptoms on MyChart.     Patient denies any new food/diet, stress or changes in routine.     Patient denies any other concerning symptoms.     Patient and RN discussed all her questions and concerns.     Patient was offered an appointment but would like to wait to see if symptoms improve.     Patient was appreciative of the call and has no other questions or concerns.     Adrienne Reyes RN

## 2019-03-29 DIAGNOSIS — C53.9 MALIGNANT NEOPLASM OF CERVIX, UNSPECIFIED SITE (H): ICD-10-CM

## 2019-03-29 NOTE — TELEPHONE ENCOUNTER
patient calling for refill of elinist(OCP) for the patient  Last visit with MD 9/13/18   Next visit 10/1/19  Last order date    norgestrel-ethinyl estradiol (LO/OVRAL) 0.3-30 MG-MCG per tablet 28 tablet 11 6/18/2018  No   Sig - Route: Take 1 tablet by mouth daily - Oral       Please advise on refills for patient  /

## 2019-04-01 RX ORDER — NORGESTREL AND ETHINYL ESTRADIOL 0.3-0.03MG
KIT ORAL
Qty: 28 TABLET | Refills: 11 | Status: SHIPPED | OUTPATIENT
Start: 2019-04-01 | End: 2019-12-04

## 2019-06-21 ENCOUNTER — MYC REFILL (OUTPATIENT)
Dept: ONCOLOGY | Facility: CLINIC | Age: 34
End: 2019-06-21

## 2019-06-21 DIAGNOSIS — C53.9 MALIGNANT NEOPLASM OF CERVIX, UNSPECIFIED SITE (H): ICD-10-CM

## 2019-06-24 NOTE — TELEPHONE ENCOUNTER
patient calling for refill of OCP's for the patient  Last visit with MD 9/13/19  Last order date   ELINEST 0.3-30 MG-MCG tablet 28 tablet 11 4/1/2019  No   Sig: TAKE ONE TABLET BY MOUTH EVERY DAY   Sent to pharmacy as: ELINEST 0.3-30 MG-MCG tablet     Please advise on refills for patient

## 2019-08-26 ASSESSMENT — ENCOUNTER SYMPTOMS
BLOATING: 1
HEARTBURN: 0
RECTAL PAIN: 0
CONSTIPATION: 0
JAUNDICE: 0
BLOOD IN STOOL: 0
VOMITING: 0
NAUSEA: 0
ABDOMINAL PAIN: 0
DIARRHEA: 0
BOWEL INCONTINENCE: 0

## 2019-09-03 ENCOUNTER — ONCOLOGY VISIT (OUTPATIENT)
Dept: ONCOLOGY | Facility: CLINIC | Age: 34
End: 2019-09-03
Attending: NURSE PRACTITIONER
Payer: COMMERCIAL

## 2019-09-03 VITALS
WEIGHT: 152 LBS | SYSTOLIC BLOOD PRESSURE: 136 MMHG | HEART RATE: 90 BPM | RESPIRATION RATE: 14 BRPM | OXYGEN SATURATION: 97 % | HEIGHT: 69 IN | TEMPERATURE: 98.1 F | BODY MASS INDEX: 22.51 KG/M2 | DIASTOLIC BLOOD PRESSURE: 85 MMHG

## 2019-09-03 DIAGNOSIS — C53.9 MALIGNANT NEOPLASM OF CERVIX, UNSPECIFIED SITE (H): Primary | ICD-10-CM

## 2019-09-03 PROCEDURE — 99214 OFFICE O/P EST MOD 30 MIN: CPT | Mod: ZP | Performed by: NURSE PRACTITIONER

## 2019-09-03 PROCEDURE — 88175 CYTOPATH C/V AUTO FLUID REDO: CPT | Performed by: NURSE PRACTITIONER

## 2019-09-03 PROCEDURE — G0463 HOSPITAL OUTPT CLINIC VISIT: HCPCS | Mod: ZF

## 2019-09-03 ASSESSMENT — ENCOUNTER SYMPTOMS
DYSURIA: 0
EXERCISE INTOLERANCE: 0
HYPOTENSION: 0
SHORTNESS OF BREATH: 0
DEPRESSION: 0
NIGHT SWEATS: 0
LOSS OF CONSCIOUSNESS: 0
FATIGUE: 0
BACK PAIN: 0
EYE IRRITATION: 0
SKIN CHANGES: 0
WEIGHT LOSS: 0
LEG PAIN: 0
JAUNDICE: 0
HEADACHES: 0
HEARTBURN: 0
NAIL CHANGES: 0
EYE REDNESS: 0
DECREASED APPETITE: 0
MEMORY LOSS: 0
SNORES LOUDLY: 0
DIFFICULTY URINATING: 0
EXTREMITY NUMBNESS: 0
SPEECH CHANGE: 0
HEMOPTYSIS: 0
EYE WATERING: 0
SWOLLEN GLANDS: 0
CONSTIPATION: 0
HEMATURIA: 0
NECK MASS: 0
COUGH DISTURBING SLEEP: 0
INSOMNIA: 0
TROUBLE SWALLOWING: 0
MUSCLE CRAMPS: 0
HYPERTENSION: 0
POLYPHAGIA: 0
NUMBNESS: 0
NECK PAIN: 0
WEAKNESS: 0
POSTURAL DYSPNEA: 0
CLAUDICATION: 0
LIGHT-HEADEDNESS: 0
MUSCLE WEAKNESS: 0
DIARRHEA: 0
ARTHRALGIAS: 0
RESPIRATORY PAIN: 0
MYALGIAS: 0
DYSPNEA ON EXERTION: 0
BLOOD IN STOOL: 0
RECTAL PAIN: 0
BLOATING: 1
FEVER: 0
SPUTUM PRODUCTION: 0
SORE THROAT: 0
PALPITATIONS: 0
HOARSE VOICE: 0
CHILLS: 0
NAUSEA: 0
WHEEZING: 0
ABDOMINAL PAIN: 0
VOMITING: 0
EYE PAIN: 0
POOR WOUND HEALING: 0
SINUS PAIN: 0
TINGLING: 0
STIFFNESS: 0
BREAST MASS: 0
SYNCOPE: 0
SMELL DISTURBANCE: 0
NERVOUS/ANXIOUS: 0
DECREASED CONCENTRATION: 0
TREMORS: 0
JOINT SWELLING: 0
DECREASED LIBIDO: 0
INCREASED ENERGY: 0
POLYDIPSIA: 0
SEIZURES: 0
COUGH: 0
DOUBLE VISION: 0
ORTHOPNEA: 0
BOWEL INCONTINENCE: 0
TASTE DISTURBANCE: 0
DIZZINESS: 0
TACHYCARDIA: 0
HALLUCINATIONS: 0
BREAST PAIN: 0
WEIGHT GAIN: 0
FLANK PAIN: 0
DISTURBANCES IN COORDINATION: 0
PARALYSIS: 0
SINUS CONGESTION: 0
HOT FLASHES: 0
ALTERED TEMPERATURE REGULATION: 0
SLEEP DISTURBANCES DUE TO BREATHING: 0
BRUISES/BLEEDS EASILY: 0
PANIC: 0
LEG SWELLING: 0

## 2019-09-03 ASSESSMENT — PAIN SCALES - GENERAL: PAINLEVEL: NO PAIN (0)

## 2019-09-03 ASSESSMENT — MIFFLIN-ST. JEOR: SCORE: 1459.1

## 2019-09-03 NOTE — NURSING NOTE
"Oncology Rooming Note    September 3, 2019 1:29 PM   Erika Ziegler is a 33 year old female who presents for:    Chief Complaint   Patient presents with     Oncology Clinic Visit     Return Visit for MALIG NEOPLASM EXOCERVIX      Initial Vitals: /85 (BP Location: Right arm, Patient Position: Chair, Cuff Size: Adult Regular)   Pulse 90   Temp 98.1  F (36.7  C) (Oral)   Resp 14   Ht 1.753 m (5' 9.02\")   Wt 68.9 kg (152 lb)   LMP 09/03/2013   SpO2 97%   Breastfeeding? No   BMI 22.44 kg/m   Estimated body mass index is 22.44 kg/m  as calculated from the following:    Height as of this encounter: 1.753 m (5' 9.02\").    Weight as of this encounter: 68.9 kg (152 lb). Body surface area is 1.83 meters squared.  No Pain (0) Comment: Data Unavailable   Patient's last menstrual period was 09/03/2013.  Allergies reviewed: Yes  Medications reviewed: Yes    Medications: Medication refills not needed today.  Pharmacy name entered into [employer]:    [id number] - [place], [place] - [address] [facility] [place], [place] - [address] [facility] [place], [place] - [address] [place] [address] [phone]  [facility] [place] [facility] [address] [place] [address]    Clinical concerns: No questions or concerns to escalate.   Cheyanne was notified.      Georgia Saldivar, RN, MSN              "

## 2019-09-03 NOTE — LETTER
9/3/2019       RE: Erika Ziegler  [address]  [facility] [address]     Dear Colleague,    Thank you for referring your patient, Erika Ziegler, to the [facility]. Please see a copy of my visit note below.                Follow Up Notes on Referred Patient    Date: 9/3/2019       Dr. Saritha Rodney, MD  [address]  [place] [address]       RE: Erika Ziegler  : 1985  HUMBERTO: 9/3/2019    Dear Dr. 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 She is here today for a surveillance visit and annual pap.      Course to date:   Erika had some post coital bleeding and was seen by Dr. Silva for her annual visit. On pelvic examination she was noted to have a friable cervical mass for which a pap smear was obtained. She then underwent a colposcopy with biopsies taken with above diagnosis made. She would like to have children in the future and the current plan is to preserve her ovaries and undergo oocyte and/embryo preservation with surrogate carrier.   Pap/colpo history:   5/10/4: NIL   05: LSIL   3/2006 colpo with mild dysplasia   06: LSIL pap   07: ASCUS +HPV   07: LSIL   2008: colpo with mild dysplasia   08 & 2009: LSIL   2009: ANDRZEJ I-II   3/2010: colpo ANDRZEJ I   11/5/10, 11, 12: NIL   13: ASC-H, JERRI   2013: colpo with poorly differentiated carcinoma with neuroendocrine differentiation and partial tumor necrosis   13: PET CT with 6.5 x 5.2 cm cervical mass; tiny focus of increased metabolism in the right midpelvis laterally. The ureter at this level is difficult to follow. This may represent some activity in the distal ureter through it is difficult to completely exclude activity in a small non  enlarged pelvic lymph node. Chest lear, no other evidence of mets.   5/1/13: Met with reproductive endocrinology to discuss fertility options. Per their recommendation, given the cervical cancer diagnosis and the size of the lesion, the patient is not a suitable candidate for transvaginal oocyte retrieval. Transabdominal ultrasound was performed in addition to transvaginal ultrasound, to evaluate for possible transabdominal oocyte retrieval, with ovaries positioned low in the pelvis precluding safe transabdominal oocyte retrieval. Discussed option of ovarian tissue cryopreservation and ovarian translocation prior to radiation therapy.   5/1/13: MR PELVIS IMPRESSION:  1. 5.1 x 3.1 x 5.9 cm enhancing cervical mass extending of the posterior aspect of the cervix not involving the vagina or uterus no evidence of parametrial spread.  2. Two small nonspecific pelvic lymph nodes, 1.0 x 0.6 cm left pelvic lymph node series 6 image 6, 0.6 x 1.0 cm right pelvic lymph node on image 6.  5/13/13: laparoscopy, left ovarian transposition, right salpingo oophorectomy (reproductive medicine taking right ovary after surgery)   5/20/13-6/17/13: Cycle #1-3 Etoposide/Cisplatin; began EBRT   6/24/13: Insertion of High Dose Rate Tandem and Ring for Iridium-192 implant. Pt tolerated well.   7/9/13: Completed brachytherapy  8/2/13-8/21/13: Cycle #1-2 Taxol/Carbo  9/11/13 PET/CT IMPRESSION:  1. No evidence of FDG avid malignancy in the neck, chest, abdomen, or pelvis.  2. Inflammatory changes in the presacral and perirectal spaces, likely post radiation change.  9/13/13-10/4/13: Cycle #3-4 Taxol/Carbo  12/16/13: PET/CT IMPRESSION:  1. No evidence of hypermetabolic activity within the neck, chest, abdomen, or pelvis to suggest active or metastatic disease.  2. Persistent inflammatory changes within the low pelvis, most compatible with posttreatment change, without associated hypermetabolic activity to suggest local recurrence.  12/18/13:  "recovering relatively well from treatment. She still has neuropathy in her feet. It is constant, annoying tingling and numbness in her toes. She has some relief with gabapentin and B6/L-glutamine. She also still has some \"digestive upset\" since finishing radiation therapy, has diarrhea especially in the morning. She also had some chest discomfort last week, but this has since resolved and she attributes it to anxiety after meeting a patient with similar cancer to hers that has metastasized to lungs. She also continues to have some bleeding and discomfort with use of vaginal dilator. She still takes OCPs and has NOT been skipping sugar pill week. She does not feel she experiences any menopausal symptoms on these off weeks but has also not been monitoring it closely. Amenorrhea still.   3/17/14: CT C/A/P IMPRESSION: No CT scan findings in the chest, abdomen, or pelvis to indicate recurrent or metastatic tumor. Unchanged mild free fluid in the pelvis.  3/19/14 pap NIL  6/10/14: CT C/A/P Impression:   1. No evidence of recurrent or metastatic cervical cancer in the chest, abdomen, or pelvis.   2. New subtle wall thickening of the small bowel loops in the low abdomen, likely sequelae of prior radiation.   3. Stable nonspecific pulmonary nodules measuring up to 3 mm since at least 9/11/2013. Continued followup recommended until 12 month stability is documented. No new pulmonary nodule.   6/11/14: Pap NIL.  9/2/14: Pap NIL, HPV negative. CT cap showed: No evidence of recurrence or metastatic lesion in chest, abdomen, or pelvis. Stable sub-4 mm pulmonary nodules.  12/2/14: Pap LSIL, HPV negative. CT cap showed: Impression:   1. No evidence of recurrent local or metastatic disease in the chest, abdomen, or pelvis.  2. Stable sub-4 mm pulmonary nodule on the left. No new pulmonary nodules.  2/17/15: CT C/A/P: IMPRESSION:   1. Left lower lobe 2 mm nodule unchanged since initial imaging on 9/11/2013.  2. No evidence of " recurrent local or metastatic disease in the chest, abdomen, or pelvis.  3/5/15: Pap ASC-H, LSIL also present, HPV 18 postive.    4/4/15: ED visit for 4 days of abdominal pain, increasing in severity with increased frequency of urination and bowel movements over the past 2 days as well. She also complains of abdominal distention. Gyn onc consult in hospital did not recommend CT at that time unless symptoms worsen. Discharged same day.   XRay abdomen: Nonobstructive bowel gas pattern. No free intraperitoneal air.      4/16/15: Colpo done. No aceto-white changes identified. No biopsies done. Plan to repeat pap in June as well as CT.       6/9/15: CT cap verbal preliminary report by Dr. Eric is no change in pulmonary nodules and no evidence of recurrence/metastatic disease. Pap pending.       Addendum: CT cap IMPRESSION:   1. Indeterminate 12 mm hypodensity within the uterine fundus may represent fluid within the endometrial canal secondary to cervical stenosis. Underlying uterine lesion not excluded. Initially, a dedicated pelvic ultrasound is recommended for further assessment.  2. No evidence of metastatic disease.  3. 2 mm nodules in the left lung are unchanged since at least 9/11/2013, and are statistically benign.       Plan for U/S for further evaluation of uterus.      7/2/15: U/S results pending. Verbal report per Dr. Guajardo shows a solid fibroid like area which is not fluid; recommend f/u with additional imaging.       U/S final IMPRESSION:   1. Relatively well-defined small mixed echogenicity myometrial mass in the uterine fundus. Fibroid could have this appearance, however given patient's history of cervical malignancy and radiation, consider a 3-6 month followup to ensure stability.  2. Ovaries are not identified.  3. Trace free fluid in the pelvis.      9/1/15: CT cap IMPRESSION:    1. No evidence of metastatic disease in the chest, abdomen, or pelvis.  2. Stable uterine fundus hypodensity most  consistent with submucosal fibroid as described on pelvic ultrasound 7/2/2015. However, given the patient's history of cervical cancer, short-term followup ultrasound in 3-6 months is recommended.  3. Postsurgical changes of right salpingo-oophorectomy and left  Salpingectomy.      9/1/15: pap NIL, neg HPV.       12/4/15: pelvic ultrasound FINDINGS:  The uterus measures 5.3 x 3.4 x 2.0 cm. The endometrium is within normal limits and measures 2.0 mm. There is no free fluid in the pelvis. Redemonstration of a heterogeneous appearing circumscribed submucosal mass in the uterine fundus measuring 10 x 8 x 6 mm, previously 8 x 13 x 9 mm. Unchanged calcification in the lower uterine segment. No new masses are identified. The right ovary is surgically absent. The left ovary was not visualized. No adnexal masses.  No focal abnormality of the visualized portions of the bladder.  IMPRESSION:    Mild decrease in the submucosal mass in the uterine fundus from 7/2/2015, which most likely represents a benign fibroid. No other suspicious masses are identified.      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       6//2016: Pap NIL. 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      12/6/16: CT cap IMPRESSION:   1. In this patient with history of cervical cancer there is no evidence of recurrent or metastatic disease in the chest, abdomen and pelvis.   2. Tiny pulmonary nodules are unchanged since 9/11/2013.      5/18/17: Pap NIL.  8/14/17: CT cap IMPRESSION: No evidence of recurrent or metastatic disease in the chest, abdomen and pelvis  2/20/18: CT cap IMPRESSION: No evidence of recurrent or metastatic disease  in the chest, abdomen or pelvis.  8/13/18: CT cap IMPRESSION: In this patient with history of neuroendocrine carcinoma of the cervix status post chemotherapy and radiation therapy, there is no evidence of recurrence or metastatic disease in the chest, abdomen, or pelvis.     Per Dr. Salinas, does not need annual imaging.      9/13/18: Pap ASCUS.  9/3/19: Pap pending.           Today she comes to clinic feeling generally well. She continues to have some alternating bowels since her radiation; she denies ongoing diarrhea. She denies any vaginal bleeding, no changes in her bowel or bladder habits, no nausea/emesis, no lower extremity edema, and no difficulties eating or sleeping. She denies any abdominal discomfort/bloating, no fevers or chills, and no chest pain or shortness of breath. She continues to take her OCP and reports that on her week off she has noticed symptoms of hot flashes/sweating. She reports that IC has become less uncomfortable and she is having it more often; she never really used her dilator very often. She does have some intermittent right hip discomfort to which she believes she needs to start doing more exercise again. She does not drink milk but does eat other dairy. Her and her  adopted a second child (a girl, Zafar) a month ago and are very much enjoying their family time.           Review of Systems     Constitutional:  Negative for fever, chills, weight loss, weight gain, fatigue, decreased appetite, night sweats, recent stressors, height gain, height loss, post-operative complications, incisional pain, hallucinations, increased energy, hyperactivity and confused.   HENT:  Negative for ear pain, hearing loss, tinnitus, nosebleeds, trouble swallowing, hoarse voice, mouth sores, sore throat, ear discharge, tooth pain, gum tenderness, taste disturbance, smell disturbance, hearing aid, bleeding gums, dry mouth, sinus pain, sinus congestion and neck mass.    Eyes:  Negative for double  vision, pain, redness, eye pain, decreased vision, eye watering, eye bulging, eye dryness, flashing lights, spots, floaters, strabismus, tunnel vision, jaundice and eye irritation.   Respiratory:   Negative for cough, hemoptysis, sputum production, shortness of breath, wheezing, sleep disturbances due to breathing, snores loudly, respiratory pain, dyspnea on exertion, cough disturbing sleep and postural dyspnea.    Cardiovascular:  Negative for chest pain, dyspnea on exertion, palpitations, orthopnea, claudication, leg swelling, fingers/toes turn blue, hypertension, hypotension, syncope, history of heart murmur, chest pain on exertion, chest pain at rest, pacemaker, few scattered varicosities, leg pain, sleep disturbances due to breathing, tachycardia, light-headedness, exercise intolerance and edema.   Gastrointestinal:  Positive for bloating and change in stool. Negative for heartburn, nausea, vomiting, abdominal pain, diarrhea, constipation, blood in stool, melena, rectal pain, bowel incontinence, jaundice and coffee ground emesis.   Genitourinary:  Negative for bladder incontinence, dysuria, urgency, hematuria, flank pain, vaginal discharge, difficulty urinating, genital sores, dyspareunia, decreased libido, nocturia, voiding less frequently, arousal difficulty, abnormal vaginal bleeding, excessive menstruation, menstrual changes, hot flashes, vaginal dryness and postmenopausal bleeding.   Musculoskeletal:  Negative for myalgias, back pain, joint swelling, arthralgias, stiffness, muscle cramps, neck pain, bone pain, muscle weakness and fracture.   Skin:  Negative for nail changes, itching, poor wound healing, rash, hair changes, skin changes, acne, warts, poor wound healing, scarring, flaky skin, Raynaud's phenomenon, sensitivity to sunlight and skin thickening.   Neurological:  Negative for dizziness, tingling, tremors, speech change, seizures, loss of consciousness, weakness, light-headedness, numbness,  headaches, disturbances in coordination, extremity numbness, memory loss, difficulty walking and paralysis.   Endo/Heme:  Negative for anemia, swollen glands and bruises/bleeds easily.   Psychiatric/Behavioral:  Negative for depression, hallucinations, memory loss, decreased concentration, mood swings and panic attacks.    Breast:  Negative for breast discharge, breast mass, breast pain and nipple retraction.   Endocrine:  Negative for altered temperature regulation, polyphagia, polydipsia, unwanted hair growth and change in facial hair.          Past Medical History:    Past Medical History:   Diagnosis Date     Abnormal Pap smear 4/13     Cervix cancer (H) 4/2013    neuroendocrine     Hx of previous reproductive problem 5/13    Due to cancer treatment         Past Surgical History:    Past Surgical History:   Procedure Laterality Date     BIOPSY       EXAM UNDER ANESTHESIA, INSERT JOESPH SLEEVE, UTERINE PLACEMENT OF TANDEM AND RING FOR RAD, ULTRASOUND  6/24/2013    Procedure: EXAM UNDER ANESTHESIA, INSERT JOESPH SLEEVE, UTERINE PLACEMENT OF TANDEM AND RING FOR RADIATION, ULTRASOUND GUIDED;  Pelvic Exam, Insert JOESPH Sleeve with Ultrasound Guidance and Place Tandem Ring for High Dose Radiation;  Surgeon: Roxy Brar MD;  Location:  OR     LAPAROSCOPIC SALPINGO-OOPHORECTOMY  5/13/2013    Procedure: LAPAROSCOPIC SALPINGO-OOPHORECTOMY;  Laparoscopy, Left  salpingectomy,Ovarian Transposition, Right Salpingo Oophorectomy , Anesthesia General with block;  Surgeon: Deanna Salinas MD;  Location:  OR     [facility]           Health Maintenance Due   Topic Date Due     DEPRESSION ACTION PLAN  1985     HIV SCREENING  10/25/2000     DTAP/TDAP/TD IMMUNIZATION (1 - Tdap) 10/25/2010     PHQ-9  10/25/2018     INFLUENZA VACCINE (1) 09/01/2019       Current Medications:     Current Outpatient Medications   Medication Sig Dispense Refill     ELINEST 0.3-30 MG-MCG tablet TAKE ONE TABLET BY MOUTH EVERY DAY 28 tablet  "11         Allergies:      No Known Allergies     Social History:     Social History     Tobacco Use     Smoking status: Never Smoker     Smokeless tobacco: Never Used     Tobacco comment: Never been a smoker   Substance Use Topics     Alcohol use: Yes     Alcohol/week: 0.0 oz     Comment: On occasion       History   Drug Use No         Family History:       Family History   Problem Relation Age of Onset     Cancer Maternal Grandfather         leukemia     Other Cancer Maternal Grandfather         Grandpa had leukemia in his 80's     Unknown/Adopted Other         We have an adopted son, Jeremiah     Substance Abuse Father         My Dad has now been sober for over 40 years     Breast Cancer No family hx of      Cancer - colorectal No family hx of          Physical Exam:     /85 (BP Location: Right arm, Patient Position: Chair, Cuff Size: Adult Regular)   Pulse 90   Temp 98.1  F (36.7  C) (Oral)   Resp 14   Ht 1.753 m (5' 9.02\")   Wt 68.9 kg (152 lb)   LMP 09/03/2013   SpO2 97%   Breastfeeding? No   BMI 22.44 kg/m     Body mass index is 22.44 kg/m .    General Appearance: healthy and alert, no distress     HEENT: no thyromegaly, no palpable nodules or masses        Cardiovascular: regular rate and rhythm, no gallops, rubs or murmurs     Respiratory: lungs clear, no rales, rhonchi or wheezes, normal diaphragmatic excursion    Musculoskeletal: extremities non tender and without edema    Skin: no lesions or rashes     Neurological: normal gait, no gross defects     Psychiatric: appropriate mood and affect                               Hematological: normal cervical, supraclavicular and inguinal lymph nodes     Gastrointestinal:       abdomen soft, non-tender, non-distended, no organomegaly or masses    Genitourinary: External genitalia and urethral meatus appears normal.  Vagina is smooth without nodularity or masses; foreshortened.  Cervix appears flush with vaginal wall; radiation changes; scant bleeding " with cytobrush.  Bimanual exam reveal no masses, nodularity or fullness.  Recto-vaginal exam confirms these findings. Pap collected.       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 She is here today for a surveillance visit and annual pap.    15 minutes were spent with this patient, over 50% of that time was spent in symptom management, treatment planning and in counseling and coordination of care.      Plan:     1.)        Continue with annual exam with pap (no HPV); today's pap was collected. Reviewed signs and symptoms for when she should contact the clinic or seek additional care. Patient to contact the clinic with any questions or concerns in the interim. Discussed monitoring her hip discomfort; imaging a year ago was VALERIO; discussed having a DEXA sooner than 65 due to her radiation. Discussed she can skip her placebo week on her OCPs every other month.      2.) Genetic risk factors were assessed and the patient does not meet the qualifications for a referral.      3.) Labs and/or tests ordered include:  Pap.      4.) Health maintenance issues addressed today include annual health maintenance and non-gynecologic issues with PCP.    AMOL Whittaker, WHNP-BC, ANP-BC  Women's Health Nurse Practitioner  Adult Nurse Pracitioner  Division of Gynecologic Oncology          CC  Patient Care Team:  Saritha Rodney MD as PCP - General (Family Practice)  Shima Babcock MD as Referring Physician (Neurology)  Pancho Villareal MD as MD (Neurology)  Ajit Dexter MD as MD (Family Practice)

## 2019-09-03 NOTE — PROGRESS NOTES
Follow Up Notes on Referred Patient    Date: 9/3/2019       Dr. Saritha Rodney, MD  [address] [address]       RE: Erika Ziegler  : 1985  HUMBERTO: 9/3/2019    Dear Dr. 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 She is here today for a surveillance visit and annual pap.      Course to date:   Erika had some post coital bleeding and was seen by Dr. Silva for her annual visit. On pelvic examination she was noted to have a friable cervical mass for which a pap smear was obtained. She then underwent a colposcopy with biopsies taken with above diagnosis made. She would like to have children in the future and the current plan is to preserve her ovaries and undergo oocyte and/embryo preservation with surrogate carrier.   Pap/colpo history:   5/10/4: NIL   05: LSIL   3/2006 colpo with mild dysplasia   06: LSIL pap   07: ASCUS +HPV   07: LSIL   2008: colpo with mild dysplasia   08 & 2009: LSIL   2009: ANDRZEJ I-II   3/2010: colpo ANDRZEJ I   11/5/10, 11, 12: NIL   13: ASC-H, JERRI   2013: colpo with poorly differentiated carcinoma with neuroendocrine differentiation and partial tumor necrosis   13: PET CT with 6.5 x 5.2 cm cervical mass; tiny focus of increased metabolism in the right midpelvis laterally. The ureter at this level is difficult to follow. This may represent some activity in the distal ureter through it is difficult to completely exclude activity in a small non enlarged pelvic lymph node. Chest lear, no other evidence of mets.   13: Met with reproductive endocrinology to discuss fertility options. Per their recommendation, given the cervical cancer diagnosis and the size of the lesion, the patient  "is not a suitable candidate for transvaginal oocyte retrieval. Transabdominal ultrasound was performed in addition to transvaginal ultrasound, to evaluate for possible transabdominal oocyte retrieval, with ovaries positioned low in the pelvis precluding safe transabdominal oocyte retrieval. Discussed option of ovarian tissue cryopreservation and ovarian translocation prior to radiation therapy.   5/1/13: MR PELVIS IMPRESSION:  1. 5.1 x 3.1 x 5.9 cm enhancing cervical mass extending of the posterior aspect of the cervix not involving the vagina or uterus no evidence of parametrial spread.  2. Two small nonspecific pelvic lymph nodes, 1.0 x 0.6 cm left pelvic lymph node series 6 image 6, 0.6 x 1.0 cm right pelvic lymph node on image 6.  5/13/13: laparoscopy, left ovarian transposition, right salpingo oophorectomy (reproductive medicine taking right ovary after surgery)   5/20/13-6/17/13: Cycle #1-3 Etoposide/Cisplatin; began EBRT   6/24/13: Insertion of High Dose Rate Tandem and Ring for Iridium-192 implant. Pt tolerated well.   7/9/13: Completed brachytherapy  8/2/13-8/21/13: Cycle #1-2 Taxol/Carbo  9/11/13 PET/CT IMPRESSION:  1. No evidence of FDG avid malignancy in the neck, chest, abdomen, or pelvis.  2. Inflammatory changes in the presacral and perirectal spaces, likely post radiation change.  9/13/13-10/4/13: Cycle #3-4 Taxol/Carbo  12/16/13: PET/CT IMPRESSION:  1. No evidence of hypermetabolic activity within the neck, chest, abdomen, or pelvis to suggest active or metastatic disease.  2. Persistent inflammatory changes within the low pelvis, most compatible with posttreatment change, without associated hypermetabolic activity to suggest local recurrence.  12/18/13: recovering relatively well from treatment. She still has neuropathy in her feet. It is constant, annoying tingling and numbness in her toes. She has some relief with gabapentin and B6/L-glutamine. She also still has some \"digestive upset\" since " finishing radiation therapy, has diarrhea especially in the morning. She also had some chest discomfort last week, but this has since resolved and she attributes it to anxiety after meeting a patient with similar cancer to hers that has metastasized to lungs. She also continues to have some bleeding and discomfort with use of vaginal dilator. She still takes OCPs and has NOT been skipping sugar pill week. She does not feel she experiences any menopausal symptoms on these off weeks but has also not been monitoring it closely. Amenorrhea still.   3/17/14: CT C/A/P IMPRESSION: No CT scan findings in the chest, abdomen, or pelvis to indicate recurrent or metastatic tumor. Unchanged mild free fluid in the pelvis.  3/19/14 pap NIL  6/10/14: CT C/A/P Impression:   1. No evidence of recurrent or metastatic cervical cancer in the chest, abdomen, or pelvis.   2. New subtle wall thickening of the small bowel loops in the low abdomen, likely sequelae of prior radiation.   3. Stable nonspecific pulmonary nodules measuring up to 3 mm since at least 9/11/2013. Continued followup recommended until 12 month stability is documented. No new pulmonary nodule.   6/11/14: Pap NIL.  9/2/14: Pap NIL, HPV negative. CT cap showed: No evidence of recurrence or metastatic lesion in chest, abdomen, or pelvis. Stable sub-4 mm pulmonary nodules.  12/2/14: Pap LSIL, HPV negative. CT cap showed: Impression:   1. No evidence of recurrent local or metastatic disease in the chest, abdomen, or pelvis.  2. Stable sub-4 mm pulmonary nodule on the left. No new pulmonary nodules.  2/17/15: CT C/A/P: IMPRESSION:   1. Left lower lobe 2 mm nodule unchanged since initial imaging on 9/11/2013.  2. No evidence of recurrent local or metastatic disease in the chest, abdomen, or pelvis.  3/5/15: Pap ASC-H, LSIL also present, HPV 18 postive.    4/4/15: ED visit for 4 days of abdominal pain, increasing in severity with increased frequency of urination and bowel  movements over the past 2 days as well. She also complains of abdominal distention. Gyn onc consult in hospital did not recommend CT at that time unless symptoms worsen. Discharged same day.   XRay abdomen: Nonobstructive bowel gas pattern. No free intraperitoneal air.      4/16/15: Colpo done. No aceto-white changes identified. No biopsies done. Plan to repeat pap in June as well as CT.       6/9/15: CT cap verbal preliminary report by Dr. Eric is no change in pulmonary nodules and no evidence of recurrence/metastatic disease. Pap pending.       Addendum: CT cap IMPRESSION:   1. Indeterminate 12 mm hypodensity within the uterine fundus may represent fluid within the endometrial canal secondary to cervical stenosis. Underlying uterine lesion not excluded. Initially, a dedicated pelvic ultrasound is recommended for further assessment.  2. No evidence of metastatic disease.  3. 2 mm nodules in the left lung are unchanged since at least 9/11/2013, and are statistically benign.       Plan for U/S for further evaluation of uterus.      7/2/15: U/S results pending. Verbal report per Dr. Guajardo shows a solid fibroid like area which is not fluid; recommend f/u with additional imaging.       U/S final IMPRESSION:   1. Relatively well-defined small mixed echogenicity myometrial mass in the uterine fundus. Fibroid could have this appearance, however given patient's history of cervical malignancy and radiation, consider a 3-6 month followup to ensure stability.  2. Ovaries are not identified.  3. Trace free fluid in the pelvis.      9/1/15: CT cap IMPRESSION:    1. No evidence of metastatic disease in the chest, abdomen, or pelvis.  2. Stable uterine fundus hypodensity most consistent with submucosal fibroid as described on pelvic ultrasound 7/2/2015. However, given the patient's history of cervical cancer, short-term followup ultrasound in 3-6 months is recommended.  3. Postsurgical changes of right salpingo-oophorectomy  and left  Salpingectomy.      9/1/15: pap NIL, neg HPV.       12/4/15: pelvic ultrasound FINDINGS:  The uterus measures 5.3 x 3.4 x 2.0 cm. The endometrium is within normal limits and measures 2.0 mm. There is no free fluid in the pelvis. Redemonstration of a heterogeneous appearing circumscribed submucosal mass in the uterine fundus measuring 10 x 8 x 6 mm, previously 8 x 13 x 9 mm. Unchanged calcification in the lower uterine segment. No new masses are identified. The right ovary is surgically absent. The left ovary was not visualized. No adnexal masses.  No focal abnormality of the visualized portions of the bladder.  IMPRESSION:    Mild decrease in the submucosal mass in the uterine fundus from 7/2/2015, which most likely represents a benign fibroid. No other suspicious masses are identified.      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       6//2016: Pap NIL. 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      12/6/16: CT cap IMPRESSION:   1. In this patient with history of cervical cancer there is no evidence of recurrent or metastatic disease in the chest, abdomen and pelvis.   2. Tiny pulmonary nodules are unchanged since 9/11/2013.      5/18/17: Pap NIL.  8/14/17: CT cap IMPRESSION: No evidence of recurrent or metastatic disease in the chest, abdomen and pelvis  2/20/18: CT cap IMPRESSION: No evidence of recurrent or metastatic disease in the chest, abdomen or pelvis.  8/13/18: CT cap IMPRESSION: In this patient with history of neuroendocrine carcinoma of the cervix status post chemotherapy and radiation therapy, there is no evidence of recurrence or metastatic disease in the chest,  abdomen, or pelvis.     Per Dr. Salinas, does not need annual imaging.      9/13/18: Pap ASCUS.  9/3/19: Pap pending.           Today she comes to clinic feeling generally well. She continues to have some alternating bowels since her radiation; she denies ongoing diarrhea. She denies any vaginal bleeding, no changes in her bowel or bladder habits, no nausea/emesis, no lower extremity edema, and no difficulties eating or sleeping. She denies any abdominal discomfort/bloating, no fevers or chills, and no chest pain or shortness of breath. She continues to take her OCP and reports that on her week off she has noticed symptoms of hot flashes/sweating. She reports that IC has become less uncomfortable and she is having it more often; she never really used her dilator very often. She does have some intermittent right hip discomfort to which she believes she needs to start doing more exercise again. She does not drink milk but does eat other dairy. Her and her  adopted a second child (a girl, Zafar) a month ago and are very much enjoying their family time.           Review of Systems     Constitutional:  Negative for fever, chills, weight loss, weight gain, fatigue, decreased appetite, night sweats, recent stressors, height gain, height loss, post-operative complications, incisional pain, hallucinations, increased energy, hyperactivity and confused.   HENT:  Negative for ear pain, hearing loss, tinnitus, nosebleeds, trouble swallowing, hoarse voice, mouth sores, sore throat, ear discharge, tooth pain, gum tenderness, taste disturbance, smell disturbance, hearing aid, bleeding gums, dry mouth, sinus pain, sinus congestion and neck mass.    Eyes:  Negative for double vision, pain, redness, eye pain, decreased vision, eye watering, eye bulging, eye dryness, flashing lights, spots, floaters, strabismus, tunnel vision, jaundice and eye irritation.   Respiratory:   Negative for cough, hemoptysis, sputum production,  shortness of breath, wheezing, sleep disturbances due to breathing, snores loudly, respiratory pain, dyspnea on exertion, cough disturbing sleep and postural dyspnea.    Cardiovascular:  Negative for chest pain, dyspnea on exertion, palpitations, orthopnea, claudication, leg swelling, fingers/toes turn blue, hypertension, hypotension, syncope, history of heart murmur, chest pain on exertion, chest pain at rest, pacemaker, few scattered varicosities, leg pain, sleep disturbances due to breathing, tachycardia, light-headedness, exercise intolerance and edema.   Gastrointestinal:  Positive for bloating and change in stool. Negative for heartburn, nausea, vomiting, abdominal pain, diarrhea, constipation, blood in stool, melena, rectal pain, bowel incontinence, jaundice and coffee ground emesis.   Genitourinary:  Negative for bladder incontinence, dysuria, urgency, hematuria, flank pain, vaginal discharge, difficulty urinating, genital sores, dyspareunia, decreased libido, nocturia, voiding less frequently, arousal difficulty, abnormal vaginal bleeding, excessive menstruation, menstrual changes, hot flashes, vaginal dryness and postmenopausal bleeding.   Musculoskeletal:  Negative for myalgias, back pain, joint swelling, arthralgias, stiffness, muscle cramps, neck pain, bone pain, muscle weakness and fracture.   Skin:  Negative for nail changes, itching, poor wound healing, rash, hair changes, skin changes, acne, warts, poor wound healing, scarring, flaky skin, Raynaud's phenomenon, sensitivity to sunlight and skin thickening.   Neurological:  Negative for dizziness, tingling, tremors, speech change, seizures, loss of consciousness, weakness, light-headedness, numbness, headaches, disturbances in coordination, extremity numbness, memory loss, difficulty walking and paralysis.   Endo/Heme:  Negative for anemia, swollen glands and bruises/bleeds easily.   Psychiatric/Behavioral:  Negative for depression, hallucinations,  memory loss, decreased concentration, mood swings and panic attacks.    Breast:  Negative for breast discharge, breast mass, breast pain and nipple retraction.   Endocrine:  Negative for altered temperature regulation, polyphagia, polydipsia, unwanted hair growth and change in facial hair.          Past Medical History:    Past Medical History:   Diagnosis Date     Abnormal Pap smear 4/13     Cervix cancer (H) 4/2013    neuroendocrine     Hx of previous reproductive problem 5/13    Due to cancer treatment         Past Surgical History:    Past Surgical History:   Procedure Laterality Date     BIOPSY       EXAM UNDER ANESTHESIA, INSERT JOESPH SLEEVE, UTERINE PLACEMENT OF TANDEM AND RING FOR RAD, ULTRASOUND  6/24/2013    Procedure: EXAM UNDER ANESTHESIA, INSERT JOESPH SLEEVE, UTERINE PLACEMENT OF TANDEM AND RING FOR RADIATION, ULTRASOUND GUIDED;  Pelvic Exam, Insert JOESPH Sleeve with Ultrasound Guidance and Place Tandem Ring for High Dose Radiation;  Surgeon: Roxy Brar MD;  Location: [facility] OR     LAPAROSCOPIC SALPINGO-OOPHORECTOMY  5/13/2013    Procedure: LAPAROSCOPIC SALPINGO-OOPHORECTOMY;  Laparoscopy, Left  salpingectomy,Ovarian Transposition, Right Salpingo Oophorectomy , Anesthesia General with block;  Surgeon: Deanna Salinas MD;  Location:  OR     [facility]           Health Maintenance Due   Topic Date Due     DEPRESSION ACTION PLAN  1985     HIV SCREENING  10/25/2000     DTAP/TDAP/TD IMMUNIZATION (1 - Tdap) 10/25/2010     PHQ-9  10/25/2018     INFLUENZA VACCINE (1) 09/01/2019       Current Medications:     Current Outpatient Medications   Medication Sig Dispense Refill     ELINEST 0.3-30 MG-MCG tablet TAKE ONE TABLET BY MOUTH EVERY DAY 28 tablet 11         Allergies:      No Known Allergies     Social History:     Social History     Tobacco Use     Smoking status: Never Smoker     Smokeless tobacco: Never Used     Tobacco comment: Never been a smoker   Substance Use Topics     Alcohol use: Yes  "    Alcohol/week: 0.0 oz     Comment: On occasion       History   Drug Use No         Family History:       Family History   Problem Relation Age of Onset     Cancer Maternal Grandfather         leukemia     Other Cancer Maternal Grandfather         Grandpa had leukemia in his 80's     Unknown/Adopted Other         We have an adopted son, Jeremiah     Substance Abuse Father         My Dad has now been sober for over 40 years     Breast Cancer No family hx of      Cancer - colorectal No family hx of          Physical Exam:     /85 (BP Location: Right arm, Patient Position: Chair, Cuff Size: Adult Regular)   Pulse 90   Temp 98.1  F (36.7  C) (Oral)   Resp 14   Ht 1.753 m (5' 9.02\")   Wt 68.9 kg (152 lb)   LMP 09/03/2013   SpO2 97%   Breastfeeding? No   BMI 22.44 kg/m    Body mass index is 22.44 kg/m .    General Appearance: healthy and alert, no distress     HEENT: no thyromegaly, no palpable nodules or masses        Cardiovascular: regular rate and rhythm, no gallops, rubs or murmurs     Respiratory: lungs clear, no rales, rhonchi or wheezes, normal diaphragmatic excursion    Musculoskeletal: extremities non tender and without edema    Skin: no lesions or rashes     Neurological: normal gait, no gross defects     Psychiatric: appropriate mood and affect                               Hematological: normal cervical, supraclavicular and inguinal lymph nodes     Gastrointestinal:       abdomen soft, non-tender, non-distended, no organomegaly or masses    Genitourinary: External genitalia and urethral meatus appears normal.  Vagina is smooth without nodularity or masses; foreshortened.  Cervix appears flush with vaginal wall; radiation changes; scant bleeding with cytobrush.  Bimanual exam reveal no masses, nodularity or fullness.  Recto-vaginal exam confirms these findings. Pap collected.       Assessment:    Erika Ziegler is a 33 year old woman with a history of poorly differentiated neuroendocrine " carcinoma of the cervix, Stage IB2 s/p three cycles Etoposide/Cisplatin, EBRT, brachytherapy, and four cycles Taxol/Carbo (completed 10/2013). She is here today for a surveillance visit and annual pap.    15 minutes were spent with this patient, over 50% of that time was spent in symptom management, treatment planning and in counseling and coordination of care.      Plan:     1.)        Continue with annual exam with pap (no HPV); today's pap was collected. Reviewed signs and symptoms for when she should contact the clinic or seek additional care. Patient to contact the clinic with any questions or concerns in the interim. Discussed monitoring her hip discomfort; imaging a year ago was [facility]; discussed having a DEXA sooner than 65 due to her radiation. Discussed she can skip her placebo week on her OCPs every other month.      2.) Genetic risk factors were assessed and the patient does not meet the qualifications for a referral.      3.) Labs and/or tests ordered include:  Pap.      4.) Health maintenance issues addressed today include annual health maintenance and non-gynecologic issues with PCP.    AMOL Whittaker, WHNP-BC, ANP-BC  Women's Health Nurse Practitioner  Adult Nurse Pracitioner  Division of Gynecologic Oncology          CC  Patient Care Team:  Tanvi Cullen MD as PCP - General (Family Practice)  Shima Babcock MD as MD (Neurology)  Shima Babcock MD as Referring Physician (Neurology)  Pancho Villareal MD as MD (Neurology)  Ajit Dexter MD as MD (Family Practice)  TANVI CULLEN

## 2019-09-06 LAB
COPATH REPORT: NORMAL
PAP: NORMAL

## 2019-11-22 ASSESSMENT — ANXIETY QUESTIONNAIRES
7. FEELING AFRAID AS IF SOMETHING AWFUL MIGHT HAPPEN: NOT AT ALL
2. NOT BEING ABLE TO STOP OR CONTROL WORRYING: NOT AT ALL
4. TROUBLE RELAXING: NOT AT ALL
GAD7 TOTAL SCORE: 0
GAD7 TOTAL SCORE: 0
5. BEING SO RESTLESS THAT IT IS HARD TO SIT STILL: NOT AT ALL
7. FEELING AFRAID AS IF SOMETHING AWFUL MIGHT HAPPEN: NOT AT ALL
6. BECOMING EASILY ANNOYED OR IRRITABLE: NOT AT ALL
1. FEELING NERVOUS, ANXIOUS, OR ON EDGE: NOT AT ALL
3. WORRYING TOO MUCH ABOUT DIFFERENT THINGS: NOT AT ALL

## 2019-11-23 ASSESSMENT — ANXIETY QUESTIONNAIRES: GAD7 TOTAL SCORE: 0

## 2019-12-04 ENCOUNTER — OFFICE VISIT (OUTPATIENT)
Dept: FAMILY MEDICINE | Facility: CLINIC | Age: 34
End: 2019-12-04
Attending: FAMILY MEDICINE
Payer: COMMERCIAL

## 2019-12-04 VITALS
SYSTOLIC BLOOD PRESSURE: 128 MMHG | BODY MASS INDEX: 22.91 KG/M2 | HEART RATE: 74 BPM | WEIGHT: 154.7 LBS | HEIGHT: 69 IN | DIASTOLIC BLOOD PRESSURE: 84 MMHG

## 2019-12-04 DIAGNOSIS — C53.9 MALIGNANT NEOPLASM OF CERVIX, UNSPECIFIED SITE (H): ICD-10-CM

## 2019-12-04 PROBLEM — R68.82 LOW LIBIDO: Status: RESOLVED | Noted: 2018-10-24 | Resolved: 2019-12-04

## 2019-12-04 PROBLEM — N94.10 DYSPAREUNIA IN FEMALE: Status: RESOLVED | Noted: 2018-10-24 | Resolved: 2019-12-04

## 2019-12-04 PROCEDURE — G0463 HOSPITAL OUTPT CLINIC VISIT: HCPCS | Mod: ZF

## 2019-12-04 PROCEDURE — 90715 TDAP VACCINE 7 YRS/> IM: CPT | Mod: ZF

## 2019-12-04 PROCEDURE — 90471 IMMUNIZATION ADMIN: CPT | Mod: ZF

## 2019-12-04 PROCEDURE — 25000128 H RX IP 250 OP 636: Mod: ZF

## 2019-12-04 ASSESSMENT — ANXIETY QUESTIONNAIRES
5. BEING SO RESTLESS THAT IT IS HARD TO SIT STILL: NOT AT ALL
GAD7 TOTAL SCORE: 0
1. FEELING NERVOUS, ANXIOUS, OR ON EDGE: NOT AT ALL
6. BECOMING EASILY ANNOYED OR IRRITABLE: NOT AT ALL
3. WORRYING TOO MUCH ABOUT DIFFERENT THINGS: NOT AT ALL
7. FEELING AFRAID AS IF SOMETHING AWFUL MIGHT HAPPEN: NOT AT ALL
2. NOT BEING ABLE TO STOP OR CONTROL WORRYING: NOT AT ALL

## 2019-12-04 ASSESSMENT — PATIENT HEALTH QUESTIONNAIRE - PHQ9: 5. POOR APPETITE OR OVEREATING: NOT AT ALL

## 2019-12-04 ASSESSMENT — MIFFLIN-ST. JEOR: SCORE: 1466.09

## 2019-12-04 NOTE — PROGRESS NOTES
"Pt. Here for CPE     1. 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 Doing well and followed by Oncology. Last pap/surveillance 9/2019--normal. Moved L ovary to field from radiation, removed fallopian tubes, R ovary removed. Has uterus, but would not be able to get pregnant.   2. 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  3. Chemo \"brain\"--forgetfullness of names, retention from reading, difficulty of focusing  5. Gyne--See organs above. Prior to cancer: regular menses, had not had pregnancy yet. Was told uterus could not manage pregnancy due to radiation exposure but still needed to do OCP's for hormone replacement. On Lo/ovral, but does not get menses. Was on OCP before and during cancer treatment.   Did trial off OCP's. Had vasomotor symptoms, and now back on them.   Currently no hot flashes/night sweats. No vaginal dryness, or other symptoms. No urinary symptoms.  6. Sexual health--doing well now  . 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 Did home dilators post radiation, no current pain with intercourse.  9. Bone yudith --little dairy. Does Vit D in winter- not yet started.s   activity--walking, caring for 2 small chidlren. No fhx osteoporosis  10. HCM--flu vaccine done; dtaP 2008--due; Had HPV vaccine; glucose nl 2015; lipids 2018 nl        PMH  Cancer as above  No other hospitalizations or surgeries     FHx  Family History          Family History   Problem Relation Age of Onset     Cancer Maternal Grandfather         leukemia     Other Cancer Maternal Grandfather         Grandpa had leukemia in his 80's     Unknown/Adopted Other         We have an adopted son, Jeremiah     Substance Abuse Father         My Dad has now been sober for over 40 years     Breast Cancer No family hx of       Cancer - colorectal No family hx of      "      Mother--well  Father--well  Sibs--well  Mat grandfather--leukemia  Pat grandfather--etoh    No chagnes today     SH  Social History   Social History            Social History     Marital status:        Spouse name: N/A     Number of children: N/A     Years of education: N/A           Occupational History     Mental Health therapist                Social History Main Topics     Smoking status: Never Smoker     Smokeless tobacco: Never Used         Comment: Never been a smoker     Alcohol use 0.0 oz/week          Comment: On occasion     Drug use: No     Sexual activity: Yes       Partners: Male       Birth control/ protection: Pill           Other Topics Concern     Not on file          Social History Narrative     How much exercise per week? 3 days     How much calcium per day? 1500 mg                                           How much caffeine per day? 1 cup soffee     How much vitamin D per day? 1000 iu     Do you/your family wear seatbelts?  Yes     Do you/your family use safety helmets? Yes     Do you/your family use sunscreen? Yes     Do you/your family keep firearms in the home? No     Do you/your family have a smoke detector(s)? Yes           Do you feel safe in your home? Yes     Has anyone ever touched you in an unwanted manner? No                  Explain            January 27, 2015 Alexandra Wheat LPN                           Little meat, mainly chicken fish  Activity as above  Adopted son 3 1/2, and adopted now 4 month old    Work--[employer], full time  ETOH--2/week, 2/sitting       ROS  Answers for HPI/ROS submitted by the patient on 11/22/2019   VIRGILIO 7 TOTAL SCORE: 0  General Symptoms: No  Skin Symptoms: No  HENT Symptoms: No  EYE SYMPTOMS: No  HEART SYMPTOMS: No  LUNG SYMPTOMS: No  INTESTINAL SYMPTOMS: No  URINARY SYMPTOMS: No  GYNECOLOGIC SYMPTOMS: No  BREAST SYMPTOMS: No  SKELETAL SYMPTOMS: No  BLOOD SYMPTOMS: No  NERVOUS SYSTEM SYMPTOMS: No  MENTAL HEALTH SYMPTOMS:  "No  Reviewed with patient.    Exam  Blood pressure 128/84, pulse 74, height 1.753 m (5' 9\"), weight 70.2 kg (154 lb 11.2 oz), not currently breastfeeding.  Constitutional: Well appearing woman in no acute distress.   Psychological: appropriate mood.  Eyes: anicteric, normal extra-ocular movements,  pupils are equal and reactive to light.   Ears, Nose and Throat: tympanic membranes clear, nose clear and free of lesions, throat clear, moist mucous membrames, neck supple with full range of motion.    Neck: No thyroidmegaly. No jugular venous distension, no carotid bruits.  Cardiovascular: regular rate and rhythm, normal S1 and S2, no murmurs, rubs or gallops, peripheral pulses full and symmetric   Respiratory: clear to auscultation, no wheezes or crackles, normal breath sounds.    Gastrointestinal: positive bowel sounds, nontender, no hepatosplenomegaly, no masses. No guarding or rebound.  Lymphatic: no cervical lymphadenopathy.  Musculoskeletal: full range of motion, no edema and motor strength is equal in the upper and lower extremities    Skin: no concerning lesions, no jaundice. Birthmark above L elbow unchanged  Neurological: cranial nerves intact, normal strength and sensation, reflexes at patella and biceps normal, normal gait, no tremor.   Monofilament Foot Exam: n/a    A/P  1. HCM  Immunizations--  Need: dTap      Cancer screening--Up to date  CV risk- Up to date    Bone Health--Up to date  Recommend 1000 units Vitamin D supplement in winter  Continue weight bearing activity, continue OCP for bone density protection    STI/HIV Screening --Up to Date     2. 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

## 2019-12-04 NOTE — LETTER
"12/4/2019       RE: Erika Ziegler  [address]  [place] [address]     Dear Colleague,    Thank you for referring your patient, Erika Ziegler, to the [facility] at [facility]. Please see a copy of my visit note below.    Pt. Here for CPE     1. 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 Doing well and followed by Oncology. Last pap/surveillance 9/2019--normal. Moved L ovary to field from radiation, removed fallopian tubes, R ovary removed. Has uterus, but would not be able to get pregnant.   2. 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  3. Chemo \"brain\"--forgetfullness of names, retention from reading, difficulty of focusing  5. Gyne--See organs above. Prior to cancer: regular menses, had not had pregnancy yet. Was told uterus could not manage pregnancy due to radiation exposure but still needed to do OCP's for hormone replacement. On Lo/ovral, but does not get menses. Was on OCP before and during cancer treatment.   Did trial off OCP's. Had vasomotor symptoms, and now back on them.   Currently no hot flashes/night sweats. No vaginal dryness, or other symptoms. No urinary symptoms.  6. Sexual health--doing well now  . 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 Did home dilators post radiation, no current pain with intercourse.  9. Bone yudith --little dairy. Does Vit D in winter- not yet started.s   activity--walking , caring for 2 small chidlren. No fhx osteoporosis  10. HCM--flu vaccine done; dtaP 2008--due; Had HPV vaccine; glucose nl 2015; lipids 2018 nl        PMH  Cancer as above  No other hospitalizations or surgeries     FHx  Family History          Family History   Problem Relation Age of Onset     Cancer Maternal Grandfather         leukemia     Other Cancer Maternal " Grandfather         Grandpa had leukemia in his 80's     Unknown/Adopted Other         We have an adopted son, Jeremiah     Substance Abuse Father         My Dad has now been sober for over 40 years     Breast Cancer No family hx of       Cancer - colorectal No family hx of           Mother--well  Father--well  Sibs--well  Mat grandfather--leukemia  Pat grandfather--etoh    No chagnes today     SH  Social History   Social History            Social History     Marital status:        Spouse name: N/A     Number of children: N/A     Years of education: N/A           Occupational History     Mental Health therapist                Social History Main Topics     Smoking status: Never Smoker     Smokeless tobacco: Never Used         Comment: Never been a smoker     Alcohol use 0.0 oz/week          Comment: On occasion     Drug use: No     Sexual activity: Yes       Partners: Male       Birth control/ protection: Pill           Other Topics Concern     Not on file          Social History Narrative     How much exercise per week? 3 days     How much calcium per day? 1500 mg                                           How much caffeine per day? 1 cup soffee     How much vitamin D per day? 1000 iu     Do you/your family wear seatbelts?  Yes     Do you/your family use safety helmets? Yes     Do you/your family use sunscreen? Yes     Do you/your family keep firearms in the home? No     Do you/your family have a smoke detector(s)? Yes           Do you feel safe in your home? Yes     Has anyone ever touched you in an unwanted manner? No                  Explain            January 27, 2015 Alexandra Wheat LPN                           Little meat, mainly chicken fish  Activity as above  Adopted son 3 1/2, and adopted now 4 month old    Work--[employer], full time  ETOH--2/week, 2/sitting       ROS  Answers for HPI/ROS submitted by the patient on 11/22/2019   VIRGILIO 7 TOTAL SCORE: 0  General Symptoms: No  Skin  "Symptoms: No  HENT Symptoms: No  EYE SYMPTOMS: No  HEART SYMPTOMS: No  LUNG SYMPTOMS: No  INTESTINAL SYMPTOMS: No  URINARY SYMPTOMS: No  GYNECOLOGIC SYMPTOMS: No  BREAST SYMPTOMS: No  SKELETAL SYMPTOMS: No  BLOOD SYMPTOMS: No  NERVOUS SYSTEM SYMPTOMS: No  MENTAL HEALTH SYMPTOMS: No  Reviewed with patient.    Exam  Blood pressure 128/84, pulse 74, height 1.753 m (5' 9\"), weight 70.2 kg (154 lb 11.2 oz), not currently breastfeeding.  Constitutional: Well appearing woman in no acute distress.   Psychological: appropriate mood.  Eyes: anicteric, normal extra-ocular movements,  pupils are equal and reactive to light.   Ears, Nose and Throat: tympanic membranes clear, nose clear and free of lesions, throat clear, moist mucous membrames, neck supple with full range of motion.    Neck: No thyroidmegaly. No jugular venous distension, no carotid bruits.  Cardiovascular: regular rate and rhythm, normal S1 and S2, no murmurs, rubs or gallops, peripheral pulses full and symmetric   Respiratory: clear to auscultation, no wheezes or crackles, normal breath sounds.    Gastrointestinal: positive bowel sounds, nontender, no hepatosplenomegaly, no masses. No guarding or rebound.  Lymphatic: no cervical lymphadenopathy.  Musculoskeletal: full range of motion, no edema and motor strength is equal in the upper and lower extremities    Skin: no concerning lesions, no jaundice. Birthmark above L elbow unchanged  Neurological: cranial nerves intact, normal strength and sensation, reflexes at patella and biceps normal, normal gait, no tremor.   Monofilament Foot Exam: n/a    A/P  1. HCM  Immunizations--  Need: dTap      Cancer screening--Up to date  CV risk- Up to date    Bone Health--Up to date  Recommend 1000 units Vitamin D supplement in winter  Continue weight bearing activity, continue OCP for bone density protection    STI/HIV Screening --Up to Date     2. 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

## 2019-12-26 NOTE — TELEPHONE ENCOUNTER
DIAGNOSIS: I fractured my right hand on [address]. At urgent care they placed a splint on me and instructed me to follow up with a sports medicine/orthopedic in a week or so.   APPOINTMENT DATE: 1/2   NOTES STATUS DETAILS   OFFICE NOTE from referring provider N/A    OFFICE NOTE from other specialist Care Everywhere [facility]   DISCHARGE SUMMARY from hospital N/A    DISCHARGE REPORT from the ER N/A    OPERATIVE REPORT N/A    MEDICATION LIST Internal    MRI N/A    CT SCAN N/A    XRAYS (IMAGES & REPORTS) recieved  12/24/19         Sent fax request to  for xray  12/26-recieved

## 2020-01-02 ENCOUNTER — PRE VISIT (OUTPATIENT)
Dept: ORTHOPEDICS | Facility: CLINIC | Age: 35
End: 2020-01-02

## 2020-01-02 ENCOUNTER — ANCILLARY PROCEDURE (OUTPATIENT)
Dept: GENERAL RADIOLOGY | Facility: CLINIC | Age: 35
End: 2020-01-02
Attending: FAMILY MEDICINE
Payer: COMMERCIAL

## 2020-01-02 ENCOUNTER — OFFICE VISIT (OUTPATIENT)
Dept: ORTHOPEDICS | Facility: CLINIC | Age: 35
End: 2020-01-02
Payer: COMMERCIAL

## 2020-01-02 VITALS — WEIGHT: 154 LBS | HEIGHT: 69 IN | BODY MASS INDEX: 22.81 KG/M2

## 2020-01-02 DIAGNOSIS — S62.91XA CLOSED FRACTURE OF RIGHT HAND, INITIAL ENCOUNTER: Primary | ICD-10-CM

## 2020-01-02 DIAGNOSIS — M79.641 RIGHT HAND PAIN: ICD-10-CM

## 2020-01-02 DIAGNOSIS — M79.641 RIGHT HAND PAIN: Primary | ICD-10-CM

## 2020-01-02 ASSESSMENT — PAIN SCALES - GENERAL: PAINLEVEL: MILD PAIN (3)

## 2020-01-02 ASSESSMENT — MIFFLIN-ST. JEOR: SCORE: 1462.92

## 2020-01-02 NOTE — LETTER
"  1/2/2020      RE: Erika Ziegler  [address]  [place] [address]       Sports Medicine Clinic Visit    PCP: Saritha Rodney    Erika Ziegler is a 34 year old LHD female who is seen  as self referral presenting with right hand pain. LHD. Job involves desk work and typing.    Injury: 12/24/2019- Hyperextended 3rd, 4th, and 5th fingers when she tripped over an ottoman.    Location of Pain: right hand  Duration of Pain: 9 day(s)  Rating of Pain: 3/10  Pain is better with: Splint, ibuprofen, tylenol  Additional Features: Swelling and ecchymosis  Treatment so far consists of: Splint, ibuprofen, tylenol  Prior History of related problems: None    Ht 1.753 m (5' 9\")   Wt 69.9 kg (154 lb)   BMI 22.74 kg/m            PMH:  Past Medical History:   Diagnosis Date     Abnormal Pap smear 4/13     Cervix cancer (H) 4/2013    neuroendocrine     Hx of previous reproductive problem 5/13    Due to cancer treatment       Active problem list:  Patient Active Problem List   Diagnosis     Cervical ca (H)     MALIG NEOPLASM EXOCERVIX     Adjustment disorder with mixed anxiety and depressed mood     Pulmonary nodules       FH:  Family History   Problem Relation Age of Onset     Cancer Maternal Grandfather         leukemia     Other Cancer Maternal Grandfather         Grandpa had leukemia in his 80's     Unknown/Adopted Other         We have an adopted son, Jeremiah     Substance Abuse Father         My Dad has now been sober for over 40 years     Unknown/Adopted Other         Adopted 8/1/2019     Breast Cancer No family hx of      Cancer - colorectal No family hx of        SH:  Social History     Socioeconomic History     Marital status:      Spouse name: Not on file     Number of children: Not on file     Years of education: Not on file     Highest education level: Not on file   Occupational History     Occupation: Mental Health therapist   Social Needs     Financial resource strain: Not on file     Food " insecurity:     Worry: Not on file     Inability: Not on file     Transportation needs:     Medical: Not on file     Non-medical: Not on file   Tobacco Use     Smoking status: Never Smoker     Smokeless tobacco: Never Used     Tobacco comment: Never been a smoker   Substance and Sexual Activity     Alcohol use: Yes     Alcohol/week: 0.0 standard drinks     Comment: On occasion     Drug use: No     Sexual activity: Yes     Partners: Male     Birth control/protection: Pill   Lifestyle     Physical activity:     Days per week: Not on file     Minutes per session: Not on file     Stress: Not on file   Relationships     Social connections:     Talks on phone: Not on file     Gets together: Not on file     Attends Hindu service: Not on file     Active member of club or organization: Not on file     Attends meetings of clubs or organizations: Not on file     Relationship status: Not on file     Intimate partner violence:     Fear of current or ex partner: Not on file     Emotionally abused: Not on file     Physically abused: Not on file     Forced sexual activity: Not on file   Other Topics Concern     Parent/sibling w/ CABG, MI or angioplasty before 65F 55M? Not Asked   Social History Narrative    How much exercise per week? 3 days    How much calcium per day? 1500 mg       How much caffeine per day? 1 cup soffee    How much vitamin D per day? 1000 iu    Do you/your family wear seatbelts?  Yes    Do you/your family use safety helmets? Yes    Do you/your family use sunscreen? Yes    Do you/your family keep firearms in the home? No    Do you/your family have a smoke detector(s)? Yes        Do you feel safe in your home? Yes    Has anyone ever touched you in an unwanted manner? No     Explain         January 27, 2015 Alexandra Wheat LPN               MEDS:  See EMR, reviewed  ALL:  See EMR, reviewed    REVIEW OF SYSTEMS:  CONSTITUTIONAL:NEGATIVE for fever, chills, change in weight  INTEGUMENTARY/SKIN: NEGATIVE for worrisome  rashes, moles or lesions  EYES: NEGATIVE for vision changes or irritation  ENT/MOUTH: NEGATIVE for ear, mouth and throat problems  RESP:NEGATIVE for significant cough or SOB  BREAST: NEGATIVE for masses, tenderness or discharge  CV: NEGATIVE for chest pain, palpitations or peripheral edema  GI: NEGATIVE for nausea, abdominal pain, heartburn, or change in bowel habits  :NEGATIVE for frequency, dysuria, or hematuria  :NEGATIVE for frequency, dysuria, or hematuria  NEURO: NEGATIVE for weakness, dizziness or paresthesias  ENDOCRINE: NEGATIVE for temperature intolerance, skin/hair changes  HEME/ALLERGY/IMMUNE: NEGATIVE for bleeding problems  PSYCHIATRIC: NEGATIVE for changes in mood or affect      XR Hand Rt 3+ Views12/24/2019  [facility]  Result Narrative   EXAM: XR HAND RT 3+ VIEWS  LOCATION: [facility]  DATE/TIME: 12/24/2019 12:07 PM    INDICATION: Pain. Fell and injured hand - pain in the r 4th metacarpal.  COMPARISON: None.    IMPRESSION: There is oblique minimally displaced fracture involving the midportion of the fourth metacarpal diaphysis. Alignment is anatomic. No dislocation.         Subjective: 34-year-old female with the above injury, HPI as above.    Objective she has bruising and discoloration over the dorsum of her right hand.  The skin is intact.  She has normal sensation in the fingertips and normal capillary refill.  With making a fist she has scissoring of the fourth and third digits that is not present in her opposite hand.  She is nontender at the wrist at the radius or ulna or scaphoid.  She is without discomfort at the elbow or shoulder.  Distal pulses are intact.  Appropriate in conversation and affect.    An x-ray shows a spiral fracture of the fourth metacarpal shaft with dorsal displacement    Assessment right hand fourth metacarpal spiral fracture with rotational deformity    Plan: She was placed in a volar splint that she could remove for hygiene and then replaced for  comfort.  She will be seen by hand surgery in consultation.        Cast/splint application  Date/Time: 1/2/2020 3:36 PM  Performed by: Nick Kaplan MD  Authorized by: Nick Kaplan MD     Consent:     Consent obtained:  Verbal    Consent given by:  Patient  Pre-procedure details:     Sensation:  Normal  Procedure details:     Laterality:  Right    Location:  Hand    Hand:  R hand    Splint type:  Short arm (static)    Supplies used: [employer].  Post-procedure details:     Sensation:  Normal    Patient tolerance of procedure:  Tolerated well, no immediate complications    Patient provided with cast or splint care instructions: Yes          Nick Kaplan MD

## 2020-01-02 NOTE — PROGRESS NOTES
"Sports Medicine Clinic Visit    PCP: Saritha Rodney Toney is a 34 year old LHD female who is seen  as self referral presenting with right hand pain. LHD. Job involves desk work and typing.    Injury: 12/24/2019- Hyperextended 3rd, 4th, and 5th fingers when she tripped over an ottoman.    Location of Pain: right hand  Duration of Pain: 9 day(s)  Rating of Pain: 3/10  Pain is better with: Splint, ibuprofen, tylenol  Additional Features: Swelling and ecchymosis  Treatment so far consists of: Splint, ibuprofen, tylenol  Prior History of related problems: None    Ht 1.753 m (5' 9\")   Wt 69.9 kg (154 lb)   BMI 22.74 kg/m           PMH:  Past Medical History:   Diagnosis Date     Abnormal Pap smear 4/13     Cervix cancer (H) 4/2013    neuroendocrine     Hx of previous reproductive problem 5/13    Due to cancer treatment       Active problem list:  Patient Active Problem List   Diagnosis     Cervical ca (H)     MALIG NEOPLASM EXOCERVIX     Adjustment disorder with mixed anxiety and depressed mood     Pulmonary nodules       FH:  Family History   Problem Relation Age of Onset     Cancer Maternal Grandfather         leukemia     Other Cancer Maternal Grandfather         Grandpa had leukemia in his 80's     Unknown/Adopted Other         We have an adopted son, Jeremiah     Substance Abuse Father         My Dad has now been sober for over 40 years     Unknown/Adopted Other         Adopted 8/1/2019     Breast Cancer No family hx of      Cancer - colorectal No family hx of        SH:  Social History     Socioeconomic History     Marital status:      Spouse name: Not on file     Number of children: Not on file     Years of education: Not on file     Highest education level: Not on file   Occupational History     Occupation: Mental Health therapist   Social Needs     Financial resource strain: Not on file     Food insecurity:     Worry: Not on file     Inability: Not on file     Transportation needs:     " Medical: Not on file     Non-medical: Not on file   Tobacco Use     Smoking status: Never Smoker     Smokeless tobacco: Never Used     Tobacco comment: Never been a smoker   Substance and Sexual Activity     Alcohol use: Yes     Alcohol/week: 0.0 standard drinks     Comment: On occasion     Drug use: No     Sexual activity: Yes     Partners: Male     Birth control/protection: Pill   Lifestyle     Physical activity:     Days per week: Not on file     Minutes per session: Not on file     Stress: Not on file   Relationships     Social connections:     Talks on phone: Not on file     Gets together: Not on file     Attends Quaker service: Not on file     Active member of club or organization: Not on file     Attends meetings of clubs or organizations: Not on file     Relationship status: Not on file     Intimate partner violence:     Fear of current or ex partner: Not on file     Emotionally abused: Not on file     Physically abused: Not on file     Forced sexual activity: Not on file   Other Topics Concern     Parent/sibling w/ CABG, MI or angioplasty before 65F 55M? Not Asked   Social History Narrative    How much exercise per week? 3 days    How much calcium per day? 1500 mg       How much caffeine per day? 1 cup soffee    How much vitamin D per day? 1000 iu    Do you/your family wear seatbelts?  Yes    Do you/your family use safety helmets? Yes    Do you/your family use sunscreen? Yes    Do you/your family keep firearms in the home? No    Do you/your family have a smoke detector(s)? Yes        Do you feel safe in your home? Yes    Has anyone ever touched you in an unwanted manner? No     Explain         January 27, 2015 Alexandra Wheat LPN               MEDS:  See EMR, reviewed  ALL:  See EMR, reviewed    REVIEW OF SYSTEMS:  CONSTITUTIONAL:NEGATIVE for fever, chills, change in weight  INTEGUMENTARY/SKIN: NEGATIVE for worrisome rashes, moles or lesions  EYES: NEGATIVE for vision changes or irritation  ENT/MOUTH:  NEGATIVE for ear, mouth and throat problems  RESP:NEGATIVE for significant cough or SOB  BREAST: NEGATIVE for masses, tenderness or discharge  CV: NEGATIVE for chest pain, palpitations or peripheral edema  GI: NEGATIVE for nausea, abdominal pain, heartburn, or change in bowel habits  :NEGATIVE for frequency, dysuria, or hematuria  :NEGATIVE for frequency, dysuria, or hematuria  NEURO: NEGATIVE for weakness, dizziness or paresthesias  ENDOCRINE: NEGATIVE for temperature intolerance, skin/hair changes  HEME/ALLERGY/IMMUNE: NEGATIVE for bleeding problems  PSYCHIATRIC: NEGATIVE for changes in mood or affect      XR Hand Rt 3+ Views12/24/2019  [facility]  Result Narrative   EXAM: XR HAND RT 3+ VIEWS  LOCATION: [facility]  DATE/TIME: 12/24/2019 12:07 PM    INDICATION: Pain. Fell and injured hand - pain in the r 4th metacarpal.  COMPARISON: None.    IMPRESSION: There is oblique minimally displaced fracture involving the midportion of the fourth metacarpal diaphysis. Alignment is anatomic. No dislocation.         Subjective: 34-year-old female with the above injury, HPI as above.    Objective she has bruising and discoloration over the dorsum of her right hand.  The skin is intact.  She has normal sensation in the fingertips and normal capillary refill.  With making a fist she has scissoring of the fourth and third digits that is not present in her opposite hand.  She is nontender at the wrist at the radius or ulna or scaphoid.  She is without discomfort at the elbow or shoulder.  Distal pulses are intact.  Appropriate in conversation and affect.    An x-ray shows a spiral fracture of the fourth metacarpal shaft with dorsal displacement    Assessment right hand fourth metacarpal spiral fracture with rotational deformity    Plan: She was placed in a volar splint that she could remove for hygiene and then replaced for comfort.  She will be seen by hand surgery in consultation.        Cast/splint  application  Date/Time: 1/2/2020 3:36 PM  Performed by: Nick Kaplan MD  Authorized by: Nick Kaplan MD     Consent:     Consent obtained:  Verbal    Consent given by:  Patient  Pre-procedure details:     Sensation:  Normal  Procedure details:     Laterality:  Right    Location:  Hand    Hand:  R hand    Splint type:  Short arm (static)    Supplies used: [employer].  Post-procedure details:     Sensation:  Normal    Patient tolerance of procedure:  Tolerated well, no immediate complications    Patient provided with cast or splint care instructions: Yes

## 2020-01-06 ENCOUNTER — OFFICE VISIT (OUTPATIENT)
Dept: ORTHOPEDICS | Facility: CLINIC | Age: 35
End: 2020-01-06
Payer: COMMERCIAL

## 2020-01-06 ENCOUNTER — ANESTHESIA EVENT (OUTPATIENT)
Dept: SURGERY | Facility: CLINIC | Age: 35
End: 2020-01-06

## 2020-01-06 ENCOUNTER — TELEPHONE (OUTPATIENT)
Dept: ORTHOPEDICS | Facility: CLINIC | Age: 35
End: 2020-01-06

## 2020-01-06 ENCOUNTER — OFFICE VISIT (OUTPATIENT)
Dept: SURGERY | Facility: CLINIC | Age: 35
End: 2020-01-06
Payer: COMMERCIAL

## 2020-01-06 VITALS
WEIGHT: 157.1 LBS | OXYGEN SATURATION: 99 % | BODY MASS INDEX: 23.27 KG/M2 | HEART RATE: 84 BPM | DIASTOLIC BLOOD PRESSURE: 89 MMHG | HEIGHT: 69 IN | SYSTOLIC BLOOD PRESSURE: 145 MMHG | TEMPERATURE: 98 F | RESPIRATION RATE: 16 BRPM

## 2020-01-06 DIAGNOSIS — S62.314A CLOSED DISPLACED FRACTURE OF BASE OF FOURTH METACARPAL BONE OF RIGHT HAND, INITIAL ENCOUNTER: Primary | ICD-10-CM

## 2020-01-06 DIAGNOSIS — Z01.818 PREOP EXAMINATION: Primary | ICD-10-CM

## 2020-01-06 RX ORDER — ACETAMINOPHEN 500 MG
500-1000 TABLET ORAL EVERY 6 HOURS PRN
COMMUNITY
End: 2020-12-02

## 2020-01-06 RX ORDER — IBUPROFEN 200 MG
200-400 TABLET ORAL PRN
COMMUNITY
End: 2020-12-02

## 2020-01-06 ASSESSMENT — LIFESTYLE VARIABLES: TOBACCO_USE: 0

## 2020-01-06 ASSESSMENT — PAIN SCALES - GENERAL: PAINLEVEL: MILD PAIN (2)

## 2020-01-06 ASSESSMENT — MIFFLIN-ST. JEOR: SCORE: 1476.98

## 2020-01-06 NOTE — TELEPHONE ENCOUNTER
FUTURE VISIT INFORMATION      SURGERY INFORMATION:    Date: Luis DOE    RECORDS REQUESTED FROM:       Primary Care Provider:    Pertinent Medical History:    Most recent EKG+ Tracing:    Most recent ECHO:    Most recent Cardiac Stress Test:    Most recent Coronary Angiogram:    Most recent PFT's:    Most recent Sleep Study:

## 2020-01-06 NOTE — ANESTHESIA PREPROCEDURE EVALUATION
Anesthesia Pre-Procedure Evaluation    Patient: Erika Ziegler   MRN:     [id number] Gender:   female   Age:    34 year old :      1985        Preoperative Diagnosis: * No surgery found *        Past Medical History:   Diagnosis Date     Abnormal Pap smear      Cervix cancer (H) 2013    neuroendocrine     Hx of previous reproductive problem     Due to cancer treatment      Past Surgical History:   Procedure Laterality Date     BIOPSY       EXAM UNDER ANESTHESIA, INSERT JOESPH SLEEVE, UTERINE PLACEMENT OF TANDEM AND RING FOR RAD, ULTRASOUND  2013    Procedure: EXAM UNDER ANESTHESIA, INSERT JOESPH SLEEVE, UTERINE PLACEMENT OF TANDEM AND RING FOR RADIATION, ULTRASOUND GUIDED;  Pelvic Exam, Insert JOESPH Sleeve with Ultrasound Guidance and Place Tandem Ring for High Dose Radiation;  Surgeon: Roxy Brar MD;  Location: [facility] OR     LAPAROSCOPIC SALPINGO-OOPHORECTOMY  2013    Procedure: LAPAROSCOPIC SALPINGO-OOPHORECTOMY;  Laparoscopy, Left  salpingectomy,Ovarian Transposition, Right Salpingo Oophorectomy , Anesthesia General with block;  Surgeon: Deanna Salinas MD;  Location: [facility] OR     wisdom teeth            Anesthesia Evaluation     . Pt has had prior anesthetic.     No history of anesthetic complications          ROS/MED HX    ENT/Pulmonary:      (-) tobacco use, asthma, sleep apnea and recent URI   Neurologic:  - neg neurologic ROS     Cardiovascular:  - neg cardiovascular ROS   (+) ----. : . . . :. . No previous cardiac testing      (-) syncope   METS/Exercise Tolerance:  >4 METS   Hematologic:  - neg hematologic  ROS      (-) history of blood clots and History of Transfusion   Musculoskeletal: Comment: H/o right knee dislocation  (+) fracture upper extremity,  -       GI/Hepatic:  - neg GI/hepatic ROS       Renal/Genitourinary:  - ROS Renal section negative       Endo:  - neg endo ROS       Psychiatric:  - neg psychiatric ROS       Infectious Disease:  - neg infectious disease ROS       (-) Recent Fever   Malignancy:   (+) Malignancy History of Other  Other CA status post Surgery         Other:    (+) No chance of pregnancy no H/O Chronic Pain,                       PHYSICAL EXAM:   Mental Status/Neuro: A/A/O; Age Appropriate   Airway: Facies: Feasible  Mallampati: I  Mouth/Opening: Full  TM distance: > 6 cm  Neck ROM: Full   Respiratory: Auscultation: CTAB     Resp. Rate: Normal     Resp. Effort: Normal      CV: Rhythm: Regular  Rate: Age appropriate  Heart: Normal Sounds  Edema: None   Comments:      Dental: Normal Dentition                LABS:  CBC:   Lab Results   Component Value Date    WBC 3.9 (L) 04/04/2015    WBC 3.6 (L) 07/16/2014    HGB 14.2 04/04/2015    HGB 13.8 07/16/2014    HCT 41.9 04/04/2015    HCT 40.8 07/16/2014     04/04/2015     07/16/2014     BMP:   Lab Results   Component Value Date     04/04/2015     10/29/2013    POTASSIUM 3.6 04/04/2015    POTASSIUM 3.7 10/29/2013    CHLORIDE 108 04/04/2015    CHLORIDE 105 10/29/2013    CO2 27 04/04/2015    CO2 27 10/29/2013    BUN 8 04/04/2015    BUN 15 10/29/2013    CR 0.74 04/04/2015    CR 0.66 10/29/2013    GLC 88 04/04/2015    GLC 94 10/29/2013     COAGS:   Lab Results   Component Value Date    INR <0.9 07/16/2014     POC:   Lab Results   Component Value Date    BGM 78 12/16/2013    HCG Negative 06/24/2013    HCGS Negative 05/16/2013     OTHER:   Lab Results   Component Value Date    LACT 1.2 04/04/2015    LEWIS 9.0 04/04/2015    MAG 1.7 04/04/2015    ALBUMIN 4.1 04/04/2015    PROTTOTAL 7.6 04/04/2015    ALT 45 04/04/2015    AST 22 04/04/2015    ALKPHOS 43 04/04/2015    BILITOTAL 0.3 04/04/2015    LIPASE 128 04/04/2015        Preop Vitals    BP Readings from Last 3 Encounters:   12/04/19 128/84   09/03/19 136/85   10/17/18 131/85    Pulse Readings from Last 3 Encounters:   12/04/19 74   09/03/19 90   10/17/18 80      Resp Readings from Last 3 Encounters:   09/03/19 14   09/13/18 14   02/20/18 18    SpO2  "Readings from Last 3 Encounters:   09/03/19 97%   09/13/18 99%   02/20/18 96%      Temp Readings from Last 1 Encounters:   09/03/19 98.1  F (36.7  C) (Oral)    Ht Readings from Last 1 Encounters:   01/02/20 1.753 m (5' 9\")      Wt Readings from Last 1 Encounters:   01/02/20 69.9 kg (154 lb)    Estimated body mass index is 22.74 kg/m  as calculated from the following:    Height as of 1/2/20: 1.753 m (5' 9\").    Weight as of 1/2/20: 69.9 kg (154 lb).     LDA:  Peripheral IV 12/16/13 Right (Active)   Number of days: 2212       Peripheral IV 07/16/14 Right Lower forearm (Active)   Number of days: 2000       Peripheral IV 08/13/18 Right (Active)   Number of days: 511       Rectal Tube With balloon (Active)   Number of days: 2387        JZG FV AN PLAN NO PONV RULE       PAC Discussion and Assessment    ASA Classification: 2  Case is suitable for: ASC  Anesthetic techniques and relevant risks discussed: PAC Recommendations anesthetic techniques: tbd.  Invasive monitoring and risk discussed: No  Types:   Possibility and Risk of blood transfusion discussed: No  NPO instructions given:   Additional anesthetic preparation and risks discussed:   Needs early admission to pre-op area:   Other:     PAC Resident/NP Anesthesia Assessment:  Erika Ziegler is a 34 year old female scheduled for ORIF of right 4th metacarpal on TBD by Dr. Kellogg in treatment of sprial fracture of right 4th metacarpal.  PAC referral for risk assessment and optimization for anesthesia:    Pre-operative considerations:  1.  Cardiac:  Functional status- METS >4.  Low risk surgery with 0.4% (RCRI 0) risk of major adverse cardiac event.   -denies CP, SOB, GARCIA, palpitation  -denies cardiac history    2.  Pulm:  Airway feasible.  NEETU risk: low  -never smoker    3.  GI:  Risk of PONV score = 3.  If > 2, anti-emetic intervention recommended.    VTE risk: 0.26%      **For further details of assessment, testing, and physical exam please see H and P completed on " same date.          Giana Ngo PA-C, [facility]      Reviewed and Signed by PAC Mid-Level Provider/Resident  Mid-Level Provider/Resident: Giana Ngo  Date: 1/6/2020  Time:     Attending Anesthesiologist Anesthesia Assessment:        Anesthesiologist:   Date:   Time:   Pass/Fail:   Disposition:     PAC Pharmacist Assessment:        Pharmacist:   Date:   Time:    Giana Ngo PA-C

## 2020-01-06 NOTE — PROGRESS NOTES
[facility]  Orthopedics  Luis Kellogg MD  2020     Name: Erika Ziegler  MRN: [id number]  Age: 34 year old  : 1985  Referring provider: Nick Kaplan     Chief Complaint: Right fourth metacarpal spiral fracture     Date of Injury: 2019    History of Present Illness:   Erika Ziegler is a 34 year old left hand dominant female who presents today for evaluation of a fourth metacarpal fracture. The patient reports that on 19, she tripped over an ottoman and fell and hyperextended her right 3rd, 4th, and 5th fingers. She states she was seen initially in urgent care and found to have a fracture of the fourth metacarpal on the right and placed in a splint. She says she was seen four days ago by Dr. Kaplan with sports medicine, where she had her splint redone. She now finds her splint more comfortable. 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  She was referred here for Dr. Kaplan for consideration of whether she might need surgery.      Review of Systems:   A 10-point review of systems was obtained and is negative except for as noted in the HPI.     Medications:   Norgestrel-ethinyl estradiol (ELINEST) 0.3-30 MG-MCG tablet, Take 1 tablet by mouth daily, Disp: 28 tablet, Rfl: 11    Allergies:  Patient has no known allergies.     Past Medical History:  Abnormal pap smear   Cervix cancer (neuroendocrine)    Past Surgical History:  Biopsy  Insert Petros sleeve, uterine placement of tandem and ring for rad -   Laparoscopic salpingooophorectomy -     Social History:  Smoker: never smoker  Drug use: no     Family History:  Leukemia, substance abuse    Physical Examination:  Well-developed, well-nourished and in no acute distress.  Alert and oriented to surroundings.    right ring finger:   Skin: Ecchymosis over ring finger metacarpal  Dorsally and in the palm  Tenderness: present over fourth metacarpal  Swelling: Mild swelling over fourth metacarpal    Deformity: No deformity in the coronal plane. Rotational deformity difficulty to assess.   Approximately 20 degree extension lag of ring finger with attempted extension.  Able to make only very loose composite fist.   Sensation is intact throughout    Imaging:   XR Hand Right 3 Views (1/2/2020):  1. Redemonstration of fourth metacarpal spiral fracture in unchanged  alignment with mild displacement.  2. Preserved joint spaces.  Per radiology    I have independently reviewed the above imaging studies; the results were discussed with the patient.     Assessment:   34 year old female with a right fourth metacarpal spiral fracture.  We had a long discussion about this injury.  I do not appreciate a major rotational deformity although given her difficulty making a fist it is impossible to fully evaluate.  she does have a small extensor lag and given her shortening, this may persist after fracture healing.  She will also have a minor dorsal bump. These will likely not be functionally major issues although they may be noticeable. Erika would like to avoid this so prefers to proceed surgically. This will entail open reduction internal fixation.     Plan:   I described the procedure, post-operative protocol, and expected outcomes. I also discussed the risks of surgery including but not limited to infection, bleeding, stiffness, pain, scarring, complex regional pain syndrome, damage to nerves, blood vessels, or tendons, malunion, nonunion, hardware irritation or failure, need for further surgery, and anesthetic risks.  After a full discussion of risks, benefits, and alternatives to surgery, the patient expressed understanding and elected to proceed with surgical fixation of the right fourth metacarpal fracture. Informed consent was obtained. My office will contact the patient to schedule surgery.     Luis Kellogg MD        Scribe Disclosure:  I, Buzz Escobar, am serving as a scribe to document services personally  performed by Luis Kellogg MD at this visit, based upon the provider's statements to me. All documentation has been reviewed by the aforementioned provider prior to being entered into the official medical record.

## 2020-01-06 NOTE — LETTER
2020       RE: Erika Ziegler  [address]  [place] [address]     Dear Colleague,    Thank you for referring your patient, Erika Ziegler, to the [facility] at [facility]. Please see a copy of my visit note below.    [facility]  Orthopedics  Luis Kellogg MD  2020     Name: Erika Ziegler  MRN: [id number]  Age: 34 year old  : 1985  Referring provider: Nick Kaplan     Chief Complaint: Right fourth metacarpal spiral fracture     Date of Injury: 2019    History of Present Illness:   Erika Ziegler is a 34 year old left hand dominant female who presents today for evaluation of a fourth metacarpal fracture. The patient reports that on 19, she tripped over an ottoman and fell and hyperextended her right 3rd, 4th, and 5th fingers. She states she was seen initially in urgent care and found to have a fracture of the fourth metacarpal on the right and placed in a splint. She says she was seen four days ago by Dr. Kaplan with sports medicine, where she had her splint redone. She now finds her splint more comfortable. 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  She was referred here for Dr. Kaplan for consideration of whether she might need surgery.      Review of Systems:   A 10-point review of systems was obtained and is negative except for as noted in the HPI.     Medications:   Norgestrel-ethinyl estradiol (ELINEST) 0.3-30 MG-MCG tablet, Take 1 tablet by mouth daily, Disp: 28 tablet, Rfl: 11    Allergies:  Patient has no known allergies.     Past Medical History:  Abnormal pap smear   Cervix cancer (neuroendocrine)    Past Surgical History:  Biopsy  Insert Petros sleeve, uterine placement of tandem and ring for rad -   Laparoscopic salpingooophorectomy - 2013    Social History:  Smoker: never smoker  Drug use: no     Family History:  Leukemia, substance abuse    Physical  Examination:  Well-developed, well-nourished and in no acute distress.  Alert and oriented to surroundings.    right ring finger:   Skin: Ecchymosis over ring finger metacarpal  Dorsally and in the palm  Tenderness: present over fourth metacarpal  Swelling: Mild swelling over fourth metacarpal   Deformity: No deformity in the coronal plane. Rotational deformity difficulty to assess.   Approximately 20 degree extension lag of ring finger with attempted extension.  Able to make only very loose composite fist.   Sensation is intact throughout    Imaging:   XR Hand Right 3 Views (1/2/2020):  1. Redemonstration of fourth metacarpal spiral fracture in unchanged  alignment with mild displacement.  2. Preserved joint spaces.  Per radiology    I have independently reviewed the above imaging studies; the results were discussed with the patient.     Assessment:   34 year old female with a right fourth metacarpal spiral fracture.  We had a long discussion about this injury.  I do not appreciate a major rotational deformity although given her difficulty making a fist it is impossible to fully evaluate.  she does have a small extensor lag and given her shortening, this may persist after fracture healing.  She will also have a minor dorsal bump. These will likely not be functionally major issues although they may be noticeable. Erika would like to avoid this so prefers to proceed surgically. This will entail open reduction internal fixation.     Plan:   I described the procedure, post-operative protocol, and expected outcomes. I also discussed the risks of surgery including but not limited to infection, bleeding, stiffness, pain, scarring, complex regional pain syndrome, damage to nerves, blood vessels, or tendons, malunion, nonunion, hardware irritation or failure, need for further surgery, and anesthetic risks.  After a full discussion of risks, benefits, and alternatives to surgery, the patient expressed understanding and  elected to proceed with surgical fixation of the right fourth metacarpal fracture. Informed consent was obtained. My office will contact the patient to schedule surgery.     Luis Kellogg MD        Scribe Disclosure:  I, Buzz Escobar, am serving as a scribe to document services personally performed by Luis Kellogg MD at this visit, based upon the provider's statements to me. All documentation has been reviewed by the aforementioned provider prior to being entered into the official medical record.

## 2020-01-06 NOTE — NURSING NOTE
Teaching Flowsheet   Relevant Diagnosis: Right Ring finger FX  Teaching Topic: preop ORIF     Person(s) involved in teaching:   Patient     Motivation Level:  Asks Questions: Yes  Eager to Learn: Yes  Cooperative: Yes  Receptive (willing/able to accept information): Yes  Any cultural factors/Nondenominational beliefs that may influence understanding or compliance? No       Patient demonstrates understanding of the following:  Reason for the appointment, diagnosis and treatment plan: Yes  Knowledge of proper use of medications and conditions for which they are ordered (with special attention to potential side effects or drug interactions): Yes  Which situations necessitate calling provider and whom to contact: Yes       Teaching Concerns Addressed:        Proper use and care of meds. (medical equip, care aids, etc.): Yes  Nutritional needs and diet plan: Yes  Pain management techniques: Yes  Wound Care: Yes  How and/when to access community resources: Yes     Instructional Materials Used/Given: preop pkt     Time spent with patient: 15 minutes.

## 2020-01-06 NOTE — TELEPHONE ENCOUNTER
[facility] Call Center    Phone Message    May a detailed message be left on voicemail: yes    Reason for Call: Other: pt is calling as she was told she may have surgery tomorrow 1/7 and will be getting a call tonight or early in the morning to see if its going to happen, or if they have room for the pt, she has not heard anything, she would like to know if it is going on, and if she needs to stop eating, etc.. please call pt asap thanks     Action Taken: Message routed to:  [facility] ([facility] ortho

## 2020-01-06 NOTE — PATIENT INSTRUCTIONS
Preparing for Your Surgery      Name:  Erika Ziegler   MRN:  [id number]   :  1985   Today's Date:  2020     Arriving for surgery: No surgery date. Pre-Admissions will call you 1-2 business days prior to surgery to confirm surgery date/arrival time/location. ([phone] Mon- Fri 8 am- 3:30 pm)  Surgery date:    Arrival time:      Please come to:     -  Bring your ID and insurance card.    - If you are scheduled to go home the Same Day as surgery you must have a responsible adult as a  and to stay with you overnight the first 24 hours after surgery.     What can I eat or drink? Pre-Admissions will give you exact times.   -  You may have solid food or milk products until 8 hours prior to your surgery.   -  You may have water, apple juice or 7up/Sprite until 2 hours prior to your surgery.     Which medicines can I take?       -  Do not bring your own medications to the hospital.        -  Follow Orthopedic Clinic instructions regarding Ibuprofen. If no instructions given, NO Ibuprofen the day prior to surgery.          -  Hold Naproxen (Aleve) 2 days prior to surgery.        -  Hold Aspirin and Aspirin products for 7 days prior to surgery.    -  Please take these medications the morning of surgery:  Acetaminophen (Tylenol) if needed    How do I prepare myself?  -  Take two showers: one the night before surgery; and one the morning of surgery.         Use Scrubcare or Hibiclens to wash from neck down.  You may use your own shampoo and conditioner. No other hair products.   -  Do NOT use lotion, powder, colognes, deodorant, or antiperspirant the day of your surgery.  -  Do NOT wear any makeup, fingernail polish or jewelry.    Questions or Concerns:  If you have questions or concerns prior to your surgery, call [phone]. (Mon - Fri   8 am- 5:30 pm)  If your surgery is at the [facility], please call [phone]. (Mon - Fri 8 am- 3:30 pm)  Questions about surgery, contact your  Surgeons office.  If you have any health changes prior to surgery, please notify your Surgeon.    AFTER YOUR SURGERY  Breathing exercises   Breathing exercises help you recover faster. Take deep breaths and let the air out slowly. This will:     Help you wake up after surgery.    Help prevent complications like pneumonia.  Preventing complications will help you go home sooner.   We may give you a breathing device (incentive spirometer) to encourage you to breathe deeply.   Nausea and vomiting   You may feel sick to your stomach after surgery; if so, let your nurse know.    Pain control:  After surgery, you may have pain. Our goal is to help you manage your pain. Pain medicine will help you feel comfortable enough to do activities that will help you heal.  These activities may include breathing exercises, walking and physical therapy.   To help your health care team treat your pain we will ask: 1) If you have pain  2) where it is located 3) describe your pain in your words  Methods of pain control include medications given by mouth, vein or by nerve block for some surgeries.

## 2020-01-06 NOTE — NURSING NOTE
Reason For Visit:   Chief Complaint   Patient presents with     Consult     4th Metecarpal spiral fx. Right hand.      Primary MD: Saritha Rodney  Ref. MD: Dr Kaplan    ?  No    Age: 34 year old    Occupation  at a Non Profit.  Currently working? Yes.  Work status?  Full time.  Date of injury: 12/24/2019 - Tripped   Date of surgery: NA  Type of surgery: NA.  Smoker: No  Request smoking cessation information: No    There were no vitals taken for this visit.    Pain Assessment  Patient Currently in Pain: Yes  0-10 Pain Scale: 2     QuickDASH Assessment  No flowsheet data found.     No Known Allergies    Jody Hicks ATC

## 2020-01-06 NOTE — TELEPHONE ENCOUNTER
RN returned patient's phone call. Patient is wondering if surgery is a go tomorrow morning as she is still waiting to hear back from pre op. RN didn't see any notes other than her PAC visit today.  RN advised patient to play it safe and be NPO after midnight and call in the morning to find out more as the HAND team and Karo alberts, all left the clinic already.  Patient verbalized understanding.    Jenny Lugo RN

## 2020-01-06 NOTE — H&P
Pre-Operative H & P     CC:  Preoperative exam to assess for increased cardiopulmonary risk while undergoing surgery and anesthesia.    Date of Encounter: 1/6/2020  Primary Care Physician:  Saritha Rodney  Associated Diagnosis: right 4th metacarpal fracture    HPI  Erika Ziegler is a 34 year old female who presents for pre-operative H & P in preparation for ORIF of 4th metacarpal with Dr. Kellogg on TBD at [facility].     This is a 34-year-old female with a history of cervical cancer status post surgery and chemoradiation.  Her past medical history is otherwise unremarkable.  On Christmas eve she tripped over an ottoman at her house and fell onto an outstretched hand.  She immediately had pain and swelling of her right third fourth and fifth fingers.  She subsequently presented to urgent care or x-ray showed a fracture of the fourth metacarpal.  She then was evaluated by sports medicine and repeat x-ray showed a spiral fracture of the fourth metacarpal.  She was referred to Dr. Kellogg and orthopedic surgery who recommends the above procedure.  Patient says pain is adequately controlled with Tylenol and ibuprofen.  She will not take any ibuprofen after today due to possible surgery tomorrow.  Patient denies any cardiopulmonary history and she is not on any blood thinners.    History is obtained from the patient.     Past Medical History  Past Medical History:   Diagnosis Date     Abnormal Pap smear 4/13     Cervix cancer (H) 4/2013    neuroendocrine     Hx of previous reproductive problem 5/13    Due to cancer treatment       Past Surgical History  Past Surgical History:   Procedure Laterality Date     BIOPSY       EXAM UNDER ANESTHESIA, INSERT JOESPH SLEEVE, UTERINE PLACEMENT OF TANDEM AND RING FOR RAD, ULTRASOUND  6/24/2013    Procedure: EXAM UNDER ANESTHESIA, INSERT JOESPH SLEEVE, UTERINE PLACEMENT OF TANDEM AND RING FOR RADIATION, ULTRASOUND GUIDED;  Pelvic Exam, Insert JOESPH  Sleeve with Ultrasound Guidance and Place Tandem Ring for High Dose Radiation;  Surgeon: Roxy Brar MD;  Location: [facility] OR     LAPAROSCOPIC SALPINGO-OOPHORECTOMY  5/13/2013    Procedure: LAPAROSCOPIC SALPINGO-OOPHORECTOMY;  Laparoscopy, Left  salpingectomy,Ovarian Transposition, Right Salpingo Oophorectomy , Anesthesia General with block;  Surgeon: Deanna Salinas MD;  Location: [facility] OR     wisdom teeth         Hx of Blood transfusions/reactions: none     Hx of abnormal bleeding or anti-platelet use: none    Menstrual history: No LMP recorded. (Menstrual status: Amenorrhea).:     Steroid use in the last year: none    Personal or FH with difficulty with Anesthesia:  none    Prior to Admission Medications  Current Outpatient Medications   Medication Sig Dispense Refill     acetaminophen (TYLENOL) 500 MG tablet Take 500-1,000 mg by mouth every 6 hours as needed for mild pain (PT last dose 1.6.2020)       ibuprofen (ADVIL/MOTRIN) 200 MG tablet Take 200-400 mg by mouth as needed (PT last dose 1.6.2020)        norgestrel-ethinyl estradiol (ELINEST) 0.3-30 MG-MCG tablet Take 1 tablet by mouth daily (Patient taking differently: Take 1 tablet by mouth At Bedtime ) 28 tablet 11       Allergies  No Known Allergies    Social History  Social History     Socioeconomic History     Marital status:      Spouse name: Not on file     Number of children: Not on file     Years of education: Not on file     Highest education level: Not on file   Occupational History     Occupation: Mental Health therapist   Social Needs     Financial resource strain: Not on file     Food insecurity:     Worry: Not on file     Inability: Not on file     Transportation needs:     Medical: Not on file     Non-medical: Not on file   Tobacco Use     Smoking status: Never Smoker     Smokeless tobacco: Never Used     Tobacco comment: Never been a smoker   Substance and Sexual Activity     Alcohol use: Yes     Alcohol/week: 0.0 standard drinks      Comment: On occasion     Drug use: No     Sexual activity: Yes     Partners: Male     Birth control/protection: Pill   Lifestyle     Physical activity:     Days per week: Not on file     Minutes per session: Not on file     Stress: Not on file   Relationships     Social connections:     Talks on phone: Not on file     Gets together: Not on file     Attends Yazidi service: Not on file     Active member of club or organization: Not on file     Attends meetings of clubs or organizations: Not on file     Relationship status: Not on file     Intimate partner violence:     Fear of current or ex partner: Not on file     Emotionally abused: Not on file     Physically abused: Not on file     Forced sexual activity: Not on file   Other Topics Concern     Parent/sibling w/ CABG, MI or angioplasty before 65F 55M? Not Asked   Social History Narrative    How much exercise per week? 3 days    How much calcium per day? 1500 mg       How much caffeine per day? 1 cup soffee    How much vitamin D per day? 1000 iu    Do you/your family wear seatbelts?  Yes    Do you/your family use safety helmets? Yes    Do you/your family use sunscreen? Yes    Do you/your family keep firearms in the home? No    Do you/your family have a smoke detector(s)? Yes        Do you feel safe in your home? Yes    Has anyone ever touched you in an unwanted manner? No     Explain         January 27, 2015 Alexandra Wheat LPN               Family History  Family History   Problem Relation Age of Onset     Cancer Maternal Grandfather         leukemia     Other Cancer Maternal Grandfather         Grandpa had leukemia in his 80's     Unknown/Adopted Other         We have an adopted son, Jeremiah     Substance Abuse Father         My Dad has now been sober for over 40 years     Unknown/Adopted Other         Adopted 8/1/2019     Breast Cancer No family hx of      Cancer - colorectal No family hx of            Anesthesia Evaluation     . Pt has had prior anesthetic.     No  "history of anesthetic complications          ROS/MED HX  The complete review of systems is negative other than noted in the HPI or here.  ENT/Pulmonary:      (-) tobacco use, asthma, sleep apnea and recent URI   Neurologic:  - neg neurologic ROS     Cardiovascular:  - neg cardiovascular ROS   (+) ----. : . . . :. . No previous cardiac testing      (-) syncope   METS/Exercise Tolerance:  >4 METS   Hematologic:  - neg hematologic  ROS      (-) history of blood clots and History of Transfusion   Musculoskeletal: Comment: H/o right knee dislocation  (+) fracture upper extremity,  -       GI/Hepatic:  - neg GI/hepatic ROS       Renal/Genitourinary:  - ROS Renal section negative       Endo:  - neg endo ROS       Psychiatric:  - neg psychiatric ROS       Infectious Disease:  - neg infectious disease ROS      (-) Recent Fever   Malignancy:   (+) Malignancy History of Other  Other CA status post Surgery         Other:    (+) No chance of pregnancy no H/O Chronic Pain,           PHYSICAL EXAM:   Mental Status/Neuro: A/A/O; Age Appropriate   Airway: Facies: Feasible  Mallampati: I  Mouth/Opening: Full  TM distance: > 6 cm  Neck ROM: Full   Respiratory: Auscultation: CTAB     Resp. Rate: Normal     Resp. Effort: Normal      CV: Rhythm: Regular  Rate: Age appropriate  Heart: Normal Sounds  Edema: None   Comments:      Dental: Normal Dentition               Temp: 98  F (36.7  C) Temp src: Oral BP: (!) 145/89 Pulse: 84   Resp: 16 SpO2: 99 %         157 lbs 1.6 oz  5' 9\"   Body mass index is 23.2 kg/m .       Physical Exam  Constitutional: Awake, alert, cooperative, no apparent distress, and appears stated age.  Eyes: Pupils equal, round and reactive to light, extra ocular muscles intact, sclera clear, conjunctiva normal.  HENT: Normocephalic, oral pharynx with moist mucus membranes, good dentition. No goiter appreciated.   Respiratory: Clear to auscultation bilaterally, no crackles or wheezing.  Cardiovascular: Regular rate and " rhythm, normal S1 and S2, and no murmur noted.  Carotids +2, no bruits. No edema. Palpable pulses to radial (on left)  DP and PT arteries.   GI: Normal bowel sounds  Lymph/Hematologic: No cervical lymphadenopathy and no supraclavicular lymphadenopathy.  Genitourinary:  deferred  Skin: Warm and dry.  No rashes at anticipated surgical site.   Musculoskeletal: Full ROM of neck. There is no redness, warmth, or swelling of the joints. Gross motor strength is normal.  Right arm in a splint/wrap.    Neurologic: Awake, alert, oriented to name, place and time. Cranial nerves II-XII are grossly intact. Gait is normal.   Neuropsychiatric: Calm, cooperative. Normal affect.     Labs and EKG: not warranted for low risk procedure    EXAMINATION: XR HAND RT G/E 3 VW  1/2/2020 2:48 PM      CLINICAL HISTORY:  Right hand pain      COMPARISON: 12/24/2019     FINDINGS: AP, oblique and lateral views of the right hand were  obtained. Radiographs were compared to the prior examination dated  12/24/2019. The carpal bones are normally aligned. Joint spaces are  preserved     Redemonstration of the spiral fracture involving the ring finger  metacarpal shaft, the fracture is mildly displaced. No change in  alignment.                                                                      IMPRESSION:   1. Redemonstration of fourth metacarpal spiral fracture in unchanged  alignment with mild displacement.  2. Preserved joint spaces.    Outside records reviewed from: care everywhere    ASSESSMENT and PLAN  Erika Ziegler is a 34 year old female scheduled for ORIF of right 4th metacarpal on TBD by Dr. Kellogg in treatment of sprial fracture of right 4th metacarpal.  PAC referral for risk assessment and optimization for anesthesia:    Pre-operative considerations:  1.  Cardiac:  Functional status- METS >4.  Low risk surgery with 0.4% (RCRI 0) risk of major adverse cardiac event.   -denies CP, SOB, GARCIA, palpitation  -denies cardiac history    2.   Pulm:  Airway feasible.  NEETU risk: low  -never smoker    3.  GI:  Risk of PONV score = 3.  If > 2, anti-emetic intervention recommended.    VTE risk: 0.26%          Giana Ngo PA-C  Preoperative Assessment Center  [facility]  Phone: [phone]  Fax: [phone]

## 2020-01-07 ENCOUNTER — PRE VISIT (OUTPATIENT)
Dept: SURGERY | Facility: CLINIC | Age: 35
End: 2020-01-07

## 2020-01-07 ENCOUNTER — ANESTHESIA (OUTPATIENT)
Dept: SURGERY | Facility: AMBULATORY SURGERY CENTER | Age: 35
End: 2020-01-07

## 2020-01-07 ENCOUNTER — ANESTHESIA EVENT (OUTPATIENT)
Dept: SURGERY | Facility: AMBULATORY SURGERY CENTER | Age: 35
End: 2020-01-07

## 2020-01-07 ENCOUNTER — ANCILLARY PROCEDURE (OUTPATIENT)
Dept: RADIOLOGY | Facility: AMBULATORY SURGERY CENTER | Age: 35
End: 2020-01-07
Attending: ORTHOPAEDIC SURGERY
Payer: COMMERCIAL

## 2020-01-07 ENCOUNTER — HOSPITAL ENCOUNTER (OUTPATIENT)
Facility: AMBULATORY SURGERY CENTER | Age: 35
End: 2020-01-07
Attending: ORTHOPAEDIC SURGERY
Payer: COMMERCIAL

## 2020-01-07 VITALS
HEIGHT: 69 IN | BODY MASS INDEX: 22.66 KG/M2 | HEART RATE: 102 BPM | WEIGHT: 153 LBS | OXYGEN SATURATION: 99 % | RESPIRATION RATE: 16 BRPM | DIASTOLIC BLOOD PRESSURE: 78 MMHG | TEMPERATURE: 96.9 F | SYSTOLIC BLOOD PRESSURE: 114 MMHG

## 2020-01-07 DIAGNOSIS — G89.18 POST-OP PAIN: Primary | ICD-10-CM

## 2020-01-07 DIAGNOSIS — R52 PAIN: ICD-10-CM

## 2020-01-07 DEVICE — IMPLANTABLE DEVICE: Type: IMPLANTABLE DEVICE | Site: HAND | Status: FUNCTIONAL

## 2020-01-07 RX ORDER — FENTANYL CITRATE 50 UG/ML
25-50 INJECTION, SOLUTION INTRAMUSCULAR; INTRAVENOUS
Status: DISCONTINUED | OUTPATIENT
Start: 2020-01-07 | End: 2020-01-08 | Stop reason: HOSPADM

## 2020-01-07 RX ORDER — HYDROCODONE BITARTRATE AND ACETAMINOPHEN 5; 325 MG/1; MG/1
1 TABLET ORAL EVERY 6 HOURS PRN
Qty: 20 TABLET | Refills: 0 | Status: SHIPPED | OUTPATIENT
Start: 2020-01-07 | End: 2020-12-02

## 2020-01-07 RX ORDER — PROPOFOL 10 MG/ML
INJECTION, EMULSION INTRAVENOUS CONTINUOUS PRN
Status: DISCONTINUED | OUTPATIENT
Start: 2020-01-07 | End: 2020-01-07

## 2020-01-07 RX ORDER — PROPOFOL 10 MG/ML
INJECTION, EMULSION INTRAVENOUS PRN
Status: DISCONTINUED | OUTPATIENT
Start: 2020-01-07 | End: 2020-01-07

## 2020-01-07 RX ORDER — KETOROLAC TROMETHAMINE 30 MG/ML
INJECTION, SOLUTION INTRAMUSCULAR; INTRAVENOUS PRN
Status: DISCONTINUED | OUTPATIENT
Start: 2020-01-07 | End: 2020-01-07

## 2020-01-07 RX ORDER — KETAMINE HYDROCHLORIDE 10 MG/ML
INJECTION, SOLUTION INTRAMUSCULAR; INTRAVENOUS PRN
Status: DISCONTINUED | OUTPATIENT
Start: 2020-01-07 | End: 2020-01-07

## 2020-01-07 RX ORDER — SODIUM CHLORIDE, SODIUM LACTATE, POTASSIUM CHLORIDE, CALCIUM CHLORIDE 600; 310; 30; 20 MG/100ML; MG/100ML; MG/100ML; MG/100ML
INJECTION, SOLUTION INTRAVENOUS CONTINUOUS PRN
Status: DISCONTINUED | OUTPATIENT
Start: 2020-01-07 | End: 2020-01-07

## 2020-01-07 RX ORDER — CEFAZOLIN SODIUM 2 G/50ML
2 SOLUTION INTRAVENOUS
Status: COMPLETED | OUTPATIENT
Start: 2020-01-07 | End: 2020-01-07

## 2020-01-07 RX ORDER — FLUMAZENIL 0.1 MG/ML
0.2 INJECTION, SOLUTION INTRAVENOUS
Status: DISCONTINUED | OUTPATIENT
Start: 2020-01-07 | End: 2020-01-08 | Stop reason: HOSPADM

## 2020-01-07 RX ORDER — ONDANSETRON 2 MG/ML
INJECTION INTRAMUSCULAR; INTRAVENOUS PRN
Status: DISCONTINUED | OUTPATIENT
Start: 2020-01-07 | End: 2020-01-07

## 2020-01-07 RX ORDER — CEFAZOLIN SODIUM 1 G/50ML
1 SOLUTION INTRAVENOUS SEE ADMIN INSTRUCTIONS
Status: DISCONTINUED | OUTPATIENT
Start: 2020-01-07 | End: 2020-01-08 | Stop reason: HOSPADM

## 2020-01-07 RX ORDER — NALOXONE HYDROCHLORIDE 0.4 MG/ML
.1-.4 INJECTION, SOLUTION INTRAMUSCULAR; INTRAVENOUS; SUBCUTANEOUS
Status: DISCONTINUED | OUTPATIENT
Start: 2020-01-07 | End: 2020-01-08 | Stop reason: HOSPADM

## 2020-01-07 RX ORDER — BUPIVACAINE HYDROCHLORIDE AND EPINEPHRINE 5; 5 MG/ML; UG/ML
INJECTION, SOLUTION PERINEURAL PRN
Status: DISCONTINUED | OUTPATIENT
Start: 2020-01-07 | End: 2020-01-07

## 2020-01-07 RX ORDER — GLYCOPYRROLATE 0.2 MG/ML
INJECTION, SOLUTION INTRAMUSCULAR; INTRAVENOUS PRN
Status: DISCONTINUED | OUTPATIENT
Start: 2020-01-07 | End: 2020-01-07

## 2020-01-07 RX ORDER — LIDOCAINE HYDROCHLORIDE 10 MG/ML
INJECTION, SOLUTION INFILTRATION; PERINEURAL PRN
Status: DISCONTINUED | OUTPATIENT
Start: 2020-01-07 | End: 2020-01-07 | Stop reason: HOSPADM

## 2020-01-07 RX ADMIN — SODIUM CHLORIDE, SODIUM LACTATE, POTASSIUM CHLORIDE, CALCIUM CHLORIDE: 600; 310; 30; 20 INJECTION, SOLUTION INTRAVENOUS at 15:16

## 2020-01-07 RX ADMIN — CEFAZOLIN SODIUM 2 G: 2 SOLUTION INTRAVENOUS at 15:20

## 2020-01-07 RX ADMIN — FENTANYL CITRATE 50 MCG: 50 INJECTION, SOLUTION INTRAMUSCULAR; INTRAVENOUS at 15:01

## 2020-01-07 RX ADMIN — BUPIVACAINE HYDROCHLORIDE AND EPINEPHRINE 20 ML: 5; 5 INJECTION, SOLUTION PERINEURAL at 15:10

## 2020-01-07 RX ADMIN — PROPOFOL 50 MG: 10 INJECTION, EMULSION INTRAVENOUS at 15:45

## 2020-01-07 RX ADMIN — GLYCOPYRROLATE 0.2 MG: 0.2 INJECTION, SOLUTION INTRAMUSCULAR; INTRAVENOUS at 15:53

## 2020-01-07 RX ADMIN — KETOROLAC TROMETHAMINE 30 MG: 30 INJECTION, SOLUTION INTRAMUSCULAR; INTRAVENOUS at 16:37

## 2020-01-07 RX ADMIN — PROPOFOL: 10 INJECTION, EMULSION INTRAVENOUS at 16:11

## 2020-01-07 RX ADMIN — KETAMINE HYDROCHLORIDE 20 MG: 10 INJECTION, SOLUTION INTRAMUSCULAR; INTRAVENOUS at 15:52

## 2020-01-07 RX ADMIN — ONDANSETRON 4 MG: 2 INJECTION INTRAMUSCULAR; INTRAVENOUS at 15:26

## 2020-01-07 RX ADMIN — PROPOFOL 100 MCG/KG/MIN: 10 INJECTION, EMULSION INTRAVENOUS at 15:26

## 2020-01-07 RX ADMIN — PROPOFOL 30 MG: 10 INJECTION, EMULSION INTRAVENOUS at 15:26

## 2020-01-07 ASSESSMENT — MIFFLIN-ST. JEOR: SCORE: 1458.38

## 2020-01-07 ASSESSMENT — LIFESTYLE VARIABLES: TOBACCO_USE: 0

## 2020-01-07 NOTE — ANESTHESIA PROCEDURE NOTES
Peripheral Nerve Block Procedure Note    Staff:     Anesthesiologist:  Jeffrey Arteaga DO  Location: Pre-op  Procedure Start/Stop TImes:      1/7/2020 3:05 PM     1/7/2020 3:10 PM    patient identified, IV checked, site marked, risks and benefits discussed, informed consent, monitors and equipment checked, pre-op evaluation, at physician/surgeon's request and post-op pain management      Correct Patient: Yes      Correct Position: Yes      Correct Site: Yes      Correct Procedure: Yes      Correct Laterality:  Yes    Site Marked:  Yes  Procedure details:     Procedure:  Supraclavicular    ASA:  2    Diagnosis:  Post op pain    Laterality:  Right    Position:  Supine    Sterile Prep: chloraprep, mask and sterile gloves      Local skin infiltration:  2% lidocaine    amount (mL):  2    Needle:  Short bevel and insulated    Needle gauge:  21    Needle length (inches):  4    Ultrasound: Yes      Ultrasound used to identify targeted nerve, plexus, or vascular structure and placed a needle adjacent to it      Permanent Image entered into patiient's record      Abnormal pain on injection: No      Blood Aspirated: No      Paresthesias:  No    Bleeding at site: No      Bolus via:  Needle    Complications:  None  Assessment/Narrative:      Informed consent obtained.  All risks and benefits of the nerve block discussed with the patient.  All questions answered and all parties agreed with the plan.   Block was placed at the surgeon's request for post operative pain control.  Discussed with Patient Off-Label use of Liposomal Bupivacaine (Exparel) for Nerve Block.    Relevant risks & benefits were discussed with patient.    All questions were answered and there was agreement to proceed.    Patient signed Off-Label Use of Exparel Consent Form.      Exparel 10mL (133mg) given in the block

## 2020-01-07 NOTE — ANESTHESIA PREPROCEDURE EVALUATION
Anesthesia Pre-Procedure Evaluation    Patient: Erika Ziegler   MRN:     [id number] Gender:   female   Age:    34 year old :      1985        Preoperative Diagnosis: Closed displaced fracture of base of fourth metacarpal bone of right hand, initial encounter [S62.314A]   Procedure(s):  OPEN REDUCTION INTERNAL FIXATION, FRACTURE, HAND     Past Medical History:   Diagnosis Date     Abnormal Pap smear      Cervix cancer (H) 2013    neuroendocrine     Hx of previous reproductive problem     Due to cancer treatment      Past Surgical History:   Procedure Laterality Date     BIOPSY       EXAM UNDER ANESTHESIA, INSERT JOESPH SLEEVE, UTERINE PLACEMENT OF TANDEM AND RING FOR RAD, ULTRASOUND  2013    Procedure: EXAM UNDER ANESTHESIA, INSERT JOESPH SLEEVE, UTERINE PLACEMENT OF TANDEM AND RING FOR RADIATION, ULTRASOUND GUIDED;  Pelvic Exam, Insert JOESPH Sleeve with Ultrasound Guidance and Place Tandem Ring for High Dose Radiation;  Surgeon: Roxy Brar MD;  Location: [facility] OR     LAPAROSCOPIC SALPINGO-OOPHORECTOMY  2013    Procedure: LAPAROSCOPIC SALPINGO-OOPHORECTOMY;  Laparoscopy, Left  salpingectomy,Ovarian Transposition, Right Salpingo Oophorectomy , Anesthesia General with block;  Surgeon: Deanna Salinas MD;  Location: [facility] OR     wisdom teeth            Anesthesia Evaluation     . Pt has had prior anesthetic.     No history of anesthetic complications          ROS/MED HX    ENT/Pulmonary:      (-) tobacco use, asthma, sleep apnea and recent URI   Neurologic:  - neg neurologic ROS     Cardiovascular:  - neg cardiovascular ROS   (+) ----. : . . . :. . No previous cardiac testing      (-) syncope   METS/Exercise Tolerance:  >4 METS   Hematologic:  - neg hematologic  ROS      (-) history of blood clots and History of Transfusion   Musculoskeletal: Comment: H/o right knee dislocation  (+) fracture upper extremity,  -       GI/Hepatic:  - neg GI/hepatic ROS       Renal/Genitourinary:  - ROS  Renal section negative       Endo:  - neg endo ROS       Psychiatric:     (+) psychiatric history anxiety and depression      Infectious Disease:  - neg infectious disease ROS      (-) Recent Fever   Malignancy:   (+) Malignancy History of Other  Other CA status post Surgery         Other:    (+) No chance of pregnancy no H/O Chronic Pain,                       PHYSICAL EXAM:   Mental Status/Neuro: A/A/O   Airway: Facies: Feasible  Mallampati: I  Mouth/Opening: Full  TM distance: > 6 cm  Neck ROM: Full   Respiratory: Auscultation: CTAB     Resp. Rate: Normal     Resp. Effort: Normal      CV: Rhythm: Regular  Rate: Age appropriate  Heart: Normal Sounds  Edema: None   Comments:      Dental: Normal Dentition                LABS:  CBC:   Lab Results   Component Value Date    WBC 3.9 (L) 04/04/2015    WBC 3.6 (L) 07/16/2014    HGB 14.2 04/04/2015    HGB 13.8 07/16/2014    HCT 41.9 04/04/2015    HCT 40.8 07/16/2014     04/04/2015     07/16/2014     BMP:   Lab Results   Component Value Date     04/04/2015     10/29/2013    POTASSIUM 3.6 04/04/2015    POTASSIUM 3.7 10/29/2013    CHLORIDE 108 04/04/2015    CHLORIDE 105 10/29/2013    CO2 27 04/04/2015    CO2 27 10/29/2013    BUN 8 04/04/2015    BUN 15 10/29/2013    CR 0.74 04/04/2015    CR 0.66 10/29/2013    GLC 88 04/04/2015    GLC 94 10/29/2013     COAGS:   Lab Results   Component Value Date    INR <0.9 07/16/2014     POC:   Lab Results   Component Value Date    BGM 78 12/16/2013    HCG Negative 06/24/2013    HCGS Negative 05/16/2013     OTHER:   Lab Results   Component Value Date    LACT 1.2 04/04/2015    LEWIS 9.0 04/04/2015    MAG 1.7 04/04/2015    ALBUMIN 4.1 04/04/2015    PROTTOTAL 7.6 04/04/2015    ALT 45 04/04/2015    AST 22 04/04/2015    ALKPHOS 43 04/04/2015    BILITOTAL 0.3 04/04/2015    LIPASE 128 04/04/2015        Preop Vitals    BP Readings from Last 3 Encounters:   01/07/20 139/89   01/06/20 (!) 145/89   12/04/19 128/84    Pulse  "Readings from Last 3 Encounters:   01/07/20 74   01/06/20 84   12/04/19 74      Resp Readings from Last 3 Encounters:   01/07/20 16   01/06/20 16   09/03/19 14    SpO2 Readings from Last 3 Encounters:   01/07/20 98%   01/06/20 99%   09/03/19 97%      Temp Readings from Last 1 Encounters:   01/07/20 36.8  C (98.3  F) (Tympanic)    Ht Readings from Last 1 Encounters:   01/07/20 1.753 m (5' 9\")      Wt Readings from Last 1 Encounters:   01/07/20 69.4 kg (153 lb)    Estimated body mass index is 22.59 kg/m  as calculated from the following:    Height as of this encounter: 1.753 m (5' 9\").    Weight as of this encounter: 69.4 kg (153 lb).     LDA:  Peripheral IV 12/16/13 Right (Active)   Number of days: 2213       Peripheral IV 07/16/14 Right Lower forearm (Active)   Number of days: 2001       Peripheral IV 08/13/18 Right (Active)   Number of days: 512       Rectal Tube With balloon (Active)   Number of days: 2388        Assessment:   ASA SCORE: 2    H&P: History and physical reviewed and following examination; no interval change.   Smoking Status:  Non-Smoker/Unknown   NPO Status: NPO Appropriate     Plan:   Anes. Type:  MAC; Peripheral Nerve Block; For Post-op pain in coordination with surgeon     Block Details: Single Shot; Exparel; Supraclav.   Pre-Medication: None   Induction:  N/a   Airway: Native Airway   Access/Monitoring: PIV   Maintenance: N/a     Postop Plan:   Postop Pain: None  Postop Sedation/Airway: Not planned  Disposition: Outpatient     PONV Management:   Adult Risk Factors: Female, Non-Smoker   Prevention: Ondansetron     CONSENT: Direct conversation   Plan and risks discussed with: Patient   Blood Products: Consent Deferred (Minimal Blood Loss)                   Jeffrey Arteaga DO  "

## 2020-01-07 NOTE — ANESTHESIA POSTPROCEDURE EVALUATION
Anesthesia POST Procedure Evaluation    Patient: Erika Ziegler   MRN:     [id number] Gender:   female   Age:    34 year old :      1985        Preoperative Diagnosis: Closed displaced fracture of base of fourth metacarpal bone of right hand, initial encounter [S62.314A]   Procedure(s):  OPEN REDUCTION INTERNAL FIXATION, FRACTURE, HAND   Postop Comments: No value filed.       Anesthesia Type:  Not documented  MAC, Peripheral Nerve Block, For Post-op pain in coordination with surgeon    Reportable Event: NO     PAIN: Uncomplicated   Sign Out status: Comfortable, Well controlled pain     PONV: No PONV   Sign Out status:  No Nausea or Vomiting     Neuro/Psych: Uneventful perioperative course   Sign Out Status: Preoperative baseline; Age appropriate mentation     Airway/Resp.: Uneventful perioperative course   Sign Out Status: Non labored breathing, age appropriate RR; Resp. Status within EXPECTED Parameters     CV: Uneventful perioperative course   Sign Out status: Appropriate BP and perfusion indices; Appropriate HR/Rhythm     Disposition:   Sign Out in:  PACU  Disposition:  Phase II; Home  Recovery Course: Uneventful  Follow-Up: Not required           Last Anesthesia Record Vitals:  CRNA VITALS  2020 1620 - 2020 1705      2020             Pulse:  100    SpO2:  100 %          Last PACU Vitals:  Vitals Value Taken Time   /78 2020  4:54 PM   Temp 36.1  C (96.9  F) 2020  4:54 PM   Pulse 102 2020  4:54 PM   Resp 16 2020  4:54 PM   SpO2 99 % 2020  4:54 PM   Temp src     NIBP 122/73 2020  4:46 PM   Pulse 100 2020  4:50 PM   SpO2 100 % 2020  4:50 PM   Resp     Temp     Ht Rate 90 2020  4:48 PM   Temp 2           Electronically Signed By: Jeffrey Arteaga DO, 2020, 5:05 PM

## 2020-01-07 NOTE — DISCHARGE INSTRUCTIONS
"Information about liposomal bupivacaine (Exparel)    What is Liposomal Bupivacaine?    Liposomal Bupivacaine is a numbing medication that can help you manage your pain after surgery.  This medication is similar to \"novacaine,\" which is often used by the dentist.  Liposomal bupivacaine is released slowly and can help control pain for up to 72 hours.    What is the purpose of Liposomal Bupivacaine?    To manage your pain after surgery    To help you sleep better, take deep breaths, walk more comfortable, and feel up to visiting with others    How is the procedure done?    Liposomal bupivacaine is a medication given by an injection.    It is usually given right before your surgery.  If this is the case, you will be awake or sedated, but you should experience minimal pain during the procedure.    For some people, the injection may be given at the very end of your surgery.  It all depends on the type of surgery and your situation.    The procedure usually takes about 5-15 minutes.  An ultrasound machine will help the anesthesiologist insert it in the right place or the surgeon will inject it under direct vision.     A needle is used to place the numbing medication under your skin.  It provides pain relief by numbing the tissue in the area where your surgeon will make the incision.    What can I expect?    You may experience numbness, tingling, or a feeling of heaviness around the area that was injected.    If you experience any of the follow symptoms IMMEDIATELY CALL THE REGIONAL ANESTHESIA PAIN SERVICE:    Numbness or tingling occurs in areas other than around the injection site    Blurry vision    Ringing in your ears    A metallic taste in your mouth    PAGE: Dial [phone].  When prompted, enter the following 4-digit ID number:  [id number].  You will be prompted to enter your phone number; and then enter the # sign.  The clinician on call will call you back.    OR    CALL: Dial [phone].  Let the hospital  " know that you are having a problem with a nerve block and that you would like to speak to the regional anesthesia pain service right away.    You should not receive any other type of numbing medication within 4 days after receiving liposomal bupivacaine unless your anesthesiologist approves.      Post Operative Instructions: Regional Anesthetic for Upper Extremity with Liposomal Bupivacaine  General Information:   Regional anesthesia is when local anesthetic or  numbing  medication is injected around the nerves to anesthetize or  numb  the area supplied by that set of nerves. It is a type of analgesia used to control pain and decreases the need for narcotics following surgery.    Types of Regional Blocks:  Interscalene: A block injected into the neck on the operative shoulder/arm of a patient having shoulder surgery  Supraclavicular: A block injected near the clavicle on the operative shoulder of a patient having elbow, forearm, or hand surgery    Procedure:  The type of anesthesia your doctor used to numb your shoulder or arm will usually not start to wear off for 24-48 hours, but may last as long as 72 hours. You should be careful during that period, since it is possible to injure your arm without being aware of the injury. While your arm is numb, you should:    Avoid striking or bumping your arm    Avoid extreme hot or cold    Diet:  There are no restrictions on your diet. You should drink plenty of fluids.     Discomfort:  You will have a tingling and prickly sensation in your arm as the feeling begins to return. You can also expect some discomfort. The amount of discomfort is unpredictable, but if you have more pain than can be controlled with pain medication you should notify your physician.     Pain Medications:  Begin taking your oral pain pills before bedtime and during the night to avoid a sudden onset of pain as part of the block wears off.  Do not engage in drinking, driving, or hazardous occupations  "while taking pain medication.     Stitches:   You may have stitches or special skin closures. You doctor will inform you when to return to the office to have them removed.     Activity:  On the day of surgery you should try to stay in bed with your hand elevated on pillows. You may resume your normal activity after that, wearing a sling for comfort. Contact your physician if you have any of the problems:     Continued numbness or tingling in the arm or hand after 72 hours    Swelling of the fingers or fingers that are cold to the touch    Excessive bleeding or drainage    Severe pain  [facility]  Home Care Following Anesthesia  For 24 hours after surgery:  1. Get plenty of rest.  A responsible adult must stay with you for at least 24 hours after you leave the surgery center.  2. Do not drive or use heavy equipment.  If you have weakness or tingling, don't drive or use heavy equipment until this feeling goes away.   3. Do not drink alcohol.   4. Avoid strenuous or risky activities.  Ask for help when climbing stairs.  5. You may feel lightheaded.  IF so, sit for a few minutes before standing.  Have someone help you get up.   6. If you have nausea (feel sick to your stomach): Drink only clear liquids such as apple juice, ginger ale, broth or 7-Up.  Rest may also help.  Be sure to drink enough fluids.  Move to a regular diet as you feel able.   7. You may have a slight fever.  Call the doctor if your fever is over 100 F (37.7 C) (taken under the tongue) or lasts longer than 24 hours.  8. You may have a dry mouth, a sore throat, muscle aches or trouble sleeping. These should go away after 24 hours.  9. Do not make important or legal decisions.        Today you received an Exparel block to numb the nerves near your surgery site.  This is a block using local anesthetic or \"numbing\" medication injected around the nerves to anesthetize or \"numb\" the area supplied by those nerves.  This " block is injected into the muscle layer near your surgical site.  This medication may numb the location where you had surgery up to 72 hours.  If your surgical site is an arm or leg you should be careful with your affected limb, since it is possible to injure your limb without being aware of it due to the numbing.  Until full feeling returns, you should guard against bumping or hitting your limb, and avoid extreme hot or cold temperatures on the skin.  As the block wears off, the feeling will return as a tingling or prickly sensation near your surgical site.  You will experince more discomfort from your incision as the feeling returns.  You may want to take a pain pill (a narcotic or Tylenol if this was prescribed by your surgeon) when you start to experience mild pain before the pain beomes more severe.  If your pain medications do not control your pain, you should notify your surgeon.    Tips for taking pain medications  To get the best pain relief possible, remember these points:    Take pain medications as directed, before pain becomes severe.    Pain medication can upset your stomach: taking it with food may help.    Constipation is a common side effect of pain medication. Drink plenty of  fluids.    Eat foods high in fiber. Take a stool softener if recommended by your doctor or pharmacist.    Do not drink alcohol, drive or operate machinery while taking pain medications.    Ask about other ways to control pain, such as with heat, ice or relaxation.    Tylenol/Acetaminophen Consumption  To help encourage the safe use of acetaminophen, the makers of TYLENOL  have lowered the maximum daily dose for single-ingredient Extra Strength TYLENOL  (acetaminophen) products sold in the [place] from 8 pills per day (4,000 mg) to 6 pills per day (3,000 mg). The dosing interval has also changed from 2 pills every 4-6 hours to 2 pills every 6 hours.    If you feel your pain relief is insufficient, you may take  Tylenol/Acetaminophen in addition to your narcotic pain medication.     Be careful not to exceed 3,000 mg of Tylenol/Acetaminophen in a 24 hour period from all sources.    If you are taking extra strength Tylenol/acetaminophen (500 mg), the maximum dose is 6 tablets in 24 hours.    If you are taking regular strength acetaminophen (325 mg), the maximum dose is 9 tablets in 24 hours.    Call a doctor for any of the followin. Signs of infection (fever, growing tenderness at the surgery site, a large amount of drainage or bleeding, severe pain, foul-smelling drainage, redness, swelling).  2. It has been over 8 to 10 hours since surgery and you are still not able to urinate (pass water).  3. Headache for over 24 hours.  Your doctor is:       Dr. Luis Kellogg, Orthopaedics: [phone]               Or dial [phone] and ask for the resident on call for:  Orthopaedics  For emergency care, call the:  [facility] Emergency Department: [phone] (TTY for hearing impaired: [phone])

## 2020-01-07 NOTE — ANESTHESIA CARE TRANSFER NOTE
Patient: Erika Ziegler    Procedure(s):  OPEN REDUCTION INTERNAL FIXATION, FRACTURE, HAND    Diagnosis: Closed displaced fracture of base of fourth metacarpal bone of right hand, initial encounter [S62.314A]  Diagnosis Additional Information: No value filed.    Anesthesia Type:   MAC, Peripheral Nerve Block, For Post-op pain in coordination with surgeon     Note:  Airway :Room Air  Patient transferred to:Phase II  Comments: 114/78  99%  96.9-98-16  Handoff Report: Identifed the Patient, Identified the Reponsible Provider, Reviewed the pertinent medical history, Discussed the surgical course, Reviewed Intra-OP anesthesia mangement and issues during anesthesia, Set expectations for post-procedure period and Allowed opportunity for questions and acknowledgement of understanding      Vitals: (Last set prior to Anesthesia Care Transfer)    CRNA VITALS  1/7/2020 1620 - 1/7/2020 1656      1/7/2020             Pulse:  100    SpO2:  100 %                Electronically Signed By: AMOL Armijo CRNA  January 7, 2020  4:56 PM

## 2020-01-08 NOTE — OP NOTE
Preoperative diagnosis: right ring finger metacarpal shaft fracture     Postoperative diagnosis:right ring finger metacarpal shaft fracture        Procedure performed: Open reduction internal fixation right  ring metacarpal shaft fracture      Surgeon: Luis Kellogg MD      Assistant: Chago Lindsey PGY4      Anesthesia: MAC + Regional      Implants: 2.0  mm plate from [employer]  mini fragment set plus single 1.5 mm screw from [employer] mod hand set       Indications for surgery: This is a 34 year old year old year old female  who sustained the above injury. Treatment options were dicussed with patient and the patient wished to proceed with the above surgery. Risk of surgery were discussed   including   infection, bleeding, malunion, nonunion, malalignment,  stiffness, damage to nerves, blood vessels, or  other nearby structures, hardware failure or breakage, and need for further surgery. The patient expressed understanding and consented to proceed.      Findings:  Spiral fracture of metacarpal shaft     Details of procedure: The patient was identified in the preoperative area. The surgical site was marked. A block was performed by janeen. The patient was brought back to the operating room and placed supine on the operating room table. Sedation was induced. The patient was positioned with the surgical extremity over a hand table. A tourniquet was placed about the upper arm. Preoperative antibiotics were given.  The surgical extremity was prepped and draped in the usual sterile fashion. A formal timeout was performed identifying the patient, site of surgery, and procedure to be performed. The arm was exsanguinated and the tourniquet inflated to 250 mmHg. A longitudinal incision was made overlying the ring   finger metacarpal. Superficial veins were divided as needed and the extensor tendon were retracted and protected. Dissection was taken down to the metacarpal shaft which was dissected subperiosteally, exposing  the fracture site. The fracture site was cleaned with a curette and dental pick. The fracture was then reduced and the reduction held with a clamp. A 1.5 mm lag screw was placed across the fracture, securing our reduction. This was countersunk prior to placement. Rotation was checked and was good. A 1.5 mm plate was then selected but could not sit flush to the bone due to the screw position. Therefore we decided to use a larger 2.0 mm plate from the mini fragment set. This still could not fit over the screw so the screw as removed, the plate was placed over the bone, the reduction was held with a clamp, and the lag screw was placed back in position, through one of the holes in the plate. The distal holes in the plate were then filled with 3 nonlocking screws and the proximal holes were filled with 2 nonlocking screws. All had good bite. One screw was too long so was witched out for a shorter screw.     Multiple x-rays were obtained confirming acceptable plate position and alignment. Alignment was also checked grossly and was excellent. There was no rotational deformity.  Periosteum was then closed over the plate with 3-0 vicryl    Wound was irrigated copiously. Tourniquet was taken down and hemostasis achieved. Skin was closed with 3-0 vicryl and 4-0 monocryl. A sterile dressing was applied of adaptic, 4 x 4's and sterile cast padding. The patient was then placed in a ulnar gutter resting plaster splint.  The patient was then awoken and brought to the recovery area in stable condition.     Plan: Follow-up in one week for x-rays and to begin hand therapy.

## 2020-01-09 DIAGNOSIS — S62.314A CLOSED DISPLACED FRACTURE OF BASE OF FOURTH METACARPAL BONE OF RIGHT HAND, INITIAL ENCOUNTER: Primary | ICD-10-CM

## 2020-01-15 ENCOUNTER — OFFICE VISIT (OUTPATIENT)
Dept: ORTHOPEDICS | Facility: CLINIC | Age: 35
End: 2020-01-15
Payer: COMMERCIAL

## 2020-01-15 ENCOUNTER — THERAPY VISIT (OUTPATIENT)
Dept: OCCUPATIONAL THERAPY | Facility: CLINIC | Age: 35
End: 2020-01-15
Payer: COMMERCIAL

## 2020-01-15 ENCOUNTER — ANCILLARY PROCEDURE (OUTPATIENT)
Dept: GENERAL RADIOLOGY | Facility: CLINIC | Age: 35
End: 2020-01-15
Attending: ORTHOPAEDIC SURGERY
Payer: COMMERCIAL

## 2020-01-15 DIAGNOSIS — S62.314A CLOSED DISPLACED FRACTURE OF BASE OF FOURTH METACARPAL BONE OF RIGHT HAND, INITIAL ENCOUNTER: ICD-10-CM

## 2020-01-15 DIAGNOSIS — S62.314A CLOSED DISPLACED FRACTURE OF BASE OF FOURTH METACARPAL BONE OF RIGHT HAND, INITIAL ENCOUNTER: Primary | ICD-10-CM

## 2020-01-15 DIAGNOSIS — M79.641 RIGHT HAND PAIN: Primary | ICD-10-CM

## 2020-01-15 DIAGNOSIS — Z47.89 AFTERCARE FOLLOWING SURGERY OF THE MUSCULOSKELETAL SYSTEM: ICD-10-CM

## 2020-01-15 PROCEDURE — 97760 ORTHOTIC MGMT&TRAING 1ST ENC: CPT | Mod: GO | Performed by: OCCUPATIONAL THERAPIST

## 2020-01-15 PROCEDURE — 97110 THERAPEUTIC EXERCISES: CPT | Mod: GO | Performed by: OCCUPATIONAL THERAPIST

## 2020-01-15 PROCEDURE — 97165 OT EVAL LOW COMPLEX 30 MIN: CPT | Mod: GO | Performed by: OCCUPATIONAL THERAPIST

## 2020-01-15 NOTE — NURSING NOTE
Reason For Visit:   Chief Complaint   Patient presents with     RECHECK     DOS 1/7/2020 ORIF right ring finger metacarpal fracture       Primary MD: Saritha Rodney    Age: 34 year old    ?  No      There were no vitals taken for this visit.      Pain Assessment  Patient Currently in Pain: Denies(Only if she moves it)               QuickDASH Assessment  No flowsheet data found.       Current Outpatient Medications   Medication Sig Dispense Refill     acetaminophen (TYLENOL) 500 MG tablet Take 500-1,000 mg by mouth every 6 hours as needed for mild pain (PT last dose 1.6.2020)       ibuprofen (ADVIL/MOTRIN) 200 MG tablet Take 200-400 mg by mouth as needed (PT last dose 1.6.2020)        norgestrel-ethinyl estradiol (ELINEST) 0.3-30 MG-MCG tablet Take 1 tablet by mouth daily (Patient taking differently: Take 1 tablet by mouth At Bedtime ) 28 tablet 11     HYDROcodone-acetaminophen (NORCO) 5-325 MG tablet Take 1 tablet by mouth every 6 hours as needed for breakthrough pain (Patient not taking: Reported on 1/15/2020) 20 tablet 0       No Known Allergies    Ibeth Lindo ATC

## 2020-01-15 NOTE — LETTER
1/15/2020       RE: Erika Ziegler  [address]  [place] [facility] [address]     Dear Colleague,    Thank you for referring your patient, Erika Ziegler, to the [facility] at [facility]. Please see a copy of my visit note below.    [facility]  Orthopedics  Luis Kellogg MD  01/15/2020     Name: Erika Ziegler  MRN: [id number]  Age: 34 year old  : 1985  Referring provider: Nick Kaplan     Chief Complaint: RECHECK (DOS 2020 ORIF right ring finger metacarpal fracture)    Date of Surgery: 2020    Procedure: Open reduction internal fixation right ring metacarpal shaft fracture     History of Present Illness:   Erika Ziegler is a 34 year old female 1 week status-post the above procedure who presents for postoperative evaluation. She reports that she has been feeling well and has been managing her pain. She has been off of her pain medication since Saturday. Notes that she had some numbness and tingling when she came home with her nerve block that bothered her slightly but this is since resolved.  No further concerns at this time.    Physical Examination:  General: Healthy appearing female. Affect appropriate. Normal gait. Alert and oriented to surroundings.   Right Upper Extremity:     Sensation intact  Incision well approximated  No erythema or drainage  Fingers are well aligned including rotationally  Fingers well-perfused      Radiographs:   Radiographs of the Right Hand - 3 views (01/15/2020):  Well aligned fourth metacarpal shaft fracture with plate and screws.    I have independently reviewed the above imaging studies; the results were discussed with the patient.     Assessment:   34 year old female with 1 week s/p the above procedure; recovering as expected.     Plan:   1. Begin hand therapy for custom splint to include wrist and metacarpals, leave MCPs and IP is free.  Begin gentle active range of motion of all joints.   Wear splint at all times except exercise and hygiene.  2. Okay to get wound wet but avoid prolonged soaking   3. Follow up in 1 month and anticipate that splint will be discontinued at that time.     Luis Kellogg MD      Scribe Disclosure:  I, Humberto Hasitngs, am serving as a scribe to document services personally performed by Luis Kellogg MD at this visit, based upon the provider's statements to me. All documentation has been reviewed by the aforementioned provider prior to being entered into the official medical record.        Again, thank you for allowing me to participate in the care of your patient.      Sincerely,    Luis Kellogg MD

## 2020-01-15 NOTE — PROGRESS NOTES
[facility]  Orthopedics  Luis Kellogg MD  01/15/2020     Name: Erika Ziegler  MRN: [id number]  Age: 34 year old  : 1985  Referring provider: Nick Kaplan     Chief Complaint: RECHECK (DOS 2020 ORIF right ring finger metacarpal fracture)    Date of Surgery: 2020    Procedure: Open reduction internal fixation right ring metacarpal shaft fracture     History of Present Illness:   Erika Ziegler is a 34 year old female 1 week status-post the above procedure who presents for postoperative evaluation. She reports that she has been feeling well and has been managing her pain. She has been off of her pain medication since Saturday. Notes that she had some numbness and tingling when she came home with her nerve block that bothered her slightly but this is since resolved.  No further concerns at this time.    Physical Examination:  General: Healthy appearing female. Affect appropriate. Normal gait. Alert and oriented to surroundings.   Right Upper Extremity:     Sensation intact  Incision well approximated  No erythema or drainage  Fingers are well aligned including rotationally  Fingers well-perfused      Radiographs:   Radiographs of the Right Hand - 3 views (01/15/2020):  Well aligned fourth metacarpal shaft fracture with plate and screws.    I have independently reviewed the above imaging studies; the results were discussed with the patient.     Assessment:   34 year old female with 1 week s/p the above procedure; recovering as expected.     Plan:   1. Begin hand therapy for custom splint to include wrist and metacarpals, leave MCPs and IP is free.  Begin gentle active range of motion of all joints.  Wear splint at all times except exercise and hygiene.  2. Okay to get wound wet but avoid prolonged soaking   3. Follow up in 1 month and anticipate that splint will be discontinued at that time.     Luis Kellogg MD      Scribe Disclosure:  Humberto RAY, am serving as a scribe to  document services personally performed by Luis Kellogg MD at this visit, based upon the provider's statements to me. All documentation has been reviewed by the aforementioned provider prior to being entered into the official medical record.

## 2020-01-15 NOTE — PROGRESS NOTES
[facility] Hand Therapy Initial Evaluation     Current Date: 1/15/2020    Diagnosis: Right ring finger metacarpal shaft fracture   DOI: 12/24/2020  DOS: 1/7/2020  Procedure: Open reduction internal fixation right ring metacarpal shaft fracture  Post: 1 w 1 d  Referring physician: Luis Kellogg MD    Subjective:    Erika Ziegler being seen for a right ring finger metacarpal fracture.   Problem began 12/24/2019. Where condition occurred: at home.Problem occurred: tripped  and reported as 3/10 on pain scale.  Pertinent medical history includes:  Cancer.    Surgeries include:  Orthopedic surgery and cancer surgery.  Current medications:  Hormone replacement therapy.   Primary job tasks include:  Computer work, driving and lifting/carrying.  Pain is described as aching and is constant. Pain is worse during the day, worse during the night and the same all the time. Since onset symptoms are gradually improving. Special tests:  X-ray. Previous treatment includes surgery. There was mild improvement following previous treatment.   Patient is support . Restrictions include:  Working in normal job with restrictions.    Barriers include:  Requires assistance with ADL's and other.  Red flags:  None as reported by patient.        Occupational Profile Information:  Left hand dominant  Prior functional level:  no limitations  Patient reports symptoms of pain, stiffness/loss of motion, weakness/loss of strength and edema  Barriers include:none  Mobility: No difficulty  Transportation: drives  Occupation: [employer], mostly typing, leads support groups; has two children ages 4 years and 5 months  Leisure activities/hobbies: Yoga, cooking  Other: Has a voice to text head set    Functional Outcome Measure:   Upper Extremity Functional Index Score:  SCORE: 43/80   (A lower score indicates greater disability.)          Objective:  Pain Level (Scale 0-10)   1/15/2020   At Rest 0/10   With Use 3/10      Pain Description  Date 1/15/2020   Location Ring finger   Pain Quality Aching and Sharp   Frequency intermittent     Pain is worst  daytime   Exacerbated by  Hand movemnt   Relieved by cold and rest   Progression Gradually improving     Wound/Scar  Healing appropriately, sutures removed. Steri-strips intact. Mild ecchymosis throughout hand.      Edema (Circumference measured in cm)   1/15/2020 1/15/2020   Ring Left Right   P1 5.7 5.8   PIP 5.2 5.9   P2 4.5 5.4   '    Sensation   WNL throughout all nerve distributions; per patient report    ROM  Hand 1/15/2020   AROM(PROM) Right   Ring MP 0/28   PIP -15/63   DIP 0/39   BRITO 115   Small MP 0/20   PIP -20/75   DIP 0/45   BRITO 120       Strength  Contraindicated at this time    Assessment:  Patient presents with symptoms consistent with diagnosis of the above condition,  with surgical  intervention.     Patient's limitations or Problem List includes:  Pain, Decreased ROM/motion, Increased edema, Decreased  and Decreased pinch of the right hand which interferes with the patient's ability to perform Self Care Tasks (dressing, eating, bathing, hygiene/toileting), Work Tasks, Sleep Patterns, Recreational Activities, Household Chores and Driving  as compared to previous level of function.    Rehab Potential:  Excellent - Return to full activity, no limitations    Patient will benefit from skilled Occupational Therapy to increase ROM,  strength and pinch strength and decrease pain, edema and adherence of scarring to return to previous activity level and resume normal daily tasks and to reach their rehab potential.    Barriers to Learning:  No barrier    Communication Issues:  Patient appears to be able to clearly communicate and understand verbal and written communication and follow directions correctly.    Chart Review: Chart Review    Identified Performance Deficits: bathing/showering, toileting, dressing, feeding, hygiene and grooming, child rearing, driving and  community mobility, home establishment and management, meal preparation and cleanup, shopping, sleep, work and leisure activities    Assessment of Occupational Performance:  1-3 Performance Deficits    Clinical Decision Making (Complexity): Low complexity    Treatment Explanation:  The following has been discussed with the patient:  RX ordered/plan of care  Anticipated outcomes  Possible risks and side effects    Plan:  Frequency:  1 X week, once daily  Duration:  for 8 weeks    Treatment Plan:   Modalities:  US, Fluidotherapy and Paraffin  Therapeutic Exercise:  AROM, AAROM, PROM, Tendon Gliding, Blocking, Reverse Blocking, Extensor Tracking, Isotonics and Isometrics  Manual Techniques:  Scar mobilization, Myofascial release and Manual edema mobilization  Orthotic Fabrication:  Static orthosis    Discharge Plan:  Achieve all LTG.  Independent in home treatment program.  Reach maximal therapeutic benefit.    Home Exercise Program:  Orthosis, full-time, remove for showering and exercises  Hand AROM  Tendon gliding    Next Visit:  Edema management  Progress AROM  Check orthosis

## 2020-01-16 PROBLEM — M79.641 RIGHT HAND PAIN: Status: ACTIVE | Noted: 2020-01-16

## 2020-01-16 PROBLEM — Z47.89 AFTERCARE FOLLOWING SURGERY OF THE MUSCULOSKELETAL SYSTEM: Status: ACTIVE | Noted: 2020-01-16

## 2020-01-22 ENCOUNTER — THERAPY VISIT (OUTPATIENT)
Dept: OCCUPATIONAL THERAPY | Facility: CLINIC | Age: 35
End: 2020-01-22
Payer: COMMERCIAL

## 2020-01-22 DIAGNOSIS — M79.641 RIGHT HAND PAIN: Primary | ICD-10-CM

## 2020-01-22 DIAGNOSIS — Z47.89 AFTERCARE FOLLOWING SURGERY OF THE MUSCULOSKELETAL SYSTEM: ICD-10-CM

## 2020-01-22 PROCEDURE — 97110 THERAPEUTIC EXERCISES: CPT | Mod: GO | Performed by: OCCUPATIONAL THERAPIST

## 2020-01-22 PROCEDURE — 97140 MANUAL THERAPY 1/> REGIONS: CPT | Mod: GO | Performed by: OCCUPATIONAL THERAPIST

## 2020-01-22 NOTE — PROGRESS NOTES
SOAP note objective information for 1/22/2020.  Please refer to the daily flowsheet for treatment today, total treatment time and time spent performing 1:1 timed codes.     Diagnosis: Right ring finger metacarpal shaft fracture   DOI: 12/24/2020  DOS: 1/7/2020  Procedure: Open reduction internal fixation right ring metacarpal shaft fracture  Post: 2 w 1 d  Referring physician: Luis Kellogg MD      Pain Level (Scale 0-10)   1/15/2020   At Rest 0/10   With Use 3/10     Pain Description  Date 1/15/2020   Location Ring finger   Pain Quality Aching and Sharp   Frequency intermittent     Pain is worst  daytime   Exacerbated by  Hand movemnt   Relieved by cold and rest   Progression Gradually improving     Wound/Scar  Healing appropriately, sutures removed. Steri-strips intact. Mild ecchymosis throughout hand.      Edema (Circumference measured in cm)   1/15/2020 1/15/2020 1/22/2020     Ring Left Right Right   P1 5.7 5.8 5.5   PIP 5.2 5.9 5.3   P2 4.5 5.4 4.9   '    Sensation   WNL throughout all nerve distributions; per patient report    ROM  Hand 1/15/2020 1/22/2020     AROM(PROM) Right Right   Ring MP 0/28 Pres: 0/50  Post: /65   PIP -15/63 0/70   DIP 0/39 0/77   BRITO 115 197   Small MP 0/20 Pre: 0/35  Post: /55   PIP -20/75 -10/90   DIP 0/45 0/80   BRITO 120 195         Home Exercise Program:  Orthosis, full-time, remove for showering and exercises  Hand AROM  Tendon gliding  Reverse blocking  Finger extension from table top  Scar massage    Next Visit:  Scar massage  Progress AROM

## 2020-01-31 ENCOUNTER — THERAPY VISIT (OUTPATIENT)
Dept: OCCUPATIONAL THERAPY | Facility: CLINIC | Age: 35
End: 2020-01-31
Payer: COMMERCIAL

## 2020-01-31 DIAGNOSIS — Z47.89 AFTERCARE FOLLOWING SURGERY OF THE MUSCULOSKELETAL SYSTEM: ICD-10-CM

## 2020-01-31 DIAGNOSIS — M79.641 RIGHT HAND PAIN: Primary | ICD-10-CM

## 2020-01-31 PROCEDURE — 97110 THERAPEUTIC EXERCISES: CPT | Mod: GO | Performed by: OCCUPATIONAL THERAPIST

## 2020-01-31 NOTE — PROGRESS NOTES
SOAP note objective information for 1/31/2020.  Please refer to the daily flowsheet for treatment today, total treatment time and time spent performing 1:1 timed codes.     Diagnosis: Right ring finger metacarpal shaft fracture   DOI: 12/24/2020  DOS: 1/7/2020  Procedure: Open reduction internal fixation right ring metacarpal shaft fracture  Post: 3 w 3 d  Referring physician: Luis Kellogg MD      Pain Level (Scale 0-10)   1/15/2020 1/31/2020     At Rest 0/10 0/10   With Use 3/10 1-2/10     Pain Description  Date 1/15/2020   Location Ring finger   Pain Quality Aching and Sharp   Frequency intermittent     Pain is worst  daytime   Exacerbated by  Hand movemnt   Relieved by cold and rest   Progression Gradually improving     Wound/Scar  Healing appropriately, no adhesions. Slight sensitivity with massage.      Edema (Circumference measured in cm)   1/15/2020 1/15/2020 1/22/2020     Ring Left Right Right   P1 5.7 5.8 5.5   PIP 5.2 5.9 5.3   P2 4.5 5.4 4.9   '    Sensation   WNL throughout all nerve distributions; per patient report    ROM  Hand 1/15/2020 1/22/2020   1/31/2020     AROM(PROM) Right Right Right   Ring MP 0/28 Pres: 0/50  Post: /65 0/65   PIP -15/63 0/70 0/100   DIP 0/39 0/77 0/90   BRITO 115 197 255   Small MP 0/20 Pre: 0/35  Post: /55 0/65   PIP -20/75 -10/90 0/100   DIP 0/45 0/80 0/90   BRITO 120 195 255         Home Exercise Program:  Orthosis, full-time, remove for showering and exercises  Hand AROM  Tendon gliding  Reverse blocking  Finger extension from table top  Scar massage, scar gel pad at night    Next Visit:  Scar massage  Progress per MD

## 2020-02-06 DIAGNOSIS — S62.314A CLOSED DISPLACED FRACTURE OF BASE OF FOURTH METACARPAL BONE OF RIGHT HAND, INITIAL ENCOUNTER: Primary | ICD-10-CM

## 2020-02-12 ENCOUNTER — ANCILLARY PROCEDURE (OUTPATIENT)
Dept: GENERAL RADIOLOGY | Facility: CLINIC | Age: 35
End: 2020-02-12
Attending: ORTHOPAEDIC SURGERY
Payer: COMMERCIAL

## 2020-02-12 ENCOUNTER — OFFICE VISIT (OUTPATIENT)
Dept: ORTHOPEDICS | Facility: CLINIC | Age: 35
End: 2020-02-12
Payer: COMMERCIAL

## 2020-02-12 DIAGNOSIS — S62.314A CLOSED DISPLACED FRACTURE OF BASE OF FOURTH METACARPAL BONE OF RIGHT HAND, INITIAL ENCOUNTER: ICD-10-CM

## 2020-02-12 DIAGNOSIS — S62.314A CLOSED DISPLACED FRACTURE OF BASE OF FOURTH METACARPAL BONE OF RIGHT HAND, INITIAL ENCOUNTER: Primary | ICD-10-CM

## 2020-02-12 NOTE — NURSING NOTE
Reason For Visit:   Chief Complaint   Patient presents with     RECHECK     DOS 1/7/20 Open Reduction Internal fixation right ring finger metacarpal fracture        Primary MD: Saritha Rodney    Age: 34 year old    ?  No      There were no vitals taken for this visit.      Pain Assessment  Patient Currently in Pain: Denies               QuickDASH Assessment  No flowsheet data found.       Current Outpatient Medications   Medication Sig Dispense Refill     acetaminophen (TYLENOL) 500 MG tablet Take 500-1,000 mg by mouth every 6 hours as needed for mild pain (PT last dose 1.6.2020)       HYDROcodone-acetaminophen (NORCO) 5-325 MG tablet Take 1 tablet by mouth every 6 hours as needed for breakthrough pain (Patient not taking: Reported on 1/15/2020) 20 tablet 0     ibuprofen (ADVIL/MOTRIN) 200 MG tablet Take 200-400 mg by mouth as needed (PT last dose 1.6.2020)        norgestrel-ethinyl estradiol (ELINEST) 0.3-30 MG-MCG tablet Take 1 tablet by mouth daily (Patient not taking: Reported on 2/12/2020) 28 tablet 11       No Known Allergies    Ibeth Lindo ATC

## 2020-02-12 NOTE — PROGRESS NOTES
[facility]  Orthopedics  Luis Kellogg MD  2020     Name: Erika Ziegler  MRN: [id number]  Age: 34 year old  : 1985  Referring provider: Nick Kaplan     Chief Complaint: RECHECK (DOS 2020 ORIF right ring finger metacarpal fracture)     Date of Surgery: 2020     Procedure: Open reduction internal fixation right ring metacarpal shaft fracture     History of Present Illness:   Erika Ziegler is a 34 year old female 5 weeks status-post the above procedure who presents for postoperative evaluation. Today, the patient reports she has been healing well and doing her hand therapy exercises. She does note some pain with pressure over the incision, and has noticed some continued swelling in the area. She's been massaging the incision, doing hand exercises regularly, and removing her splint for light activity.     Physical Examination:  General: Healthy appearing female. Affect appropriate. Normal gait. Alert and oriented to surroundings.   Right Upper Extremity:      Sensation intact  Incision well approximated  No erythema or drainage   Fingers are well aligned   There is no scissoring  She can make a full composite fist and extend the digits fully  Fingers well-perfused    Radiographs:   Radiographs of the Right Hand (2020):  Progression of healing and well-maintained alignment of right fourth metacarpal fracture with plate and screws in place  I have independently reviewed the above imaging studies.     Assessment:   34 year old female 5 weeks s/p the above procedure, progressing appropriately.     Plan:   -She can discontinue the splint at this time.  -Activity as tolerated although avoid heavy lifting x 4 more weeks  -Continue home exercise program  -Her range of motion is excellent.  She may discontinue formal hand therapy.  -She can follow-up with me on an as needed basis    Luis Kellogg MD    Scribe Disclosure:  I, Buzz Escobar, am serving as a scribe to document  services personally performed by Luis Kellogg MD at this visit, based upon the provider's statements to me. All documentation has been reviewed by the aforementioned provider prior to being entered into the official medical record.

## 2020-02-12 NOTE — LETTER
2020       RE: Erika Ziegler  [address]  [facility] [address]     Dear Colleague,    Thank you for referring your patient, Erika Ziegler, to the [facility] at [facility]. Please see a copy of my visit note below.    [facility]  Orthopedics  Luis Kellogg MD  2020     Name: Erika Ziegler  MRN: [id number]  Age: 34 year old  : 1985  Referring provider: Nick Kaplan     Chief Complaint: RECHECK (DOS 2020 ORIF right ring finger metacarpal fracture)     Date of Surgery: 2020     Procedure: Open reduction internal fixation right ring metacarpal shaft fracture     History of Present Illness:   Erika Ziegler is a 34 year old female 5 weeks status-post the above procedure who presents for postoperative evaluation. Today, the patient reports she has been healing well and doing her hand therapy exercises. She does note some pain with pressure over the incision, and has noticed some continued swelling in the area. She's been massaging the incision, doing hand exercises regularly, and removing her splint for light activity.     Physical Examination:  General: Healthy appearing female. Affect appropriate. Normal gait. Alert and oriented to surroundings.   Right Upper Extremity:      Sensation intact  Incision well approximated  No erythema or drainage   Fingers are well aligned   There is no scissoring  She can make a full composite fist and extend the digits fully  Fingers well-perfused    Radiographs:   Radiographs of the Right Hand (2020):  Progression of healing and well-maintained alignment of right fourth metacarpal fracture with plate and screws in place  I have independently reviewed the above imaging studies.     Assessment:   34 year old female 5 weeks s/p the above procedure, progressing appropriately.     Plan:   -She can discontinue the splint at this time.  -Activity as tolerated although avoid  heavy lifting x 4 more weeks  -Continue home exercise program  -Her range of motion is excellent.  She may discontinue formal hand therapy.  -She can follow-up with me on an as needed basis    Luis Kellogg MD    Scribe Disclosure:  I, Buzz Escobar, am serving as a scribe to document services personally performed by Luis Kellogg MD at this visit, based upon the provider's statements to me. All documentation has been reviewed by the aforementioned provider prior to being entered into the official medical record.

## 2020-03-25 PROBLEM — M79.641 RIGHT HAND PAIN: Status: RESOLVED | Noted: 2020-01-16 | Resolved: 2020-03-25

## 2020-03-25 PROBLEM — Z47.89 AFTERCARE FOLLOWING SURGERY OF THE MUSCULOSKELETAL SYSTEM: Status: RESOLVED | Noted: 2020-01-16 | Resolved: 2020-03-25

## 2020-05-13 ENCOUNTER — MYC MEDICAL ADVICE (OUTPATIENT)
Dept: FAMILY MEDICINE | Facility: CLINIC | Age: 35
End: 2020-05-13

## 2020-05-20 NOTE — TELEPHONE ENCOUNTER
Advised calling [phone] to schedule or nursing if she has additional questions at [phone].  [employer] message sent with this info

## 2020-09-08 ENCOUNTER — MYC REFILL (OUTPATIENT)
Dept: FAMILY MEDICINE | Facility: CLINIC | Age: 35
End: 2020-09-08

## 2020-09-08 DIAGNOSIS — C53.9 MALIGNANT NEOPLASM OF CERVIX, UNSPECIFIED SITE (H): ICD-10-CM

## 2020-09-08 RX ORDER — NORGESTREL AND ETHINYL ESTRADIOL 0.3-0.03MG
1 KIT ORAL DAILY
Qty: 84 TABLET | Refills: 1 | Status: SHIPPED | OUTPATIENT
Start: 2020-09-08 | End: 2020-12-02

## 2020-10-05 ENCOUNTER — TRANSFERRED RECORDS (OUTPATIENT)
Dept: HEALTH INFORMATION MANAGEMENT | Facility: CLINIC | Age: 35
End: 2020-10-05

## 2020-10-13 NOTE — PROGRESS NOTES
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 She is here today for a surveillance visit and annual pap.      Oncology history:   Erika had some post coital bleeding and was seen by Dr. Silva for her annual visit. On pelvic examination she was noted to have a friable cervical mass for which a pap smear was obtained. She then underwent a colposcopy with biopsies taken with above diagnosis made. She would like to have children in the future and the current plan is to preserve her ovaries and undergo oocyte and/embryo preservation with surrogate carrier.   Pap/colpo history:   5/10/4: NIL   05: LSIL   3/2006 colpo with mild dysplasia   06: LSIL pap   07: ASCUS +HPV   07: LSIL   2008: colpo with mild dysplasia   08 & 2009: LSIL   2009: ANDRZEJ I-II   3/2010: colpo ANDRZEJ I   11/5/10, 11, 12: NIL   13: ASC-H, JERRI   2013: colpo with poorly differentiated carcinoma with neuroendocrine differentiation and partial tumor necrosis   13: PET CT with 6.5 x 5.2 cm cervical mass; tiny focus of increased metabolism in the right midpelvis laterally. The ureter at this level is difficult to follow. This may represent some activity in the distal ureter through it is difficult to completely exclude activity in a small non enlarged pelvic lymph node. Chest lear, no other evidence of mets.   13: Met with reproductive endocrinology to discuss fertility options. Per their recommendation, given the cervical cancer diagnosis and the size of the lesion, the patient is not a suitable candidate for transvaginal oocyte retrieval. Transabdominal ultrasound was  "performed in addition to transvaginal ultrasound, to evaluate for possible transabdominal oocyte retrieval, with ovaries positioned low in the pelvis precluding safe transabdominal oocyte retrieval. Discussed option of ovarian tissue cryopreservation and ovarian translocation prior to radiation therapy.   5/1/13: MR PELVIS IMPRESSION:  1. 5.1 x 3.1 x 5.9 cm enhancing cervical mass extending of the posterior aspect of the cervix not involving the vagina or uterus no evidence of parametrial spread.  2. Two small nonspecific pelvic lymph nodes, 1.0 x 0.6 cm left pelvic lymph node series 6 image 6, 0.6 x 1.0 cm right pelvic lymph node on image 6.  5/13/13: laparoscopy, left ovarian transposition, right salpingo oophorectomy (reproductive medicine taking right ovary after surgery)   5/20/13-6/17/13: Cycle #1-3 Etoposide/Cisplatin; began EBRT   6/24/13: Insertion of High Dose Rate Tandem and Ring for Iridium-192 implant. Pt tolerated well.   7/9/13: Completed brachytherapy  8/2/13-8/21/13: Cycle #1-2 Taxol/Carbo  9/11/13 PET/CT IMPRESSION:  1. No evidence of FDG avid malignancy in the neck, chest, abdomen, or pelvis.  2. Inflammatory changes in the presacral and perirectal spaces, likely post radiation change.  9/13/13-10/4/13: Cycle #3-4 Taxol/Carbo  12/16/13: PET/CT IMPRESSION:  1. No evidence of hypermetabolic activity within the neck, chest, abdomen, or pelvis to suggest active or metastatic disease.  2. Persistent inflammatory changes within the low pelvis, most compatible with posttreatment change, without associated hypermetabolic activity to suggest local recurrence.  12/18/13: recovering relatively well from treatment. She still has neuropathy in her feet. It is constant, annoying tingling and numbness in her toes. She has some relief with gabapentin and B6/L-glutamine. She also still has some \"digestive upset\" since finishing radiation therapy, has diarrhea especially in the morning. She also had some chest " discomfort last week, but this has since resolved and she attributes it to anxiety after meeting a patient with similar cancer to hers that has metastasized to lungs. She also continues to have some bleeding and discomfort with use of vaginal dilator. She still takes OCPs and has NOT been skipping sugar pill week. She does not feel she experiences any menopausal symptoms on these off weeks but has also not been monitoring it closely. Amenorrhea still.   3/17/14: CT C/A/P IMPRESSION: No CT scan findings in the chest, abdomen, or pelvis to indicate recurrent or metastatic tumor. Unchanged mild free fluid in the pelvis.  3/19/14 pap NIL  6/10/14: CT C/A/P Impression:   1. No evidence of recurrent or metastatic cervical cancer in the chest, abdomen, or pelvis.   2. New subtle wall thickening of the small bowel loops in the low abdomen, likely sequelae of prior radiation.   3. Stable nonspecific pulmonary nodules measuring up to 3 mm since at least 9/11/2013. Continued followup recommended until 12 month stability is documented. No new pulmonary nodule.   6/11/14: Pap NIL.  9/2/14: Pap NIL, HPV negative. CT cap showed: No evidence of recurrence or metastatic lesion in chest, abdomen, or pelvis. Stable sub-4 mm pulmonary nodules.  12/2/14: Pap LSIL, HPV negative. CT cap showed: Impression:   1. No evidence of recurrent local or metastatic disease in the chest, abdomen, or pelvis.  2. Stable sub-4 mm pulmonary nodule on the left. No new pulmonary nodules.  2/17/15: CT C/A/P: IMPRESSION:   1. Left lower lobe 2 mm nodule unchanged since initial imaging on 9/11/2013.  2. No evidence of recurrent local or metastatic disease in the chest, abdomen, or pelvis.  3/5/15: Pap ASC-H, LSIL also present, HPV 18 postive.    4/4/15: ED visit for 4 days of abdominal pain, increasing in severity with increased frequency of urination and bowel movements over the past 2 days as well. She also complains of abdominal distention. Gyn onc  consult in hospital did not recommend CT at that time unless symptoms worsen. Discharged same day.   XRay abdomen: Nonobstructive bowel gas pattern. No free intraperitoneal air.      4/16/15: Colpo done. No aceto-white changes identified. No biopsies done. Plan to repeat pap in June as well as CT.       6/9/15: CT cap verbal preliminary report by Dr. Eric is no change in pulmonary nodules and no evidence of recurrence/metastatic disease. Pap pending.       Addendum: CT cap IMPRESSION:   1. Indeterminate 12 mm hypodensity within the uterine fundus may represent fluid within the endometrial canal secondary to cervical stenosis. Underlying uterine lesion not excluded. Initially, a dedicated pelvic ultrasound is recommended for further assessment.  2. No evidence of metastatic disease.  3. 2 mm nodules in the left lung are unchanged since at least 9/11/2013, and are statistically benign.       Plan for U/S for further evaluation of uterus.      7/2/15: U/S results pending. Verbal report per Dr. Guajardo shows a solid fibroid like area which is not fluid; recommend f/u with additional imaging.       U/S final IMPRESSION:   1. Relatively well-defined small mixed echogenicity myometrial mass in the uterine fundus. Fibroid could have this appearance, however given patient's history of cervical malignancy and radiation, consider a 3-6 month followup to ensure stability.  2. Ovaries are not identified.  3. Trace free fluid in the pelvis.      9/1/15: CT cap IMPRESSION:    1. No evidence of metastatic disease in the chest, abdomen, or pelvis.  2. Stable uterine fundus hypodensity most consistent with submucosal fibroid as described on pelvic ultrasound 7/2/2015. However, given the patient's history of cervical cancer, short-term followup ultrasound in 3-6 months is recommended.  3. Postsurgical changes of right salpingo-oophorectomy and left  Salpingectomy.      9/1/15: pap NIL, neg HPV.       12/4/15: pelvic ultrasound  FINDINGS:  The uterus measures 5.3 x 3.4 x 2.0 cm. The endometrium is within normal limits and measures 2.0 mm. There is no free fluid in the pelvis. Redemonstration of a heterogeneous appearing circumscribed submucosal mass in the uterine fundus measuring 10 x 8 x 6 mm, previously 8 x 13 x 9 mm. Unchanged calcification in the lower uterine segment. No new masses are identified. The right ovary is surgically absent. The left ovary was not visualized. No adnexal masses.  No focal abnormality of the visualized portions of the bladder.  IMPRESSION:    Mild decrease in the submucosal mass in the uterine fundus from 7/2/2015, which most likely represents a benign fibroid. No other suspicious masses are identified.      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       6//2016: Pap NIL. 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      12/6/16: CT cap IMPRESSION:   1. In this patient with history of cervical cancer there is no evidence of recurrent or metastatic disease in the chest, abdomen and pelvis.   2. Tiny pulmonary nodules are unchanged since 9/11/2013.      5/18/17: Pap NIL.  8/14/17: CT cap IMPRESSION: No evidence of recurrent or metastatic disease in the chest, abdomen and pelvis  2/20/18: CT cap IMPRESSION: No evidence of recurrent or metastatic disease in the chest, abdomen or pelvis.  8/13/18: CT cap IMPRESSION: In this patient with history of neuroendocrine carcinoma of the cervix status post chemotherapy and radiation therapy, there is no evidence of recurrence or metastatic disease in the chest, abdomen, or pelvis.      Per Dr. Salinas, does not need annual imaging.      9/13/18: Pap  ASCUS.  9/3/19: Pap NIL.  10/16/2020: Pap pending.         Today she comes to clinic feeling well and denies any concerns. She denies any vaginal bleeding, no changes in her bowel or bladder habits, no nausea/emesis, no lower extremity edema, and no difficulties eating or sleeping. She denies any abdominal discomfort/bloating, no fevers or chills, and no chest pain or shortness of breath. She denies any issues with sexual intercourse and does not need to use a lubricant. She continues to take her OCPs. Her annual exam is due in Dec.        Review of Systems     Constitutional:  Negative for fever, chills, weight loss, weight gain, fatigue, decreased appetite, night sweats, recent stressors, height gain, height loss, post-operative complications, incisional pain, hallucinations, increased energy, hyperactivity and confused.   HENT:  Negative for ear pain, hearing loss, tinnitus, nosebleeds, trouble swallowing, hoarse voice, mouth sores, sore throat, ear discharge, tooth pain, gum tenderness, taste disturbance, smell disturbance, hearing aid, bleeding gums, dry mouth, sinus pain, sinus congestion and neck mass.    Eyes:  Negative for double vision, pain, redness, eye pain, decreased vision, eye watering, eye bulging, eye dryness, flashing lights, spots, floaters, strabismus, tunnel vision, jaundice and eye irritation.   Respiratory:   Negative for cough, hemoptysis, sputum production, shortness of breath, wheezing, sleep disturbances due to breathing, snores loudly, respiratory pain, dyspnea on exertion, cough disturbing sleep and postural dyspnea.    Cardiovascular:  Negative for chest pain, dyspnea on exertion, palpitations, orthopnea, claudication, leg swelling, fingers/toes turn blue, hypertension, hypotension, syncope, history of heart murmur, chest pain on exertion, chest pain at rest, pacemaker, few scattered varicosities, leg pain, sleep disturbances due to breathing, tachycardia, light-headedness, exercise  intolerance and edema.   Gastrointestinal:  Negative for heartburn, nausea, vomiting, abdominal pain, diarrhea, constipation, blood in stool, melena, rectal pain, bloating, hemorrhoids, bowel incontinence, jaundice, rectal bleeding, coffee ground emesis and change in stool.   Genitourinary:  Negative for bladder incontinence, dysuria, urgency, hematuria, flank pain, vaginal discharge, difficulty urinating, genital sores, dyspareunia, decreased libido, nocturia, voiding less frequently, arousal difficulty, abnormal vaginal bleeding, excessive menstruation, menstrual changes, hot flashes, vaginal dryness and postmenopausal bleeding.   Musculoskeletal:  Negative for myalgias, back pain, joint swelling, arthralgias, stiffness, muscle cramps, neck pain, bone pain, muscle weakness and fracture.   Skin:  Negative for nail changes, itching, poor wound healing, rash, hair changes, skin changes, acne, warts, poor wound healing, scarring, flaky skin, Raynaud's phenomenon, sensitivity to sunlight and skin thickening.   Neurological:  Negative for dizziness, tingling, tremors, speech change, seizures, loss of consciousness, weakness, light-headedness, numbness, headaches, disturbances in coordination, extremity numbness, memory loss, difficulty walking and paralysis.   Endo/Heme:  Negative for anemia, swollen glands and bruises/bleeds easily.   Psychiatric/Behavioral:  Negative for depression, hallucinations, memory loss, decreased concentration, mood swings and panic attacks.    Breast:  Negative for breast discharge, breast mass, breast pain and nipple retraction.   Endocrine:  Negative for altered temperature regulation, polyphagia, polydipsia, unwanted hair growth and change in facial hair.          Past Medical History:    Past Medical History:   Diagnosis Date     Abnormal Pap smear 4/13     Cervix cancer (H) 4/2013    neuroendocrine     Hx of previous reproductive problem 5/13    Due to cancer treatment         Past  Surgical History:    Past Surgical History:   Procedure Laterality Date     BIOPSY       C HAND/FINGER SURGERY UNLISTED  January 2020     EXAM UNDER ANESTHESIA, INSERT JOESPH SLEEVE, UTERINE PLACEMENT OF TANDEM AND RING FOR RAD, ULTRASOUND  6/24/2013    Procedure: EXAM UNDER ANESTHESIA, INSERT JOESPH SLEEVE, UTERINE PLACEMENT OF TANDEM AND RING FOR RADIATION, ULTRASOUND GUIDED;  Pelvic Exam, Insert JOESPH Sleeve with Ultrasound Guidance and Place Tandem Ring for High Dose Radiation;  Surgeon: Roxy Brar MD;  Location: [facility] OR     LAPAROSCOPIC SALPINGO-OOPHORECTOMY  5/13/2013    Procedure: LAPAROSCOPIC SALPINGO-OOPHORECTOMY;  Laparoscopy, Left  salpingectomy,Ovarian Transposition, Right Salpingo Oophorectomy , Anesthesia General with block;  Surgeon: Deanna Salinas MD;  Location: [facility] OR     OPEN REDUCTION INTERNAL FIXATION HAND Right 1/7/2020    Procedure: OPEN REDUCTION INTERNAL FIXATION, FRACTURE, HAND;  Surgeon: Luis Kellogg MD;  Location: [facility] OR     wisdom teeth           Health Maintenance Due   Topic Date Due     HIV SCREENING  10/25/2000     INFLUENZA VACCINE (1) 09/01/2020       Current Medications:     Current Outpatient Medications   Medication Sig Dispense Refill     norgestrel-ethinyl estradiol (ELINEST) 0.3-30 MG-MCG tablet Take 1 tablet by mouth daily 84 tablet 1     acetaminophen (TYLENOL) 500 MG tablet Take 500-1,000 mg by mouth every 6 hours as needed for mild pain (PT last dose 1.6.2020)       HYDROcodone-acetaminophen (NORCO) 5-325 MG tablet Take 1 tablet by mouth every 6 hours as needed for breakthrough pain (Patient not taking: Reported on 1/15/2020) 20 tablet 0     ibuprofen (ADVIL/MOTRIN) 200 MG tablet Take 200-400 mg by mouth as needed (PT last dose 1.6.2020)            Allergies:      No Known Allergies     Social History:     Social History     Tobacco Use     Smoking status: Never Smoker     Smokeless tobacco: Never Used     Tobacco comment: Never been a smoker   Substance Use  Topics     Alcohol use: Yes     Alcohol/week: 0.0 standard drinks     Comment: On occasion       History   Drug Use No         Family History:       Family History   Problem Relation Age of Onset     Cancer Maternal Grandfather         leukemia     Other Cancer Maternal Grandfather         Grandpa had leukemia in his 80's     Unknown/Adopted Other         We have an adopted son, Jeremiah     Substance Abuse Father         My Dad has now been sober for over 40 years     Unknown/Adopted Other         Adopted 8/1/2019     Breast Cancer No family hx of      Cancer - colorectal No family hx of          Physical Exam:     BP (!) 142/91   Pulse 78   Temp 98.5  F (36.9  C)   Resp 16   Wt 69.3 kg (152 lb 11.2 oz)   SpO2 97%   BMI 22.55 kg/m    Body mass index is 22.55 kg/m .    General Appearance: healthy and alert, no distress     HEENT: no thyromegaly, no palpable nodules or masses        Cardiovascular: regular rate and rhythm, no gallops, rubs or murmurs     Respiratory: lungs clear, no rales, rhonchi or wheezes, normal diaphragmatic excursion    Musculoskeletal: extremities non tender and without edema    Skin: no lesions or rashes     Neurological: normal gait, no gross defects     Psychiatric: appropriate mood and affect                               Hematological: normal cervical, supraclavicular and inguinal lymph nodes     Gastrointestinal:       abdomen soft, non-tender, non-distended, no organomegaly or masses    Genitourinary: External genitalia and urethral meatus appears normal.  Vagina is smooth without nodularity or masses.  Cervix flush with vagina.  Bimanual exam reveal no masses, nodularity or fullness.  Recto-vaginal exam confirms these findings. Pap collected.       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 She is here today for a surveillance visit and annual pap.    15 minutes were spent with this patient, over 50% of that time was spent in symptom management, treatment planning and in counseling and coordination of care.      Plan:     1.)        Her annual pap (no HPV) was collected today. Reviewed recommendations from [facility] against performing colposcopy in patients treated for cervical cancer with Pap tests of LSIL or less. Colposcopy for LSIL in this group does not detect recurrence unless there is a visible lesion. Reviewed signs and symptoms for when she should contact the clinic or seek additional care. Patient to contact the clinic with any questions or concerns in the interim.     2.) Genetic risk factors were assessed and the patient does not meet the qualifications for a referral.      3.) Labs and/or tests ordered include:  Pap.      4.) Health maintenance issues addressed today include annual health maintenance and non-gynecologic issues with PCP. Encouraged to check her BP at outside locations and follow up with her PCP if her readings remain >130/80.    AMOL Whittaker, WHNP-BC, ANP-BC  Women's Health Nurse Practitioner  Adult Nurse Pracitioner  Division of Gynecologic Oncology          CC  Patient Care Team:  No Ref-Primary, Physician as PCP - General  Shima Babcock MD as MD (Neurology)  Shima Babcock MD as Referring Physician (Neurology)  Pancho Villareal MD as MD (Neurology)  Ajit Dexter MD as MD (Family Practice)

## 2020-10-16 ENCOUNTER — ONCOLOGY VISIT (OUTPATIENT)
Dept: ONCOLOGY | Facility: CLINIC | Age: 35
End: 2020-10-16
Attending: NURSE PRACTITIONER
Payer: COMMERCIAL

## 2020-10-16 VITALS
TEMPERATURE: 98.5 F | BODY MASS INDEX: 22.55 KG/M2 | SYSTOLIC BLOOD PRESSURE: 142 MMHG | DIASTOLIC BLOOD PRESSURE: 91 MMHG | OXYGEN SATURATION: 97 % | HEART RATE: 78 BPM | RESPIRATION RATE: 16 BRPM | WEIGHT: 152.7 LBS

## 2020-10-16 DIAGNOSIS — C53.1 MALIGNANT NEOPLASM OF EXOCERVIX (H): Primary | ICD-10-CM

## 2020-10-16 PROCEDURE — 99213 OFFICE O/P EST LOW 20 MIN: CPT | Performed by: NURSE PRACTITIONER

## 2020-10-16 PROCEDURE — 88175 CYTOPATH C/V AUTO FLUID REDO: CPT | Mod: TC | Performed by: NURSE PRACTITIONER

## 2020-10-16 PROCEDURE — G0463 HOSPITAL OUTPT CLINIC VISIT: HCPCS

## 2020-10-16 ASSESSMENT — ENCOUNTER SYMPTOMS
JAUNDICE: 0
NECK MASS: 0
MYALGIAS: 0
FLANK PAIN: 0
DISTURBANCES IN COORDINATION: 0
BACK PAIN: 0
HOARSE VOICE: 0
DECREASED CONCENTRATION: 0
DYSPNEA ON EXERTION: 0
NAUSEA: 0
FATIGUE: 0
WHEEZING: 0
BLOOD IN STOOL: 0
NECK PAIN: 0
POSTURAL DYSPNEA: 0
CHILLS: 0
HEMOPTYSIS: 0
TROUBLE SWALLOWING: 0
SYNCOPE: 0
DIARRHEA: 0
STIFFNESS: 0
CONSTIPATION: 0
COUGH DISTURBING SLEEP: 0
ORTHOPNEA: 0
ARTHRALGIAS: 0
JOINT SWELLING: 0
HOT FLASHES: 0
BREAST MASS: 0
EYE REDNESS: 0
SINUS CONGESTION: 0
BRUISES/BLEEDS EASILY: 0
LEG SWELLING: 0
WEAKNESS: 0
SINUS PAIN: 0
SPEECH CHANGE: 0
SORE THROAT: 0
BLOATING: 0
EXTREMITY NUMBNESS: 0
SNORES LOUDLY: 0
WEIGHT LOSS: 0
SWOLLEN GLANDS: 0
LEG PAIN: 0
DIZZINESS: 0
NAIL CHANGES: 0
HYPOTENSION: 0
POOR WOUND HEALING: 0
HYPERTENSION: 0
SHORTNESS OF BREATH: 0
HALLUCINATIONS: 0
BREAST PAIN: 0
POLYDIPSIA: 0
FEVER: 0
LIGHT-HEADEDNESS: 0
WEIGHT GAIN: 0
DECREASED APPETITE: 0
ALTERED TEMPERATURE REGULATION: 0
INCREASED ENERGY: 0
EYE PAIN: 0
HEMATURIA: 0
VOMITING: 0
TACHYCARDIA: 0
TINGLING: 0
SEIZURES: 0
COUGH: 0
INSOMNIA: 0
EXERCISE INTOLERANCE: 0
POLYPHAGIA: 0
SMELL DISTURBANCE: 0
SPUTUM PRODUCTION: 0
PARALYSIS: 0
HEADACHES: 0
RESPIRATORY PAIN: 0
DIFFICULTY URINATING: 0
NERVOUS/ANXIOUS: 0
MUSCLE CRAMPS: 0
NIGHT SWEATS: 0
MUSCLE WEAKNESS: 0
LOSS OF CONSCIOUSNESS: 0
CLAUDICATION: 0
DEPRESSION: 0
SLEEP DISTURBANCES DUE TO BREATHING: 0
RECTAL BLEEDING: 0
MEMORY LOSS: 0
DOUBLE VISION: 0
HEARTBURN: 0
EYE WATERING: 0
TASTE DISTURBANCE: 0
ABDOMINAL PAIN: 0
TREMORS: 0
RECTAL PAIN: 0
DECREASED LIBIDO: 0
PALPITATIONS: 0
PANIC: 0
EYE IRRITATION: 0
SKIN CHANGES: 0
DYSURIA: 0
NUMBNESS: 0
BOWEL INCONTINENCE: 0

## 2020-10-16 ASSESSMENT — PAIN SCALES - GENERAL: PAINLEVEL: NO PAIN (0)

## 2020-10-16 NOTE — LETTER
10/16/2020         RE: Erika Ziegler   Formerly [facility] [id number]        Dear Colleague,    Thank you for referring your patient, Erika Ziegler, to the [facility]. Please see a copy of my visit note below.                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 She is here today for a surveillance visit and annual pap.      Oncology history:   Erika had some post coital bleeding and was seen by Dr. Silva for her annual visit. On pelvic examination she was noted to have a friable cervical mass for which a pap smear was obtained. She then underwent a colposcopy with biopsies taken with above diagnosis made. She would like to have children in the future and the current plan is to preserve her ovaries and undergo oocyte and/embryo preservation with surrogate carrier.   Pap/colpo history:   5/10/4: NIL   05: LSIL   3/2006 colpo with mild dysplasia   06: LSIL pap   07: ASCUS +HPV   07: LSIL   2008: colpo with mild dysplasia   08 & 2009: LSIL   2009: ANDRZEJ I-II   3/2010: colpo ANDRZEJ I   11/5/10, 11, 12: NIL   13: ASC-H, JERRI   2013: colpo with poorly differentiated carcinoma with neuroendocrine differentiation and partial tumor necrosis   13: PET CT with 6.5 x 5.2 cm cervical mass; tiny focus of increased metabolism in the right midpelvis laterally. The ureter at this level is difficult to follow. This may represent some activity in the distal ureter through it is difficult to completely exclude activity in a small non enlarged pelvic lymph node. Chest lear, no other evidence of mets.   13:  Met with reproductive endocrinology to discuss fertility options. Per their recommendation, given the cervical cancer diagnosis and the size of the lesion, the patient is not a suitable candidate for transvaginal oocyte retrieval. Transabdominal ultrasound was performed in addition to transvaginal ultrasound, to evaluate for possible transabdominal oocyte retrieval, with ovaries positioned low in the pelvis precluding safe transabdominal oocyte retrieval. Discussed option of ovarian tissue cryopreservation and ovarian translocation prior to radiation therapy.   5/1/13: MR PELVIS IMPRESSION:  1. 5.1 x 3.1 x 5.9 cm enhancing cervical mass extending of the posterior aspect of the cervix not involving the vagina or uterus no evidence of parametrial spread.  2. Two small nonspecific pelvic lymph nodes, 1.0 x 0.6 cm left pelvic lymph node series 6 image 6, 0.6 x 1.0 cm right pelvic lymph node on image 6.  5/13/13: laparoscopy, left ovarian transposition, right salpingo oophorectomy (reproductive medicine taking right ovary after surgery)   5/20/13-6/17/13: Cycle #1-3 Etoposide/Cisplatin; began EBRT   6/24/13: Insertion of High Dose Rate Tandem and Ring for Iridium-192 implant. Pt tolerated well.   7/9/13: Completed brachytherapy  8/2/13-8/21/13: Cycle #1-2 Taxol/Carbo  9/11/13 PET/CT IMPRESSION:  1. No evidence of FDG avid malignancy in the neck, chest, abdomen, or pelvis.  2. Inflammatory changes in the presacral and perirectal spaces, likely post radiation change.  9/13/13-10/4/13: Cycle #3-4 Taxol/Carbo  12/16/13: PET/CT IMPRESSION:  1. No evidence of hypermetabolic activity within the neck, chest, abdomen, or pelvis to suggest active or metastatic disease.  2. Persistent inflammatory changes within the low pelvis, most compatible with posttreatment change, without associated hypermetabolic activity to suggest local recurrence.  12/18/13: recovering relatively well from treatment. She still has neuropathy in her feet.  "It is constant, annoying tingling and numbness in her toes. She has some relief with gabapentin and B6/L-glutamine. She also still has some \"digestive upset\" since finishing radiation therapy, has diarrhea especially in the morning. She also had some chest discomfort last week, but this has since resolved and she attributes it to anxiety after meeting a patient with similar cancer to hers that has metastasized to lungs. She also continues to have some bleeding and discomfort with use of vaginal dilator. She still takes OCPs and has NOT been skipping sugar pill week. She does not feel she experiences any menopausal symptoms on these off weeks but has also not been monitoring it closely. Amenorrhea still.   3/17/14: CT C/A/P IMPRESSION: No CT scan findings in the chest, abdomen, or pelvis to indicate recurrent or metastatic tumor. Unchanged mild free fluid in the pelvis.  3/19/14 pap NIL  6/10/14: CT C/A/P Impression:   1. No evidence of recurrent or metastatic cervical cancer in the chest, abdomen, or pelvis.   2. New subtle wall thickening of the small bowel loops in the low abdomen, likely sequelae of prior radiation.   3. Stable nonspecific pulmonary nodules measuring up to 3 mm since at least 9/11/2013. Continued followup recommended until 12 month stability is documented. No new pulmonary nodule.   6/11/14: Pap NIL.  9/2/14: Pap NIL, HPV negative. CT cap showed: No evidence of recurrence or metastatic lesion in chest, abdomen, or pelvis. Stable sub-4 mm pulmonary nodules.  12/2/14: Pap LSIL, HPV negative. CT cap showed: Impression:   1. No evidence of recurrent local or metastatic disease in the chest, abdomen, or pelvis.  2. Stable sub-4 mm pulmonary nodule on the left. No new pulmonary nodules.  2/17/15: CT C/A/P: IMPRESSION:   1. Left lower lobe 2 mm nodule unchanged since initial imaging on 9/11/2013.  2. No evidence of recurrent local or metastatic disease in the chest, abdomen, or pelvis.  3/5/15: Pap " ASC-H, LSIL also present, HPV 18 postive.    4/4/15: ED visit for 4 days of abdominal pain, increasing in severity with increased frequency of urination and bowel movements over the past 2 days as well. She also complains of abdominal distention. Gyn onc consult in hospital did not recommend CT at that time unless symptoms worsen. Discharged same day.   XRay abdomen: Nonobstructive bowel gas pattern. No free intraperitoneal air.      4/16/15: Colpo done. No aceto-white changes identified. No biopsies done. Plan to repeat pap in June as well as CT.       6/9/15: CT cap verbal preliminary report by Dr. Eric is no change in pulmonary nodules and no evidence of recurrence/metastatic disease. Pap pending.       Addendum: CT cap IMPRESSION:   1. Indeterminate 12 mm hypodensity within the uterine fundus may represent fluid within the endometrial canal secondary to cervical stenosis. Underlying uterine lesion not excluded. Initially, a dedicated pelvic ultrasound is recommended for further assessment.  2. No evidence of metastatic disease.  3. 2 mm nodules in the left lung are unchanged since at least 9/11/2013, and are statistically benign.       Plan for U/S for further evaluation of uterus.      7/2/15: U/S results pending. Verbal report per Dr. Guajardo shows a solid fibroid like area which is not fluid; recommend f/u with additional imaging.       U/S final IMPRESSION:   1. Relatively well-defined small mixed echogenicity myometrial mass in the uterine fundus. Fibroid could have this appearance, however given patient's history of cervical malignancy and radiation, consider a 3-6 month followup to ensure stability.  2. Ovaries are not identified.  3. Trace free fluid in the pelvis.      9/1/15: CT cap IMPRESSION:    1. No evidence of metastatic disease in the chest, abdomen, or pelvis.  2. Stable uterine fundus hypodensity most consistent with submucosal fibroid as described on pelvic ultrasound 7/2/2015. However,  given the patient's history of cervical cancer, short-term followup ultrasound in 3-6 months is recommended.  3. Postsurgical changes of right salpingo-oophorectomy and left  Salpingectomy.      9/1/15: pap NIL, neg HPV.       12/4/15: pelvic ultrasound FINDINGS:  The uterus measures 5.3 x 3.4 x 2.0 cm. The endometrium is within normal limits and measures 2.0 mm. There is no free fluid in the pelvis. Redemonstration of a heterogeneous appearing circumscribed submucosal mass in the uterine fundus measuring 10 x 8 x 6 mm, previously 8 x 13 x 9 mm. Unchanged calcification in the lower uterine segment. No new masses are identified. The right ovary is surgically absent. The left ovary was not visualized. No adnexal masses.  No focal abnormality of the visualized portions of the bladder.  IMPRESSION:    Mild decrease in the submucosal mass in the uterine fundus from 7/2/2015, which most likely represents a benign fibroid. No other suspicious masses are identified.      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       6//2016: Pap NIL. 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      12/6/16: CT cap IMPRESSION:   1. In this patient with history of cervical cancer there is no evidence of recurrent or metastatic disease in the chest, abdomen and pelvis.   2. Tiny pulmonary nodules are unchanged since 9/11/2013.      5/18/17: Pap NIL.  8/14/17: CT cap IMPRESSION: No evidence of recurrent or metastatic disease in the chest, abdomen and pelvis  2/20/18: CT cap IMPRESSION: No evidence of recurrent or metastatic disease in the chest, abdomen or pelvis.  8/13/18: CT cap IMPRESSION: In this patient with  history of neuroendocrine carcinoma of the cervix status post chemotherapy and radiation therapy, there is no evidence of recurrence or metastatic disease in the chest, abdomen, or pelvis.      Per Dr. Salinas, does not need annual imaging.      9/13/18: Pap ASCUS.  9/3/19: Pap NIL.  10/16/2020: Pap pending.         Today she comes to clinic feeling well and denies any concerns. She denies any vaginal bleeding, no changes in her bowel or bladder habits, no nausea/emesis, no lower extremity edema, and no difficulties eating or sleeping. She denies any abdominal discomfort/bloating, no fevers or chills, and no chest pain or shortness of breath. She denies any issues with sexual intercourse and does not need to use a lubricant. She continues to take her OCPs. Her annual exam is due in Dec.        Review of Systems     Constitutional:  Negative for fever, chills, weight loss, weight gain, fatigue, decreased appetite, night sweats, recent stressors, height gain, height loss, post-operative complications, incisional pain, hallucinations, increased energy, hyperactivity and confused.   HENT:  Negative for ear pain, hearing loss, tinnitus, nosebleeds, trouble swallowing, hoarse voice, mouth sores, sore throat, ear discharge, tooth pain, gum tenderness, taste disturbance, smell disturbance, hearing aid, bleeding gums, dry mouth, sinus pain, sinus congestion and neck mass.    Eyes:  Negative for double vision, pain, redness, eye pain, decreased vision, eye watering, eye bulging, eye dryness, flashing lights, spots, floaters, strabismus, tunnel vision, jaundice and eye irritation.   Respiratory:   Negative for cough, hemoptysis, sputum production, shortness of breath, wheezing, sleep disturbances due to breathing, snores loudly, respiratory pain, dyspnea on exertion, cough disturbing sleep and postural dyspnea.    Cardiovascular:  Negative for chest pain, dyspnea on exertion, palpitations, orthopnea, claudication, leg swelling,  fingers/toes turn blue, hypertension, hypotension, syncope, history of heart murmur, chest pain on exertion, chest pain at rest, pacemaker, few scattered varicosities, leg pain, sleep disturbances due to breathing, tachycardia, light-headedness, exercise intolerance and edema.   Gastrointestinal:  Negative for heartburn, nausea, vomiting, abdominal pain, diarrhea, constipation, blood in stool, melena, rectal pain, bloating, hemorrhoids, bowel incontinence, jaundice, rectal bleeding, coffee ground emesis and change in stool.   Genitourinary:  Negative for bladder incontinence, dysuria, urgency, hematuria, flank pain, vaginal discharge, difficulty urinating, genital sores, dyspareunia, decreased libido, nocturia, voiding less frequently, arousal difficulty, abnormal vaginal bleeding, excessive menstruation, menstrual changes, hot flashes, vaginal dryness and postmenopausal bleeding.   Musculoskeletal:  Negative for myalgias, back pain, joint swelling, arthralgias, stiffness, muscle cramps, neck pain, bone pain, muscle weakness and fracture.   Skin:  Negative for nail changes, itching, poor wound healing, rash, hair changes, skin changes, acne, warts, poor wound healing, scarring, flaky skin, Raynaud's phenomenon, sensitivity to sunlight and skin thickening.   Neurological:  Negative for dizziness, tingling, tremors, speech change, seizures, loss of consciousness, weakness, light-headedness, numbness, headaches, disturbances in coordination, extremity numbness, memory loss, difficulty walking and paralysis.   Endo/Heme:  Negative for anemia, swollen glands and bruises/bleeds easily.   Psychiatric/Behavioral:  Negative for depression, hallucinations, memory loss, decreased concentration, mood swings and panic attacks.    Breast:  Negative for breast discharge, breast mass, breast pain and nipple retraction.   Endocrine:  Negative for altered temperature regulation, polyphagia, polydipsia, unwanted hair growth and change  in facial hair.          Past Medical History:    Past Medical History:   Diagnosis Date     Abnormal Pap smear 4/13     Cervix cancer (H) 4/2013    neuroendocrine     Hx of previous reproductive problem 5/13    Due to cancer treatment         Past Surgical History:    Past Surgical History:   Procedure Laterality Date     BIOPSY       C HAND/FINGER SURGERY UNLISTED  January 2020     EXAM UNDER ANESTHESIA, INSERT JOESPH SLEEVE, UTERINE PLACEMENT OF TANDEM AND RING FOR RAD, ULTRASOUND  6/24/2013    Procedure: EXAM UNDER ANESTHESIA, INSERT JOESPH SLEEVE, UTERINE PLACEMENT OF TANDEM AND RING FOR RADIATION, ULTRASOUND GUIDED;  Pelvic Exam, Insert JOESPH Sleeve with Ultrasound Guidance and Place Tandem Ring for High Dose Radiation;  Surgeon: Roxy Brar MD;  Location: [facility] OR     LAPAROSCOPIC SALPINGO-OOPHORECTOMY  5/13/2013    Procedure: LAPAROSCOPIC SALPINGO-OOPHORECTOMY;  Laparoscopy, Left  salpingectomy,Ovarian Transposition, Right Salpingo Oophorectomy , Anesthesia General with block;  Surgeon: Deanna Salinas MD;  Location: [facility] OR     OPEN REDUCTION INTERNAL FIXATION HAND Right 1/7/2020    Procedure: OPEN REDUCTION INTERNAL FIXATION, FRACTURE, HAND;  Surgeon: Luis Kellogg MD;  Location: [facility] OR     wisdom teeth           Health Maintenance Due   Topic Date Due     HIV SCREENING  10/25/2000     INFLUENZA VACCINE (1) 09/01/2020       Current Medications:     Current Outpatient Medications   Medication Sig Dispense Refill     norgestrel-ethinyl estradiol (ELINEST) 0.3-30 MG-MCG tablet Take 1 tablet by mouth daily 84 tablet 1     acetaminophen (TYLENOL) 500 MG tablet Take 500-1,000 mg by mouth every 6 hours as needed for mild pain (PT last dose 1.6.2020)       HYDROcodone-acetaminophen (NORCO) 5-325 MG tablet Take 1 tablet by mouth every 6 hours as needed for breakthrough pain (Patient not taking: Reported on 1/15/2020) 20 tablet 0     ibuprofen (ADVIL/MOTRIN) 200 MG tablet Take 200-400 mg by mouth as needed  (PT last dose 1.6.2020)            Allergies:      No Known Allergies     Social History:     Social History     Tobacco Use     Smoking status: Never Smoker     Smokeless tobacco: Never Used     Tobacco comment: Never been a smoker   Substance Use Topics     Alcohol use: Yes     Alcohol/week: 0.0 standard drinks     Comment: On occasion       History   Drug Use No         Family History:       Family History   Problem Relation Age of Onset     Cancer Maternal Grandfather         leukemia     Other Cancer Maternal Grandfather         Grandpa had leukemia in his 80's     Unknown/Adopted Other         We have an adopted son, Jeremaih     Substance Abuse Father         My Dad has now been sober for over 40 years     Unknown/Adopted Other         Adopted 8/1/2019     Breast Cancer No family hx of      Cancer - colorectal No family hx of          Physical Exam:     BP (!) 142/91   Pulse 78   Temp 98.5  F (36.9  C)   Resp 16   Wt 69.3 kg (152 lb 11.2 oz)   SpO2 97%   BMI 22.55 kg/m    Body mass index is 22.55 kg/m .    General Appearance: healthy and alert, no distress     HEENT: no thyromegaly, no palpable nodules or masses        Cardiovascular: regular rate and rhythm, no gallops, rubs or murmurs     Respiratory: lungs clear, no rales, rhonchi or wheezes, normal diaphragmatic excursion    Musculoskeletal: extremities non tender and without edema    Skin: no lesions or rashes     Neurological: normal gait, no gross defects     Psychiatric: appropriate mood and affect                               Hematological: normal cervical, supraclavicular and inguinal lymph nodes     Gastrointestinal:       abdomen soft, non-tender, non-distended, no organomegaly or masses    Genitourinary: External genitalia and urethral meatus appears normal.  Vagina is smooth without nodularity or masses.  Cervix flush with vagina.  Bimanual exam reveal no masses, nodularity or fullness.  Recto-vaginal exam confirms these findings. Pap  collected.       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 She is here today for a surveillance visit and annual pap.    15 minutes were spent with this patient, over 50% of that time was spent in symptom management, treatment planning and in counseling and coordination of care.      Plan:     1.)        Her annual pap (no HPV) was collected today. Reviewed recommendations from [facility] against performing colposcopy in patients treated for cervical cancer with Pap tests of LSIL or less. Colposcopy for LSIL in this group does not detect recurrence unless there is a visible lesion. Reviewed signs and symptoms for when she should contact the clinic or seek additional care. Patient to contact the clinic with any questions or concerns in the interim.     2.) Genetic risk factors were assessed and the patient does not meet the qualifications for a referral.      3.) Labs and/or tests ordered include:  Pap.      4.) Health maintenance issues addressed today include annual health maintenance and non-gynecologic issues with PCP. Encouraged to check her BP at outside locations and follow up with her PCP if her readings remain >130/80.    AMOL Whittaker, STORM-BC, ANP-BC  Women's Health Nurse Practitioner  Adult Nurse Pracitioner  Division of Gynecologic Oncology          CC  Patient Care Team:  No Ref-Primary, Physician as PCP - General  Shima Babcock MD as Referring Physician (Neurology)  Pancho Villareal MD as MD (Neurology)  Ajit Dexter MD as MD (Family Practice)

## 2020-10-16 NOTE — NURSING NOTE
"Oncology Rooming Note    October 16, 2020 10:30 AM   Erika Ziegler is a 34 year old female who presents for:    Chief Complaint   Patient presents with     Oncology Clinic Visit     Cervical ca (H)     Initial Vitals: BP (!) 142/91   Pulse 78   Temp 98.5  F (36.9  C)   Resp 16   Wt 69.3 kg (152 lb 11.2 oz)   SpO2 97%   BMI 22.55 kg/m   Estimated body mass index is 22.55 kg/m  as calculated from the following:    Height as of 1/7/20: 1.753 m (5' 9\").    Weight as of this encounter: 69.3 kg (152 lb 11.2 oz). Body surface area is 1.84 meters squared.  No Pain (0) Comment: Data Unavailable   No LMP recorded. (Menstrual status: Amenorrhea).  Allergies reviewed: Yes  Medications reviewed: Yes    Medications: Medication refills not needed today.  Pharmacy name entered into [employer]: [id number] - [place], [place] - [address] [place] [address]    Clinical concerns: No new concerns today. Cheyanne Burleson was notified.      Mina Hogan MA            "

## 2020-10-20 LAB
COPATH REPORT: NORMAL
PAP: NORMAL

## 2020-12-02 ENCOUNTER — VIRTUAL VISIT (OUTPATIENT)
Dept: FAMILY MEDICINE | Facility: CLINIC | Age: 35
End: 2020-12-02
Attending: FAMILY MEDICINE
Payer: COMMERCIAL

## 2020-12-02 DIAGNOSIS — C53.9 MALIGNANT NEOPLASM OF CERVIX, UNSPECIFIED SITE (H): ICD-10-CM

## 2020-12-02 PROCEDURE — 99213 OFFICE O/P EST LOW 20 MIN: CPT | Mod: TEL | Performed by: FAMILY MEDICINE

## 2020-12-02 RX ORDER — NORGESTREL AND ETHINYL ESTRADIOL 0.3-0.03MG
1 KIT ORAL DAILY
Qty: 84 TABLET | Refills: 3 | Status: SHIPPED | OUTPATIENT
Start: 2020-12-02 | End: 2022-02-13

## 2020-12-02 NOTE — LETTER
"12/2/2020       RE: Erika Ziegler  [facility] [address]     Dear Colleague,    Thank you for referring your patient, Erika Ziegler, to the [facility] [place] at [facility]. Please see a copy of my visit note below.    Erika Ziegler is a 35 year old female who is being evaluated via a billable telephone visit.      The patient has been notified of following:     \"This telephone visit will be conducted via a call between you and your physician/provider. We have found that certain health care needs can be provided without the need for a physical exam.  This service lets us provide the care you need with a short phone conversation.  If a prescription is necessary we can send it directly to your pharmacy.  If lab work is needed we can place an order for that and you can then stop by our lab to have the test done at a later time.    Telephone visits are billed at different rates depending on your insurance coverage. During this emergency period, for some insurers they may be billed the same as an in-person visit.  Please reach out to your insurance provider with any questions.    If during the course of the call the physician/provider feels a telephone visit is not appropriate, you will not be charged for this service.\"    Patient has given verbal consent for Telephone visit?  Yes    What phone number would you like to be contacted at? Preferred number    How would you like to obtain your AVS? Dorcas Rendon     Erika Ziegler is a 35 year old female who presents via phone visit today for the following health issues:    HPI   Last seen 12/2019  Here for renewal HRT after premature menopause with treatment of neuroendocrine tumor of cervix  She is in remission, followed yearly  Was placed on OCP for HRT rather than usual estradiol/progesterone combination    Reviewed risk factors for OCP's over age 35, and for " HRT:  Non smoker  No family history of CAD,CVA or blood clots  No personal history of blood clots, CAD or CVA or risk factors  No family history breast, uterine, ovairian cancer    Reviewed preventive care:  Getting flu vaccine next week   Due DM screening at age 40   Up to date other screening      Current Outpatient Medications   Medication     norgestrel-ethinyl estradiol (ELINEST) 0.3-30 MG-MCG tablet     No current facility-administered medications for this visit.        Review of Systems   12 point ROS negative except where noted above     Objective      Vitals:10/16/2020 142/90--was rushing  At oncology visit  Weight stable    No vitals were obtained today due to virtual visit.    healthy, alert and no distress  PSYCH: Alert and oriented times 3; coherent speech, normal   rate and volume, able to articulate logical thoughts, able   to abstract reason, no tangential thoughts, no hallucinations   or delusions  Her affect is normal  RESP: No cough, no audible wheezing, able to talk in full sentences  Remainder of exam unable to be completed due to telephone visits            Assessment/Plan:  1. HRT s/p premature menopause  Discussed treatment options, will continue current OCP's for simplicity  Would recommend change to estradiol/progesterone HRT rather than OCP's by age 40 as less risk blood clots,   Check bp outside office x 5 values has access to bp monitor    Send in via [employer]    Follow-up 1 year      Phone call duration:  *15* minutes    Again, thank you for allowing me to participate in the care of your patient.      Sincerely,    Ajit Dexter MD

## 2020-12-02 NOTE — PROGRESS NOTES
"Erika Ziegler is a 35 year old female who is being evaluated via a billable telephone visit.      The patient has been notified of following:     \"This telephone visit will be conducted via a call between you and your physician/provider. We have found that certain health care needs can be provided without the need for a physical exam.  This service lets us provide the care you need with a short phone conversation.  If a prescription is necessary we can send it directly to your pharmacy.  If lab work is needed we can place an order for that and you can then stop by our lab to have the test done at a later time.    Telephone visits are billed at different rates depending on your insurance coverage. During this emergency period, for some insurers they may be billed the same as an in-person visit.  Please reach out to your insurance provider with any questions.    If during the course of the call the physician/provider feels a telephone visit is not appropriate, you will not be charged for this service.\"    Patient has given verbal consent for Telephone visit?  Yes    What phone number would you like to be contacted at? Preferred number    How would you like to obtain your AVS? [facility]    Subjective     Erika Ziegler is a 35 year old female who presents via phone visit today for the following health issues:    HPI   Last seen 12/2019  Here for renewal HRT after premature menopause with treatment of neuroendocrine tumor of cervix  She is in remission, followed yearly  Was placed on OCP for HRT rather than usual estradiol/progesterone combination    Reviewed risk factors for OCP's over age 35, and for HRT:  Non smoker  No family history of CAD,CVA or blood clots  No personal history of blood clots, CAD or CVA or risk factors  No family history breast, uterine, ovairian cancer    Reviewed preventive care:  Getting flu vaccine next week   Due DM screening at age 40   Up to date other screening      Current " Outpatient Medications   Medication     norgestrel-ethinyl estradiol (ELINEST) 0.3-30 MG-MCG tablet     No current facility-administered medications for this visit.          Review of Systems   12 point ROS negative except where noted above         Objective          Vitals:10/16/2020 142/90--was rushing  At oncology visit  Weight stable    No vitals were obtained today due to virtual visit.    healthy, alert and no distress  PSYCH: Alert and oriented times 3; coherent speech, normal   rate and volume, able to articulate logical thoughts, able   to abstract reason, no tangential thoughts, no hallucinations   or delusions  Her affect is normal  RESP: No cough, no audible wheezing, able to talk in full sentences  Remainder of exam unable to be completed due to telephone visits            Assessment/Plan:  1. HRT s/p premature menopause  Discussed treatment options, will continue current OCP's for simplicity  Would recommend change to estradiol/progesterone HRT rather than OCP's by age 40 as less risk blood clots,   Check bp outside office x 5 values has access to bp monitor    Send in via [employer]    Follow-up 1 year      Phone call duration:  *15* minutes

## 2020-12-20 ENCOUNTER — HEALTH MAINTENANCE LETTER (OUTPATIENT)
Age: 35
End: 2020-12-20

## 2021-01-15 ENCOUNTER — HEALTH MAINTENANCE LETTER (OUTPATIENT)
Age: 36
End: 2021-01-15

## 2021-03-19 ENCOUNTER — IMMUNIZATION (OUTPATIENT)
Dept: NURSING | Facility: CLINIC | Age: 36
End: 2021-03-19
Payer: COMMERCIAL

## 2021-03-19 PROCEDURE — 0011A PR COVID VAC MODERNA 100 MCG/0.5 ML IM: CPT

## 2021-03-19 PROCEDURE — 91301 PR COVID VAC MODERNA 100 MCG/0.5 ML IM: CPT

## 2021-04-16 ENCOUNTER — IMMUNIZATION (OUTPATIENT)
Dept: NURSING | Facility: CLINIC | Age: 36
End: 2021-04-16
Attending: INTERNAL MEDICINE
Payer: COMMERCIAL

## 2021-04-16 PROCEDURE — 0012A PR COVID VAC MODERNA 100 MCG/0.5 ML IM: CPT

## 2021-04-16 PROCEDURE — 91301 PR COVID VAC MODERNA 100 MCG/0.5 ML IM: CPT

## 2021-09-20 ENCOUNTER — ONCOLOGY VISIT (OUTPATIENT)
Dept: ONCOLOGY | Facility: CLINIC | Age: 36
End: 2021-09-20
Attending: NURSE PRACTITIONER
Payer: COMMERCIAL

## 2021-09-20 VITALS
SYSTOLIC BLOOD PRESSURE: 158 MMHG | RESPIRATION RATE: 16 BRPM | HEART RATE: 88 BPM | WEIGHT: 153.7 LBS | BODY MASS INDEX: 22.7 KG/M2 | TEMPERATURE: 98.1 F | OXYGEN SATURATION: 99 % | DIASTOLIC BLOOD PRESSURE: 99 MMHG

## 2021-09-20 DIAGNOSIS — R03.0 ELEVATED BP WITHOUT DIAGNOSIS OF HYPERTENSION: ICD-10-CM

## 2021-09-20 DIAGNOSIS — C53.9 MALIGNANT NEOPLASM OF CERVIX, UNSPECIFIED SITE (H): Primary | ICD-10-CM

## 2021-09-20 PROCEDURE — 88175 CYTOPATH C/V AUTO FLUID REDO: CPT | Performed by: NURSE PRACTITIONER

## 2021-09-20 PROCEDURE — G0463 HOSPITAL OUTPT CLINIC VISIT: HCPCS

## 2021-09-20 PROCEDURE — 99213 OFFICE O/P EST LOW 20 MIN: CPT | Performed by: NURSE PRACTITIONER

## 2021-09-20 ASSESSMENT — PAIN SCALES - GENERAL: PAINLEVEL: NO PAIN (0)

## 2021-09-20 NOTE — PROGRESS NOTES
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 She is here today for a surveillance visit and annual pap.      Oncology history:   Erika had some post coital bleeding and was seen by Dr. Silva for her annual visit. On pelvic examination she was noted to have a friable cervical mass for which a pap smear was obtained. She then underwent a colposcopy with biopsies taken with above diagnosis made. She would like to have children in the future and the current plan is to preserve her ovaries and undergo oocyte and/embryo preservation with surrogate carrier.   Pap/colpo history:   5/10/4: NIL   05: LSIL   3/2006 colpo with mild dysplasia   06: LSIL pap   07: ASCUS +HPV   07: LSIL   2008: colpo with mild dysplasia   08 & 2009: LSIL   2009: ANDRZEJ I-II   3/2010: colpo ANDRZEJ I   11/5/10, 11, 12: NIL   13: ASC-H, JERRI   2013: colpo with poorly differentiated carcinoma with neuroendocrine differentiation and partial tumor necrosis   13: PET CT with 6.5 x 5.2 cm cervical mass; tiny focus of increased metabolism in the right midpelvis laterally. The ureter at this level is difficult to follow. This may represent some activity in the distal ureter through it is difficult to completely exclude activity in a small non enlarged pelvic lymph node. Chest lear, no other evidence of mets.   13: Met with reproductive endocrinology to discuss fertility options. Per their recommendation, given the cervical cancer diagnosis and the size of the lesion, the patient is not a suitable candidate for transvaginal oocyte retrieval. Transabdominal ultrasound was  "performed in addition to transvaginal ultrasound, to evaluate for possible transabdominal oocyte retrieval, with ovaries positioned low in the pelvis precluding safe transabdominal oocyte retrieval. Discussed option of ovarian tissue cryopreservation and ovarian translocation prior to radiation therapy.   5/1/13: MR PELVIS IMPRESSION:  1. 5.1 x 3.1 x 5.9 cm enhancing cervical mass extending of the posterior aspect of the cervix not involving the vagina or uterus no evidence of parametrial spread.  2. Two small nonspecific pelvic lymph nodes, 1.0 x 0.6 cm left pelvic lymph node series 6 image 6, 0.6 x 1.0 cm right pelvic lymph node on image 6.  5/13/13: laparoscopy, left ovarian transposition, right salpingo oophorectomy (reproductive medicine taking right ovary after surgery)   5/20/13-6/17/13: Cycle #1-3 Etoposide/Cisplatin; began EBRT   6/24/13: Insertion of High Dose Rate Tandem and Ring for Iridium-192 implant. Pt tolerated well.   7/9/13: Completed brachytherapy  8/2/13-8/21/13: Cycle #1-2 Taxol/Carbo  9/11/13 PET/CT IMPRESSION:  1. No evidence of FDG avid malignancy in the neck, chest, abdomen, or pelvis.  2. Inflammatory changes in the presacral and perirectal spaces, likely post radiation change.  9/13/13-10/4/13: Cycle #3-4 Taxol/Carbo  12/16/13: PET/CT IMPRESSION:  1. No evidence of hypermetabolic activity within the neck, chest, abdomen, or pelvis to suggest active or metastatic disease.  2. Persistent inflammatory changes within the low pelvis, most compatible with posttreatment change, without associated hypermetabolic activity to suggest local recurrence.  12/18/13: recovering relatively well from treatment. She still has neuropathy in her feet. It is constant, annoying tingling and numbness in her toes. She has some relief with gabapentin and B6/L-glutamine. She also still has some \"digestive upset\" since finishing radiation therapy, has diarrhea especially in the morning. She also had some chest " discomfort last week, but this has since resolved and she attributes it to anxiety after meeting a patient with similar cancer to hers that has metastasized to lungs. She also continues to have some bleeding and discomfort with use of vaginal dilator. She still takes OCPs and has NOT been skipping sugar pill week. She does not feel she experiences any menopausal symptoms on these off weeks but has also not been monitoring it closely. Amenorrhea still.   3/17/14: CT C/A/P IMPRESSION: No CT scan findings in the chest, abdomen, or pelvis to indicate recurrent or metastatic tumor. Unchanged mild free fluid in the pelvis.  3/19/14 pap NIL  6/10/14: CT C/A/P Impression:   1. No evidence of recurrent or metastatic cervical cancer in the chest, abdomen, or pelvis.   2. New subtle wall thickening of the small bowel loops in the low abdomen, likely sequelae of prior radiation.   3. Stable nonspecific pulmonary nodules measuring up to 3 mm since at least 9/11/2013. Continued followup recommended until 12 month stability is documented. No new pulmonary nodule.   6/11/14: Pap NIL.  9/2/14: Pap NIL, HPV negative. CT cap showed: No evidence of recurrence or metastatic lesion in chest, abdomen, or pelvis. Stable sub-4 mm pulmonary nodules.  12/2/14: Pap LSIL, HPV negative. CT cap showed: Impression:   1. No evidence of recurrent local or metastatic disease in the chest, abdomen, or pelvis.  2. Stable sub-4 mm pulmonary nodule on the left. No new pulmonary nodules.  2/17/15: CT C/A/P: IMPRESSION:   1. Left lower lobe 2 mm nodule unchanged since initial imaging on 9/11/2013.  2. No evidence of recurrent local or metastatic disease in the chest, abdomen, or pelvis.  3/5/15: Pap ASC-H, LSIL also present, HPV 18 postive.    4/4/15: ED visit for 4 days of abdominal pain, increasing in severity with increased frequency of urination and bowel movements over the past 2 days as well. She also complains of abdominal distention. Gyn onc  consult in hospital did not recommend CT at that time unless symptoms worsen. Discharged same day.   XRay abdomen: Nonobstructive bowel gas pattern. No free intraperitoneal air.      4/16/15: Colpo done. No aceto-white changes identified. No biopsies done. Plan to repeat pap in June as well as CT.       6/9/15: CT cap verbal preliminary report by Dr. Eric is no change in pulmonary nodules and no evidence of recurrence/metastatic disease. Pap pending.       Addendum: CT cap IMPRESSION:   1. Indeterminate 12 mm hypodensity within the uterine fundus may represent fluid within the endometrial canal secondary to cervical stenosis. Underlying uterine lesion not excluded. Initially, a dedicated pelvic ultrasound is recommended for further assessment.  2. No evidence of metastatic disease.  3. 2 mm nodules in the left lung are unchanged since at least 9/11/2013, and are statistically benign.       Plan for U/S for further evaluation of uterus.      7/2/15: U/S results pending. Verbal report per Dr. Guajardo shows a solid fibroid like area which is not fluid; recommend f/u with additional imaging.       U/S final IMPRESSION:   1. Relatively well-defined small mixed echogenicity myometrial mass in the uterine fundus. Fibroid could have this appearance, however given patient's history of cervical malignancy and radiation, consider a 3-6 month followup to ensure stability.  2. Ovaries are not identified.  3. Trace free fluid in the pelvis.      9/1/15: CT cap IMPRESSION:    1. No evidence of metastatic disease in the chest, abdomen, or pelvis.  2. Stable uterine fundus hypodensity most consistent with submucosal fibroid as described on pelvic ultrasound 7/2/2015. However, given the patient's history of cervical cancer, short-term followup ultrasound in 3-6 months is recommended.  3. Postsurgical changes of right salpingo-oophorectomy and left  Salpingectomy.      9/1/15: pap NIL, neg HPV.       12/4/15: pelvic ultrasound  FINDINGS:  The uterus measures 5.3 x 3.4 x 2.0 cm. The endometrium is within normal limits and measures 2.0 mm. There is no free fluid in the pelvis. Redemonstration of a heterogeneous appearing circumscribed submucosal mass in the uterine fundus measuring 10 x 8 x 6 mm, previously 8 x 13 x 9 mm. Unchanged calcification in the lower uterine segment. No new masses are identified. The right ovary is surgically absent. The left ovary was not visualized. No adnexal masses.  No focal abnormality of the visualized portions of the bladder.  IMPRESSION:    Mild decrease in the submucosal mass in the uterine fundus from 7/2/2015, which most likely represents a benign fibroid. No other suspicious masses are identified.      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       6//2016: Pap NIL. 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      12/6/16: CT cap IMPRESSION:   1. In this patient with history of cervical cancer there is no evidence of recurrent or metastatic disease in the chest, abdomen and pelvis.   2. Tiny pulmonary nodules are unchanged since 9/11/2013.      5/18/17: Pap NIL.  8/14/17: CT cap IMPRESSION: No evidence of recurrent or metastatic disease in the chest, abdomen and pelvis  2/20/18: CT cap IMPRESSION: No evidence of recurrent or metastatic disease in the chest, abdomen or pelvis.  8/13/18: CT cap IMPRESSION: In this patient with history of neuroendocrine carcinoma of the cervix status post chemotherapy and radiation therapy, there is no evidence of recurrence or metastatic disease in the chest, abdomen, or pelvis.      Per Dr. Salinas, does not need annual imaging.      9/13/18:  Pap ASCUS.  9/3/19: Pap NIL.  10/16/2020: Pap NIL  9/20/21: Pap pending.             Today she comes to clinic feeling well and denies any concerns. She denies any vaginal bleeding, no changes in her bowel or bladder habits, no nausea/emesis, no lower extremity edema, and no difficulties eating or sleeping. She denies any abdominal discomfort/bloating, no fevers or chills, and no chest pain or shortness of breath. She will be due for her annual exam by the end of the year. She is sexually active and states she uses a lubricant when needed. She states her PCP has mentioned to have her monitor her BP as it has been running elevated.          Review of Systems:    Systemic           no weight changes; no fever; no chills; no night sweats; no appetite changes  Skin           no rashes, or lesions  Eye           no irritation; no changes in vision  Olivia-Laryngeal           no dysphagia; no hoarseness   Pulmonary    no cough; no shortness of breath  Cardiovascular    no chest pain; no palpitations  Gastrointestinal    no diarrhea; no constipation; no abdominal pain; no changes in bowel habits; no blood in stool  Genitourinary   no urinary frequency; no urinary urgency; no dysuria; no pain; no abnormal vaginal discharge; no abnormal vaginal bleeding  Breast    no breast discharge; no breast changes; no breast pain  Musculoskeletal    no myalgias; no arthralgias; no back pain  Psychiatric           no depressed mood; no anxiety    Hematologic              no tender lymph nodes; no noticeable swellings or lumps   Endocrine    no hot flashes; no heat/cold intolerance         Neurological   no tremor; no numbness and tingling; no headaches; no difficulty sleeping      Past Medical History:    Past Medical History:   Diagnosis Date     Abnormal Pap smear 4/13     Cervix cancer (H) 4/2013    neuroendocrine     Hx of previous reproductive problem 5/13    Due to cancer treatment         Past Surgical History:    Past Surgical  History:   Procedure Laterality Date     BIOPSY       C HAND/FINGER SURGERY UNLISTED  January 2020     EXAM UNDER ANESTHESIA, INSERT JOESPH SLEEVE, UTERINE PLACEMENT OF TANDEM AND RING FOR RAD, ULTRASOUND  6/24/2013    Procedure: EXAM UNDER ANESTHESIA, INSERT JOESPH SLEEVE, UTERINE PLACEMENT OF TANDEM AND RING FOR RADIATION, ULTRASOUND GUIDED;  Pelvic Exam, Insert JOESPH Sleeve with Ultrasound Guidance and Place Tandem Ring for High Dose Radiation;  Surgeon: Roxy Brar MD;  Location: [facility] OR     LAPAROSCOPIC SALPINGO-OOPHORECTOMY  5/13/2013    Procedure: LAPAROSCOPIC SALPINGO-OOPHORECTOMY;  Laparoscopy, Left  salpingectomy,Ovarian Transposition, Right Salpingo Oophorectomy , Anesthesia General with block;  Surgeon: Deanna Salinas MD;  Location: [facility] OR     OPEN REDUCTION INTERNAL FIXATION HAND Right 1/7/2020    Procedure: OPEN REDUCTION INTERNAL FIXATION, FRACTURE, HAND;  Surgeon: Luis Kellogg MD;  Location: [facility] OR     wisdom teeth           Health Maintenance Due   Topic Date Due     ADVANCE CARE PLANNING  Never done     HIV SCREENING  Never done     HEPATITIS C SCREENING  Never done     PREVENTIVE CARE VISIT  12/04/2020     PHQ-2  01/01/2021     INFLUENZA VACCINE (1) 09/01/2021       Current Medications:     Current Outpatient Medications   Medication Sig Dispense Refill     norgestrel-ethinyl estradiol (ELINEST) 0.3-30 MG-MCG tablet Take 1 tablet by mouth daily 84 tablet 3         Allergies:      No Known Allergies     Social History:     Social History     Tobacco Use     Smoking status: Never Smoker     Smokeless tobacco: Never Used     Tobacco comment: Never been a smoker   Substance Use Topics     Alcohol use: Yes     Alcohol/week: 0.0 standard drinks     Comment: On occasion       History   Drug Use No         Family History:       Family History   Problem Relation Age of Onset     Cancer Maternal Grandfather         leukemia     Other Cancer Maternal Grandfather         Grandpa had leukemia in  his 80's     Unknown/Adopted Other         We have an adopted son, Jeremiah     Substance Abuse Father         My Dad has now been sober for over 40 years     Unknown/Adopted Other         Adopted 8/1/2019     Breast Cancer No family hx of      Cancer - colorectal No family hx of          Physical Exam:     BP (!) 158/99   Pulse 88   Temp 98.1  F (36.7  C) (Oral)   Resp 16   Wt 69.7 kg (153 lb 11.2 oz)   SpO2 99%   BMI 22.70 kg/m    Body mass index is 22.7 kg/m .    General Appearance: healthy and alert, no distress     HEENT: no thyromegaly, no palpable nodules or masses        Cardiovascular: regular rate and rhythm, no gallops, rubs or murmurs     Respiratory: lungs clear, no rales, rhonchi or wheezes, normal diaphragmatic excursion    Musculoskeletal: extremities non tender and without edema    Skin: no lesions or rashes     Neurological: normal gait, no gross defects     Psychiatric: appropriate mood and affect                               Hematological: normal cervical, supraclavicular and inguinal lymph nodes     Gastrointestinal:       abdomen soft, non-tender, non-distended, no organomegaly or masses    Genitourinary: External genitalia and urethral meatus appears normal.  Vagina is smooth without nodularity or masses.  Cervix flush with vagina.  Bimanual exam reveal no masses, nodularity or fullness.  Recto-vaginal exam confirms these findings. Pap collected.       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 She is here today for a surveillance visit and annual pap.    20 minutes spent on the date of the encounter doing chart review, history and exam, documentation and further activities as noted above      Plan:     1.)       Continue with annual pelvic exam and pap (no HPV); today's is  pending. She will call to schedule her next visit. Reviewed recommendations from [facility] not to perform surveillance pap smears in women diagnosed with endometrial cancer as this does not improve detection of local recurrence. Reviewed signs and symptoms for when she should contact the clinic or seek additional care. Patient to contact the clinic with any questions or concerns in the interim.  Answered all of her questions to the best of my ability.     2.) Elevated BP:encouraged to monitor her BP at home (her mother in law has a cuff) or at outside locations.      3.) Labs and/or tests ordered include:  Pap.      4.) Health maintenance issues addressed today include annual health maintenance and non-gynecologic issues with PCP.    AMOL Whittaker, WHNP-BC, ANP-BC  Women's Health Nurse Practitioner  Adult Nurse Practitioner  Division of Gynecologic Oncology          CC  Patient Care Team:  No Ref-Primary, Physician as PCP - General  Shima Babcock MD as MD (Neurology)  Shima Babcock MD as Referring Physician (Neurology)  Pancho Villareal MD as MD (Neurology)  Ajit Dexter MD as MD (Family Practice)  Ajit Dexter MD as Assigned PCP  Cheyanne Burleson APRN CNP as Assigned Cancer Care Provider

## 2021-09-20 NOTE — LETTER
2021         RE: Erika Ziegler   [facility] [address]        Dear Colleague,    Thank you for referring your patient, Erika Ziegler, to the [facility]. Please see a copy of my visit note below.                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 She is here today for a surveillance visit and annual pap.      Oncology history:   Erika had some post coital bleeding and was seen by Dr. Silva for her annual visit. On pelvic examination she was noted to have a friable cervical mass for which a pap smear was obtained. She then underwent a colposcopy with biopsies taken with above diagnosis made. She would like to have children in the future and the current plan is to preserve her ovaries and undergo oocyte and/embryo preservation with surrogate carrier.   Pap/colpo history:   5/10/4: NIL   05: LSIL   3/2006 colpo with mild dysplasia   06: LSIL pap   07: ASCUS +HPV   07: LSIL   2008: colpo with mild dysplasia   08 & 2009: LSIL   2009: ANDRZEJ I-II   3/2010: colpo ANDRZEJ I   11/5/10, 11, 12: NIL   13: ASC-H, JERRI   2013: colpo with poorly differentiated carcinoma with neuroendocrine differentiation and partial tumor necrosis   13: PET CT with 6.5 x 5.2 cm cervical mass; tiny focus of increased metabolism in the right midpelvis laterally. The ureter at this level is difficult to follow. This may represent some activity in the distal ureter through it is difficult to completely exclude activity in a small non enlarged pelvic lymph node. Chest lear, no other evidence of mets.    5/1/13: Met with reproductive endocrinology to discuss fertility options. Per their recommendation, given the cervical cancer diagnosis and the size of the lesion, the patient is not a suitable candidate for transvaginal oocyte retrieval. Transabdominal ultrasound was performed in addition to transvaginal ultrasound, to evaluate for possible transabdominal oocyte retrieval, with ovaries positioned low in the pelvis precluding safe transabdominal oocyte retrieval. Discussed option of ovarian tissue cryopreservation and ovarian translocation prior to radiation therapy.   5/1/13: MR PELVIS IMPRESSION:  1. 5.1 x 3.1 x 5.9 cm enhancing cervical mass extending of the posterior aspect of the cervix not involving the vagina or uterus no evidence of parametrial spread.  2. Two small nonspecific pelvic lymph nodes, 1.0 x 0.6 cm left pelvic lymph node series 6 image 6, 0.6 x 1.0 cm right pelvic lymph node on image 6.  5/13/13: laparoscopy, left ovarian transposition, right salpingo oophorectomy (reproductive medicine taking right ovary after surgery)   5/20/13-6/17/13: Cycle #1-3 Etoposide/Cisplatin; began EBRT   6/24/13: Insertion of High Dose Rate Tandem and Ring for Iridium-192 implant. Pt tolerated well.   7/9/13: Completed brachytherapy  8/2/13-8/21/13: Cycle #1-2 Taxol/Carbo  9/11/13 PET/CT IMPRESSION:  1. No evidence of FDG avid malignancy in the neck, chest, abdomen, or pelvis.  2. Inflammatory changes in the presacral and perirectal spaces, likely post radiation change.  9/13/13-10/4/13: Cycle #3-4 Taxol/Carbo  12/16/13: PET/CT IMPRESSION:  1. No evidence of hypermetabolic activity within the neck, chest, abdomen, or pelvis to suggest active or metastatic disease.  2. Persistent inflammatory changes within the low pelvis, most compatible with posttreatment change, without associated hypermetabolic activity to suggest local recurrence.  12/18/13: recovering relatively well from treatment. She still has neuropathy in  "her feet. It is constant, annoying tingling and numbness in her toes. She has some relief with gabapentin and B6/L-glutamine. She also still has some \"digestive upset\" since finishing radiation therapy, has diarrhea especially in the morning. She also had some chest discomfort last week, but this has since resolved and she attributes it to anxiety after meeting a patient with similar cancer to hers that has metastasized to lungs. She also continues to have some bleeding and discomfort with use of vaginal dilator. She still takes OCPs and has NOT been skipping sugar pill week. She does not feel she experiences any menopausal symptoms on these off weeks but has also not been monitoring it closely. Amenorrhea still.   3/17/14: CT C/A/P IMPRESSION: No CT scan findings in the chest, abdomen, or pelvis to indicate recurrent or metastatic tumor. Unchanged mild free fluid in the pelvis.  3/19/14 pap NIL  6/10/14: CT C/A/P Impression:   1. No evidence of recurrent or metastatic cervical cancer in the chest, abdomen, or pelvis.   2. New subtle wall thickening of the small bowel loops in the low abdomen, likely sequelae of prior radiation.   3. Stable nonspecific pulmonary nodules measuring up to 3 mm since at least 9/11/2013. Continued followup recommended until 12 month stability is documented. No new pulmonary nodule.   6/11/14: Pap NIL.  9/2/14: Pap NIL, HPV negative. CT cap showed: No evidence of recurrence or metastatic lesion in chest, abdomen, or pelvis. Stable sub-4 mm pulmonary nodules.  12/2/14: Pap LSIL, HPV negative. CT cap showed: Impression:   1. No evidence of recurrent local or metastatic disease in the chest, abdomen, or pelvis.  2. Stable sub-4 mm pulmonary nodule on the left. No new pulmonary nodules.  2/17/15: CT C/A/P: IMPRESSION:   1. Left lower lobe 2 mm nodule unchanged since initial imaging on 9/11/2013.  2. No evidence of recurrent local or metastatic disease in the chest, abdomen, or " pelvis.  3/5/15: Pap ASC-H, LSIL also present, HPV 18 postive.    4/4/15: ED visit for 4 days of abdominal pain, increasing in severity with increased frequency of urination and bowel movements over the past 2 days as well. She also complains of abdominal distention. Gyn onc consult in hospital did not recommend CT at that time unless symptoms worsen. Discharged same day.   XRay abdomen: Nonobstructive bowel gas pattern. No free intraperitoneal air.      4/16/15: Colpo done. No aceto-white changes identified. No biopsies done. Plan to repeat pap in June as well as CT.       6/9/15: CT cap verbal preliminary report by Dr. Eric is no change in pulmonary nodules and no evidence of recurrence/metastatic disease. Pap pending.       Addendum: CT cap IMPRESSION:   1. Indeterminate 12 mm hypodensity within the uterine fundus may represent fluid within the endometrial canal secondary to cervical stenosis. Underlying uterine lesion not excluded. Initially, a dedicated pelvic ultrasound is recommended for further assessment.  2. No evidence of metastatic disease.  3. 2 mm nodules in the left lung are unchanged since at least 9/11/2013, and are statistically benign.       Plan for U/S for further evaluation of uterus.      7/2/15: U/S results pending. Verbal report per Dr. Guajardo shows a solid fibroid like area which is not fluid; recommend f/u with additional imaging.       U/S final IMPRESSION:   1. Relatively well-defined small mixed echogenicity myometrial mass in the uterine fundus. Fibroid could have this appearance, however given patient's history of cervical malignancy and radiation, consider a 3-6 month followup to ensure stability.  2. Ovaries are not identified.  3. Trace free fluid in the pelvis.      9/1/15: CT cap IMPRESSION:    1. No evidence of metastatic disease in the chest, abdomen, or pelvis.  2. Stable uterine fundus hypodensity most consistent with submucosal fibroid as described on pelvic ultrasound  7/2/2015. However, given the patient's history of cervical cancer, short-term followup ultrasound in 3-6 months is recommended.  3. Postsurgical changes of right salpingo-oophorectomy and left  Salpingectomy.      9/1/15: pap NIL, neg HPV.       12/4/15: pelvic ultrasound FINDINGS:  The uterus measures 5.3 x 3.4 x 2.0 cm. The endometrium is within normal limits and measures 2.0 mm. There is no free fluid in the pelvis. Redemonstration of a heterogeneous appearing circumscribed submucosal mass in the uterine fundus measuring 10 x 8 x 6 mm, previously 8 x 13 x 9 mm. Unchanged calcification in the lower uterine segment. No new masses are identified. The right ovary is surgically absent. The left ovary was not visualized. No adnexal masses.  No focal abnormality of the visualized portions of the bladder.  IMPRESSION:    Mild decrease in the submucosal mass in the uterine fundus from 7/2/2015, which most likely represents a benign fibroid. No other suspicious masses are identified.      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       6//2016: Pap NIL. 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      12/6/16: CT cap IMPRESSION:   1. In this patient with history of cervical cancer there is no evidence of recurrent or metastatic disease in the chest, abdomen and pelvis.   2. Tiny pulmonary nodules are unchanged since 9/11/2013.      5/18/17: Pap NIL.  8/14/17: CT cap IMPRESSION: No evidence of recurrent or metastatic disease in the chest, abdomen and pelvis  2/20/18: CT cap IMPRESSION: No evidence of recurrent or metastatic disease in the chest, abdomen or pelvis.  8/13/18: CT cap IMPRESSION: In this  patient with history of neuroendocrine carcinoma of the cervix status post chemotherapy and radiation therapy, there is no evidence of recurrence or metastatic disease in the chest, abdomen, or pelvis.      Per Dr. Salinas, does not need annual imaging.      9/13/18: Pap ASCUS.  9/3/19: Pap NIL.  10/16/2020: Pap NIL  9/20/21: Pap pending.             Today she comes to clinic feeling well and denies any concerns. She denies any vaginal bleeding, no changes in her bowel or bladder habits, no nausea/emesis, no lower extremity edema, and no difficulties eating or sleeping. She denies any abdominal discomfort/bloating, no fevers or chills, and no chest pain or shortness of breath. She will be due for her annual exam by the end of the year. She is sexually active and states she uses a lubricant when needed. She states her PCP has mentioned to have her monitor her BP as it has been running elevated.          Review of Systems:    Systemic           no weight changes; no fever; no chills; no night sweats; no appetite changes  Skin           no rashes, or lesions  Eye           no irritation; no changes in vision  Olivia-Laryngeal           no dysphagia; no hoarseness   Pulmonary    no cough; no shortness of breath  Cardiovascular    no chest pain; no palpitations  Gastrointestinal    no diarrhea; no constipation; no abdominal pain; no changes in bowel habits; no blood in stool  Genitourinary   no urinary frequency; no urinary urgency; no dysuria; no pain; no abnormal vaginal discharge; no abnormal vaginal bleeding  Breast    no breast discharge; no breast changes; no breast pain  Musculoskeletal    no myalgias; no arthralgias; no back pain  Psychiatric           no depressed mood; no anxiety    Hematologic              no tender lymph nodes; no noticeable swellings or lumps   Endocrine    no hot flashes; no heat/cold intolerance         Neurological   no tremor; no numbness and tingling; no headaches; no difficulty  sleeping      Past Medical History:    Past Medical History:   Diagnosis Date     Abnormal Pap smear 4/13     Cervix cancer (H) 4/2013    neuroendocrine     Hx of previous reproductive problem 5/13    Due to cancer treatment         Past Surgical History:    Past Surgical History:   Procedure Laterality Date     BIOPSY       C HAND/FINGER SURGERY UNLISTED  January 2020     EXAM UNDER ANESTHESIA, INSERT JOESPH SLEEVE, UTERINE PLACEMENT OF TANDEM AND RING FOR RAD, ULTRASOUND  6/24/2013    Procedure: EXAM UNDER ANESTHESIA, INSERT JOESPH SLEEVE, UTERINE PLACEMENT OF TANDEM AND RING FOR RADIATION, ULTRASOUND GUIDED;  Pelvic Exam, Insert JOESPH Sleeve with Ultrasound Guidance and Place Tandem Ring for High Dose Radiation;  Surgeon: Roxy Brar MD;  Location: [facility] OR     LAPAROSCOPIC SALPINGO-OOPHORECTOMY  5/13/2013    Procedure: LAPAROSCOPIC SALPINGO-OOPHORECTOMY;  Laparoscopy, Left  salpingectomy,Ovarian Transposition, Right Salpingo Oophorectomy , Anesthesia General with block;  Surgeon: Deanna Salinas MD;  Location: [facility] OR     OPEN REDUCTION INTERNAL FIXATION HAND Right 1/7/2020    Procedure: OPEN REDUCTION INTERNAL FIXATION, FRACTURE, HAND;  Surgeon: Luis Kellogg MD;  Location: [facility] OR     wisdom teeth           Health Maintenance Due   Topic Date Due     ADVANCE CARE PLANNING  Never done     HIV SCREENING  Never done     HEPATITIS C SCREENING  Never done     PREVENTIVE CARE VISIT  12/04/2020     PHQ-2  01/01/2021     INFLUENZA VACCINE (1) 09/01/2021       Current Medications:     Current Outpatient Medications   Medication Sig Dispense Refill     norgestrel-ethinyl estradiol (ELINEST) 0.3-30 MG-MCG tablet Take 1 tablet by mouth daily 84 tablet 3         Allergies:      No Known Allergies     Social History:     Social History     Tobacco Use     Smoking status: Never Smoker     Smokeless tobacco: Never Used     Tobacco comment: Never been a smoker   Substance Use Topics     Alcohol use: Yes      Alcohol/week: 0.0 standard drinks     Comment: On occasion       History   Drug Use No         Family History:       Family History   Problem Relation Age of Onset     Cancer Maternal Grandfather         leukemia     Other Cancer Maternal Grandfather         Grandpa had leukemia in his 80's     Unknown/Adopted Other         We have an adopted son, Jeremiah     Substance Abuse Father         My Dad has now been sober for over 40 years     Unknown/Adopted Other         Adopted 8/1/2019     Breast Cancer No family hx of      Cancer - colorectal No family hx of          Physical Exam:     BP (!) 158/99   Pulse 88   Temp 98.1  F (36.7  C) (Oral)   Resp 16   Wt 69.7 kg (153 lb 11.2 oz)   SpO2 99%   BMI 22.70 kg/m    Body mass index is 22.7 kg/m .    General Appearance: healthy and alert, no distress     HEENT: no thyromegaly, no palpable nodules or masses        Cardiovascular: regular rate and rhythm, no gallops, rubs or murmurs     Respiratory: lungs clear, no rales, rhonchi or wheezes, normal diaphragmatic excursion    Musculoskeletal: extremities non tender and without edema    Skin: no lesions or rashes     Neurological: normal gait, no gross defects     Psychiatric: appropriate mood and affect                               Hematological: normal cervical, supraclavicular and inguinal lymph nodes     Gastrointestinal:       abdomen soft, non-tender, non-distended, no organomegaly or masses    Genitourinary: External genitalia and urethral meatus appears normal.  Vagina is smooth without nodularity or masses.  Cervix flush with vagina.  Bimanual exam reveal no masses, nodularity or fullness.  Recto-vaginal exam confirms these findings. Pap collected.       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 She is  here today for a surveillance visit and annual pap.    20 minutes spent on the date of the encounter doing chart review, history and exam, documentation and further activities as noted above      Plan:     1.)       Continue with annual pelvic exam and pap (no HPV); today's is pending. She will call to schedule her next visit. Reviewed recommendations from SGO not to perform surveillance pap smears in women diagnosed with endometrial cancer as this does not improve detection of local recurrence. Reviewed signs and symptoms for when she should contact the clinic or seek additional care. Patient to contact the clinic with any questions or concerns in the interim.  Answered all of her questions to the best of my ability.     2.) Elevated BP:encouraged to monitor her BP at home (her mother in law has a cuff) or at outside locations.      3.) Labs and/or tests ordered include:  Pap.      4.) Health maintenance issues addressed today include annual health maintenance and non-gynecologic issues with PCP.    AMOL Whittaker, WHNP-BC, ANP-BC  Women's Health Nurse Practitioner  Adult Nurse Practitioner  Division of Gynecologic Oncology          CC  Patient Care Team:  No Ref-Primary, Physician as PCP - Shima Viera MD as MD (Neurology)  Pancho Villareal MD as MD (Neurology)  Ajit Dexter MD as MD (Family Practice)

## 2021-09-22 LAB
BKR LAB AP GYN ADEQUACY: NORMAL
BKR LAB AP GYN INTERPRETATION: NORMAL
BKR LAB AP HPV REFLEX: NO
BKR LAB AP PREVIOUS ABNORMAL: NORMAL
PATH REPORT.COMMENTS IMP SPEC: NORMAL
PATH REPORT.RELEVANT HX SPEC: NORMAL

## 2021-10-03 ENCOUNTER — HEALTH MAINTENANCE LETTER (OUTPATIENT)
Age: 36
End: 2021-10-03

## 2022-01-23 ENCOUNTER — HEALTH MAINTENANCE LETTER (OUTPATIENT)
Age: 37
End: 2022-01-23

## 2022-01-31 DIAGNOSIS — C53.9 MALIGNANT NEOPLASM OF CERVIX, UNSPECIFIED SITE (H): ICD-10-CM

## 2022-02-02 RX ORDER — NORGESTREL AND ETHINYL ESTRADIOL 0.3-0.03MG
1 KIT ORAL DAILY
Qty: 84 TABLET | Refills: 3 | OUTPATIENT
Start: 2022-02-02

## 2022-02-11 ENCOUNTER — MYC MEDICAL ADVICE (OUTPATIENT)
Dept: FAMILY MEDICINE | Facility: CLINIC | Age: 37
End: 2022-02-11
Payer: COMMERCIAL

## 2022-02-11 DIAGNOSIS — C53.9 MALIGNANT NEOPLASM OF CERVIX, UNSPECIFIED SITE (H): ICD-10-CM

## 2022-02-13 RX ORDER — NORGESTREL AND ETHINYL ESTRADIOL 0.3-0.03MG
1 KIT ORAL DAILY
Qty: 84 TABLET | Refills: 0 | Status: SHIPPED | OUTPATIENT
Start: 2022-02-13 | End: 2022-06-15

## 2022-06-10 ASSESSMENT — ENCOUNTER SYMPTOMS
DYSURIA: 0
FEVER: 0
HEARTBURN: 0
PALPITATIONS: 0
HEMATURIA: 0
DIARRHEA: 0
PARESTHESIAS: 0
FREQUENCY: 0
EYE PAIN: 0
NAUSEA: 0
COUGH: 0
SHORTNESS OF BREATH: 0
JOINT SWELLING: 0
HEADACHES: 0
HEMATOCHEZIA: 0
NERVOUS/ANXIOUS: 0
WEAKNESS: 0
MYALGIAS: 0
SORE THROAT: 0
CONSTIPATION: 0
BREAST MASS: 0
CHILLS: 0
DIZZINESS: 0
ABDOMINAL PAIN: 0
ARTHRALGIAS: 0

## 2022-06-15 ENCOUNTER — OFFICE VISIT (OUTPATIENT)
Dept: FAMILY MEDICINE | Facility: CLINIC | Age: 37
End: 2022-06-15
Payer: COMMERCIAL

## 2022-06-15 VITALS
HEART RATE: 76 BPM | BODY MASS INDEX: 22.44 KG/M2 | WEIGHT: 151.5 LBS | DIASTOLIC BLOOD PRESSURE: 80 MMHG | HEIGHT: 69 IN | SYSTOLIC BLOOD PRESSURE: 135 MMHG

## 2022-06-15 DIAGNOSIS — Z85.41 HISTORY OF CERVICAL CANCER: ICD-10-CM

## 2022-06-15 DIAGNOSIS — Z00.00 ANNUAL PHYSICAL EXAM: Primary | ICD-10-CM

## 2022-06-15 DIAGNOSIS — E28.39 PREMATURE OVARIAN FAILURE: ICD-10-CM

## 2022-06-15 PROCEDURE — 99395 PREV VISIT EST AGE 18-39: CPT | Performed by: FAMILY MEDICINE

## 2022-06-15 RX ORDER — NORGESTREL AND ETHINYL ESTRADIOL 0.3-0.03MG
1 KIT ORAL DAILY
Qty: 84 TABLET | Refills: 4 | Status: SHIPPED | OUTPATIENT
Start: 2022-06-15 | End: 2023-08-22

## 2022-06-15 ASSESSMENT — ENCOUNTER SYMPTOMS
HEMATOCHEZIA: 0
SHORTNESS OF BREATH: 0
NAUSEA: 0
DYSURIA: 0
ABDOMINAL PAIN: 0
HEARTBURN: 0
CHILLS: 0
COUGH: 0
HEMATURIA: 0
FEVER: 0
DIZZINESS: 0
PARESTHESIAS: 0
JOINT SWELLING: 0
WEAKNESS: 0
ARTHRALGIAS: 0
SORE THROAT: 0
PALPITATIONS: 0
EYE PAIN: 0
NERVOUS/ANXIOUS: 0
DIARRHEA: 0
BREAST MASS: 0
FREQUENCY: 0
HEADACHES: 0
CONSTIPATION: 0
MYALGIAS: 0

## 2022-06-15 NOTE — PROGRESS NOTES
SUBJECTIVE:   CC: Erika Ziegler is an 36 year old woman who presents for preventive health visit.     Chief Complaints and History of Present Illnesses   Patient presents with     Physical          Patient has been advised of split billing requirements and indicates understanding: Yes  Healthy Habits:     Getting at least 3 servings of Calcium per day:  Yes    Bi-annual eye exam:  Yes    Dental care twice a year:  NO    Sleep apnea or symptoms of sleep apnea:  None    Diet:  Regular (no restrictions) and Other    Frequency of exercise:  4-5 days/week    Duration of exercise:  15-30 minutes    Taking medications regularly:  Yes    Medication side effects:  None    PHQ-2 Total Score: 0    Additional concerns today:  Yes    Hx rare cervical cancer, still has uterus and 1 ovary remaining. On birth control for HRT. Seeing GYN annually still and pap smears. Moved from [place] to [place]. Did try to discontinue previously, but immediate menopause symptoms. Diagnosed April 2013 at 28 yo. Has 2 children, adopted, 7 yo and 3 yo. Stopped scans at 5 years. Yearly pelvic and pap with GYN.     Pulmonary nodules were stable, no further follow up.     Today's PHQ-2 Score:   PHQ-2 ( 1999 [employer]) 6/10/2022   Q1: Little interest or pleasure in doing things 0   Q2: Feeling down, depressed or hopeless 0   PHQ-2 Score 0   PHQ-2 Total Score (12-17 Years)- Positive if 3 or more points; Administer PHQ-A if positive -   Q1: Little interest or pleasure in doing things Not at all   Q2: Feeling down, depressed or hopeless Not at all   PHQ-2 Score 0   Some encounter information is confidential and restricted. Go to Review Flowsheets activity to see all data.       Abuse: Current or Past (Physical, Sexual or Emotional) - No  Do you feel safe in your environment? Yes    Have you ever done Advance Care Planning? (For example, a Health Directive, POLST, or a discussion with a medical provider or your loved ones about your  wishes): No, advance care planning information given to patient to review.  Advanced care planning was discussed at today's visit.    Social History     Tobacco Use     Smoking status: Never Smoker     Smokeless tobacco: Never Used     Tobacco comment: Never been a smoker   Substance Use Topics     Alcohol use: Yes     Alcohol/week: 0.0 standard drinks     Comment: On occasion     If you drink alcohol do you typically have >3 drinks per day or >7 drinks per week? No    Alcohol Use 6/10/2022   Prescreen: >3 drinks/day or >7 drinks/week? No       Reviewed orders with patient.  Reviewed health maintenance and updated orders accordingly - Yes    Breast Cancer Screening:    FHS-7: No flowsheet data found.    Patient under 40 years of age: Routine Mammogram Screening not recommended.   Pertinent mammograms are reviewed under the imaging tab.    History of abnormal Pap smear: Hx of cervical cancer, following with GYN  PAP / HPV Latest Ref Rng & Units 9/20/2021 10/16/2020 9/3/2019   PAP   Negative for Intraepithelial Lesion or Malignancy (NILM) - -   PAP (Historical) - - NIL NIL   HPV16 NEG - - -   HPV18 NEG - - -   HRHPV NEG - - -     Reviewed and updated as needed this visit by clinical staff   Tobacco  Allergies  Meds                Reviewed and updated as needed this visit by Provider   Tobacco  Allergies  Meds  Problems  Med Hx  Surg Hx  Fam Hx  Soc   Hx           Review of Systems   Constitutional: Negative for chills and fever.   HENT: Negative for congestion, ear pain, hearing loss and sore throat.    Eyes: Negative for pain and visual disturbance.   Respiratory: Negative for cough and shortness of breath.    Cardiovascular: Negative for chest pain, palpitations and peripheral edema.   Gastrointestinal: Negative for abdominal pain, constipation, diarrhea, heartburn, hematochezia and nausea.   Breasts:  Negative for tenderness, breast mass and discharge.   Genitourinary: Negative for dysuria, frequency,  "genital sores, hematuria, pelvic pain, urgency, vaginal bleeding and vaginal discharge.   Musculoskeletal: Negative for arthralgias, joint swelling and myalgias.   Skin: Negative for rash.   Neurological: Negative for dizziness, weakness, headaches and paresthesias.   Psychiatric/Behavioral: Negative for mood changes. The patient is not nervous/anxious.         OBJECTIVE:   /80 (BP Location: Left arm, Patient Position: Sitting, Cuff Size: Adult Regular)   Pulse 76   Ht 1.74 m (5' 8.5\")   Wt 68.7 kg (151 lb 8 oz)   LMP  (LMP Unknown)   BMI 22.70 kg/m    Physical Exam  GENERAL: healthy, alert and no distress  EYES: Eyes grossly normal to inspection, PERRL and conjunctivae and sclerae normal  HENT: ear canals and TM's normal, nose and mouth without ulcers or lesions  NECK: no adenopathy, no asymmetry, masses, or scars and thyroid normal to palpation  RESP: lungs clear to auscultation - no rales, rhonchi or wheezes  BREAST: normal without masses, tenderness or nipple discharge and no palpable axillary masses or adenopathy  CV: regular rate and rhythm, normal S1 S2, no S3 or S4, no murmur, click or rub, no peripheral edema and peripheral pulses strong  ABDOMEN: soft, nontender, no hepatosplenomegaly, no masses and bowel sounds normal   (female): deferred  MS: no gross musculoskeletal defects noted, no edema  SKIN: no suspicious lesions or rashes, 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 Annual physical exam  2. Premature ovarian failure  3. History of cervical cancer  - REVIEW OF HEALTH MAINTENANCE PROTOCOL ORDERS  - norgestrel-ethinyl estradiol (ELINEST) 0.3-30 MG-MCG tablet; Take 1 tablet by mouth daily  Dispense: 84 tablet; Refill: 4     Reviewed health history and health maintenance recommendations.  History of cervical cancer.  " "Continues to follow with gynecology for annual Pap smears.  Has laboratory confirmed premature ovarian failure.  Hot flash symptoms recurred when discontinued contraceptive pill for hormone replacement therapy.  She previously tolerated this agent and would like to resume.  Will monitor blood pressures.  No other red flags for HRT use.  Consider lower dose hormone replacement in the future.      COUNSELING:  Reviewed preventive health counseling, as reflected in patient instructions    Estimated body mass index is 22.7 kg/m  as calculated from the following:    Height as of this encounter: 1.74 m (5' 8.5\").    Weight as of this encounter: 68.7 kg (151 lb 8 oz).        She reports that she has never smoked. She has never used smokeless tobacco.      Counseling Resources:  ATP IV Guidelines  Pooled Cohorts Equation Calculator  Breast Cancer Risk Calculator  BRCA-Related Cancer Risk Assessment: FHS-7 Tool  FRAX Risk Assessment  ICSI Preventive Guidelines  Dietary Guidelines for Americans, 2010  USDA's MyPlate  ASA Prophylaxis  Lung CA Screening    Carolyn Dsouza, [facility]  "

## 2022-09-04 ENCOUNTER — HEALTH MAINTENANCE LETTER (OUTPATIENT)
Age: 37
End: 2022-09-04

## 2022-09-19 ENCOUNTER — ONCOLOGY VISIT (OUTPATIENT)
Dept: ONCOLOGY | Facility: CLINIC | Age: 37
End: 2022-09-19
Attending: NURSE PRACTITIONER
Payer: COMMERCIAL

## 2022-09-19 VITALS
TEMPERATURE: 97.9 F | RESPIRATION RATE: 16 BRPM | WEIGHT: 151 LBS | DIASTOLIC BLOOD PRESSURE: 86 MMHG | OXYGEN SATURATION: 98 % | SYSTOLIC BLOOD PRESSURE: 146 MMHG | BODY MASS INDEX: 22.63 KG/M2 | HEART RATE: 77 BPM

## 2022-09-19 DIAGNOSIS — Z85.41 HX OF CERVICAL CANCER: Primary | ICD-10-CM

## 2022-09-19 PROCEDURE — G0463 HOSPITAL OUTPT CLINIC VISIT: HCPCS

## 2022-09-19 PROCEDURE — 99214 OFFICE O/P EST MOD 30 MIN: CPT | Performed by: NURSE PRACTITIONER

## 2022-09-19 PROCEDURE — 88175 CYTOPATH C/V AUTO FLUID REDO: CPT | Performed by: NURSE PRACTITIONER

## 2022-09-19 ASSESSMENT — PAIN SCALES - GENERAL: PAINLEVEL: NO PAIN (0)
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
## 2022-09-19 NOTE — NURSING NOTE
"Oncology Rooming Note    September 19, 2022 9:42 AM   Erika Ziegler is a 36 year old female who presents for:    Chief Complaint   Patient presents with     Oncology Clinic Visit     Hx of cervical cancer     Initial Vitals: BP (!) 146/86   Pulse 77   Temp 97.9  F (36.6  C) (Oral)   Resp 16   Wt 68.5 kg (151 lb)   SpO2 98%   BMI 22.63 kg/m   Estimated body mass index is 22.63 kg/m  as calculated from the following:    Height as of 6/15/22: 1.74 m (5' 8.5\").    Weight as of this encounter: 68.5 kg (151 lb). Body surface area is 1.82 meters squared.  No Pain (0) Comment: Data Unavailable   No LMP recorded. (Menstrual status: Amenorrhea).  Allergies reviewed: Yes  Medications reviewed: Yes    Medications: Medication refills not needed today.  Pharmacy name entered into [employer]: [id number] - [place], [place] - [address]    Clinical concerns: none       Isabelle Eid CMA            "
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
## 2022-09-21 LAB
BKR LAB AP GYN ADEQUACY: NORMAL
BKR LAB AP GYN INTERPRETATION: NORMAL
BKR LAB AP HPV REFLEX: NO
BKR LAB AP PREVIOUS ABNORMAL: NORMAL
PATH REPORT.COMMENTS IMP SPEC: NORMAL
PATH REPORT.COMMENTS IMP SPEC: NORMAL
PATH REPORT.RELEVANT HX SPEC: NORMAL

## 2022-11-26 ENCOUNTER — OFFICE VISIT (OUTPATIENT)
Dept: FAMILY MEDICINE | Facility: CLINIC | Age: 37
End: 2022-11-26
Payer: COMMERCIAL

## 2022-11-26 VITALS
BODY MASS INDEX: 21.79 KG/M2 | WEIGHT: 145.4 LBS | HEART RATE: 113 BPM | OXYGEN SATURATION: 98 % | RESPIRATION RATE: 18 BRPM | SYSTOLIC BLOOD PRESSURE: 136 MMHG | TEMPERATURE: 100.5 F | DIASTOLIC BLOOD PRESSURE: 95 MMHG

## 2022-11-26 DIAGNOSIS — R05.1 ACUTE COUGH: ICD-10-CM

## 2022-11-26 DIAGNOSIS — J10.1 INFLUENZA A: Primary | ICD-10-CM

## 2022-11-26 LAB
FLUAV AG SPEC QL IA: POSITIVE
FLUBV AG SPEC QL IA: NEGATIVE

## 2022-11-26 PROCEDURE — 87804 INFLUENZA ASSAY W/OPTIC: CPT | Performed by: FAMILY MEDICINE

## 2022-11-26 PROCEDURE — 99213 OFFICE O/P EST LOW 20 MIN: CPT | Performed by: FAMILY MEDICINE

## 2022-11-26 NOTE — PROGRESS NOTES
Assessment:       Influenza A    Acute cough  - Influenza A & B Antigen - Clinic Collect         Plan:     Patient positive for influenza A.  Low risk for complications so would not recommend Tamiflu.  Recommend Tylenol, ibuprofen as needed for fever or discomfort.  Discussed the typical course of symptoms.  Follow-up if symptoms getting worse or not improving as expected.        Subjective:       37 year old female presents for evaluation of a 2-day history of sudden onset dry cough, fevers, body aches, chills, and headache.  She denies much sore throat, nasal congestion, or ear pain.  She is taken Tylenol and DayQuil.    Patient Active Problem List   Diagnosis     Adjustment disorder with mixed anxiety and depressed mood     Pulmonary nodules     Hx of cervical cancer     Premature ovarian failure       Past Medical History:   Diagnosis Date     Abnormal Pap smear 04/2013     Cervix cancer (H) 04/2013    neuroendocrine     Hx of previous reproductive problem 05/2013    Due to cancer treatment     Hypertension A couple years ago- on and off     MALIG NEOPLASM EXOCERVIX 05/14/2013       Past Surgical History:   Procedure Laterality Date     BIOPSY       EXAM UNDER ANESTHESIA, INSERT JOESPH SLEEVE, UTERINE PLACEMENT OF TANDEM AND RING FOR RAD, ULTRASOUND  6/24/2013    Procedure: EXAM UNDER ANESTHESIA, INSERT JOESPH SLEEVE, UTERINE PLACEMENT OF TANDEM AND RING FOR RADIATION, ULTRASOUND GUIDED;  Pelvic Exam, Insert JOESPH Sleeve with Ultrasound Guidance and Place Tandem Ring for High Dose Radiation;  Surgeon: Roxy Brar MD;  Location: [facility] OR     LAPAROSCOPIC SALPINGO-OOPHORECTOMY  5/13/2013    Procedure: LAPAROSCOPIC SALPINGO-OOPHORECTOMY;  Laparoscopy, Left  salpingectomy,Ovarian Transposition, Right Salpingo Oophorectomy , Anesthesia General with block;  Surgeon: Deanna Salinas MD;  Location: [facility] OR     OPEN REDUCTION INTERNAL FIXATION HAND Right 1/7/2020    Procedure: OPEN REDUCTION INTERNAL FIXATION, FRACTURE,  HAND;  Surgeon: Luis Kellogg MD;  Location: [facility] OR     wisdom teeth       [facility] HAND/FINGER SURGERY UNLISTED  January 2020       Current Outpatient Medications   Medication     norgestrel-ethinyl estradiol (ELINEST) 0.3-30 MG-MCG tablet     No current facility-administered medications for this visit.       No Known Allergies    Family History   Problem Relation Age of Onset     Cancer Maternal Grandfather         leukemia     Other Cancer Maternal Grandfather         Grandpa had leukemia in his 80's     Unknown/Adopted Other         Adopted 4/19/2016     Substance Abuse Father         My Dad has now been sober for over 40 years     Unknown/Adopted Other         Adopted 8/1/2019     Breast Cancer No family hx of      Cancer - colorectal No family hx of        Social History     Socioeconomic History     Marital status:      Spouse name: None     Number of children: None     Years of education: None     Highest education level: None   Occupational History     Occupation: Mental Health therapist   Tobacco Use     Smoking status: Never     Smokeless tobacco: Never     Tobacco comments:     Never been a smoker   Vaping Use     Vaping Use: Never used   Substance and Sexual Activity     Alcohol use: Yes     Comment: Occasional     Drug use: No     Sexual activity: Yes     Partners: Male     Birth control/protection: Pill   Other Topics Concern     Parent/sibling w/ CABG, MI or angioplasty before 65F 55M? No   Social History Narrative    How much exercise per week? 3 days    How much calcium per day? 1500 mg       How much caffeine per day? 1 cup soffee    How much vitamin D per day? 1000 iu    Do you/your family wear seatbelts?  Yes    Do you/your family use safety helmets? Yes    Do you/your family use sunscreen? Yes    Do you/your family keep firearms in the home? No    Do you/your family have a smoke detector(s)? Yes        Do you feel safe in your home? Yes    Has anyone ever touched you in an unwanted  manner? No     Explain         January 27, 2015 Alexandra Wheat LPN             Social Determinants of Health     Intimate Partner Violence: Not At Risk     Fear of Current or Ex-Partner: No     Emotionally Abused: No     Physically Abused: No     Sexually Abused: No         Review of Systems  Pertinent items are noted in HPI.      Objective:                     General Appearance:    BP (!) 136/95 (BP Location: Right arm, Patient Position: Sitting, Cuff Size: Adult Regular)   Pulse 113   Temp (!) 100.5  F (38.1  C) (Oral)   Resp 18   Wt 66 kg (145 lb 6.4 oz)   SpO2 98%   BMI 21.79 kg/m          Alert, mildly ill-appearing but in no distress.   Head:    Normocephalic, without obvious abnormality, atraumatic   Eyes:    Conjunctiva/corneas clear   Ears:    Normal TM's without erythema or bulging. Normal external ear canals, both ears   Nose:   Nares normal, septum midline, mucosa normal, no drainage    or sinus tenderness   Throat:   Lips, mucosa, and tongue normal; teeth and gums normal.  No tonsilar hypertrophy or exudate.   Neck:   Supple, symmetrical, trachea midline, no adenopathy    Lungs:     Clear to auscultation bilaterally without wheezes, rales, or rhonchi, respirations unlabored    Heart:    Regular rate and rhythm, S1 and S2 normal, no murmur, rub or gallop       Extremities:   Extremities normal, atraumatic, no cyanosis or edema   Skin:   Skin color, texture, turgor normal, no rashes or lesions           Results for orders placed or performed in visit on 11/26/22   Influenza A & B Antigen - Clinic Collect     Status: Abnormal    Specimen: Nose; Swab   Result Value Ref Range    Influenza A antigen Positive (A) Negative    Influenza B antigen Negative Negative    Narrative    Test results must be correlated with clinical data. If necessary, results should be confirmed by a molecular assay or viral culture.       This note has been dictated using voice recognition software. Any grammatical or context  distortions are unintentional and inherent to the software

## 2023-07-17 NOTE — PROGRESS NOTES
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 She is here today for a surveillance visit and annual pap.      Oncology history:   Erika had some post coital bleeding and was seen by Dr. Silva for her annual visit. On pelvic examination she was noted to have a friable cervical mass for which a pap smear was obtained. She then underwent a colposcopy with biopsies taken with above diagnosis made. She would like to have children in the future and the current plan is to preserve her ovaries and undergo oocyte and/embryo preservation with surrogate carrier.   Pap/colpo history:   5/10/4: NIL   05: LSIL   3/2006 colpo with mild dysplasia   06: LSIL pap   07: ASCUS +HPV   07: LSIL   2008: colpo with mild dysplasia   08 & 2009: LSIL   2009: ANDRZEJ I-II   3/2010: colpo ANDRZEJ I   11/5/10, 11, 12: NIL   13: ASC-H, JERRI   2013: colpo with poorly differentiated carcinoma with neuroendocrine differentiation and partial tumor necrosis   13: PET CT with 6.5 x 5.2 cm cervical mass; tiny focus of increased metabolism in the right midpelvis laterally. The ureter at this level is difficult to follow. This may represent some activity in the distal ureter through it is difficult to completely exclude activity in a small non enlarged pelvic lymph node. Chest lear, no other evidence of mets.   13: Met with reproductive endocrinology to discuss fertility options. Per their recommendation, given the cervical cancer diagnosis and the size of the lesion, the patient is not a suitable candidate for transvaginal oocyte retrieval. Transabdominal ultrasound was  "performed in addition to transvaginal ultrasound, to evaluate for possible transabdominal oocyte retrieval, with ovaries positioned low in the pelvis precluding safe transabdominal oocyte retrieval. Discussed option of ovarian tissue cryopreservation and ovarian translocation prior to radiation therapy.   5/1/13: MR PELVIS IMPRESSION:  1. 5.1 x 3.1 x 5.9 cm enhancing cervical mass extending of the posterior aspect of the cervix not involving the vagina or uterus no evidence of parametrial spread.  2. Two small nonspecific pelvic lymph nodes, 1.0 x 0.6 cm left pelvic lymph node series 6 image 6, 0.6 x 1.0 cm right pelvic lymph node on image 6.  5/13/13: laparoscopy, left ovarian transposition, right salpingo oophorectomy (reproductive medicine taking right ovary after surgery)   5/20/13-6/17/13: Cycle #1-3 Etoposide/Cisplatin; began EBRT   6/24/13: Insertion of High Dose Rate Tandem and Ring for Iridium-192 implant. Pt tolerated well.   7/9/13: Completed brachytherapy  8/2/13-8/21/13: Cycle #1-2 Taxol/Carbo  9/11/13 PET/CT IMPRESSION:  1. No evidence of FDG avid malignancy in the neck, chest, abdomen, or pelvis.  2. Inflammatory changes in the presacral and perirectal spaces, likely post radiation change.  9/13/13-10/4/13: Cycle #3-4 Taxol/Carbo  12/16/13: PET/CT IMPRESSION:  1. No evidence of hypermetabolic activity within the neck, chest, abdomen, or pelvis to suggest active or metastatic disease.  2. Persistent inflammatory changes within the low pelvis, most compatible with posttreatment change, without associated hypermetabolic activity to suggest local recurrence.  12/18/13: recovering relatively well from treatment. She still has neuropathy in her feet. It is constant, annoying tingling and numbness in her toes. She has some relief with gabapentin and B6/L-glutamine. She also still has some \"digestive upset\" since finishing radiation therapy, has diarrhea especially in the morning. She also had some chest " discomfort last week, but this has since resolved and she attributes it to anxiety after meeting a patient with similar cancer to hers that has metastasized to lungs. She also continues to have some bleeding and discomfort with use of vaginal dilator. She still takes OCPs and has NOT been skipping sugar pill week. She does not feel she experiences any menopausal symptoms on these off weeks but has also not been monitoring it closely. Amenorrhea still.   3/17/14: CT C/A/P IMPRESSION: No CT scan findings in the chest, abdomen, or pelvis to indicate recurrent or metastatic tumor. Unchanged mild free fluid in the pelvis.  3/19/14 pap NIL  6/10/14: CT C/A/P Impression:   1. No evidence of recurrent or metastatic cervical cancer in the chest, abdomen, or pelvis.   2. New subtle wall thickening of the small bowel loops in the low abdomen, likely sequelae of prior radiation.   3. Stable nonspecific pulmonary nodules measuring up to 3 mm since at least 9/11/2013. Continued followup recommended until 12 month stability is documented. No new pulmonary nodule.   6/11/14: Pap NIL.  9/2/14: Pap NIL, HPV negative. CT cap showed: No evidence of recurrence or metastatic lesion in chest, abdomen, or pelvis. Stable sub-4 mm pulmonary nodules.  12/2/14: Pap LSIL, HPV negative. CT cap showed: Impression:   1. No evidence of recurrent local or metastatic disease in the chest, abdomen, or pelvis.  2. Stable sub-4 mm pulmonary nodule on the left. No new pulmonary nodules.  2/17/15: CT C/A/P: IMPRESSION:   1. Left lower lobe 2 mm nodule unchanged since initial imaging on 9/11/2013.  2. No evidence of recurrent local or metastatic disease in the chest, abdomen, or pelvis.  3/5/15: Pap ASC-H, LSIL also present, HPV 18 postive.    4/4/15: ED visit for 4 days of abdominal pain, increasing in severity with increased frequency of urination and bowel movements over the past 2 days as well. She also complains of abdominal distention. Gyn onc  consult in hospital did not recommend CT at that time unless symptoms worsen. Discharged same day.   XRay abdomen: Nonobstructive bowel gas pattern. No free intraperitoneal air.      4/16/15: Colpo done. No aceto-white changes identified. No biopsies done. Plan to repeat pap in June as well as CT.       6/9/15: CT cap verbal preliminary report by Dr. Eric is no change in pulmonary nodules and no evidence of recurrence/metastatic disease. Pap pending.       Addendum: CT cap IMPRESSION:   1. Indeterminate 12 mm hypodensity within the uterine fundus may represent fluid within the endometrial canal secondary to cervical stenosis. Underlying uterine lesion not excluded. Initially, a dedicated pelvic ultrasound is recommended for further assessment.  2. No evidence of metastatic disease.  3. 2 mm nodules in the left lung are unchanged since at least 9/11/2013, and are statistically benign.       Plan for U/S for further evaluation of uterus.      7/2/15: U/S results pending. Verbal report per Dr. Guajardo shows a solid fibroid like area which is not fluid; recommend f/u with additional imaging.       U/S final IMPRESSION:   1. Relatively well-defined small mixed echogenicity myometrial mass in the uterine fundus. Fibroid could have this appearance, however given patient's history of cervical malignancy and radiation, consider a 3-6 month followup to ensure stability.  2. Ovaries are not identified.  3. Trace free fluid in the pelvis.      9/1/15: CT cap IMPRESSION:    1. No evidence of metastatic disease in the chest, abdomen, or pelvis.  2. Stable uterine fundus hypodensity most consistent with submucosal fibroid as described on pelvic ultrasound 7/2/2015. However, given the patient's history of cervical cancer, short-term followup ultrasound in 3-6 months is recommended.  3. Postsurgical changes of right salpingo-oophorectomy and left  Salpingectomy.      9/1/15: pap NIL, neg HPV.       12/4/15: pelvic ultrasound  FINDINGS:  The uterus measures 5.3 x 3.4 x 2.0 cm. The endometrium is within normal limits and measures 2.0 mm. There is no free fluid in the pelvis. Redemonstration of a heterogeneous appearing circumscribed submucosal mass in the uterine fundus measuring 10 x 8 x 6 mm, previously 8 x 13 x 9 mm. Unchanged calcification in the lower uterine segment. No new masses are identified. The right ovary is surgically absent. The left ovary was not visualized. No adnexal masses.  No focal abnormality of the visualized portions of the bladder.  IMPRESSION:    Mild decrease in the submucosal mass in the uterine fundus from 7/2/2015, which most likely represents a benign fibroid. No other suspicious masses are identified.      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       6//2016: Pap NIL. 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      12/6/16: CT cap IMPRESSION:   1. In this patient with history of cervical cancer there is no evidence of recurrent or metastatic disease in the chest, abdomen and pelvis.   2. Tiny pulmonary nodules are unchanged since 9/11/2013.      5/18/17: Pap NIL.  8/14/17: CT cap IMPRESSION: No evidence of recurrent or metastatic disease in the chest, abdomen and pelvis  2/20/18: CT cap IMPRESSION: No evidence of recurrent or metastatic disease in the chest, abdomen or pelvis.  8/13/18: CT cap IMPRESSION: In this patient with history of neuroendocrine carcinoma of the cervix status post chemotherapy and radiation therapy, there is no evidence of recurrence or metastatic disease in the chest, abdomen, or pelvis.      Per Dr. Salinas, does not need annual imaging.      9/13/18:  Pap ASCUS.  9/3/19: Pap NIL.  10/16/2020: Pap NIL  9/20/21: Pap NIL.  9/19/22: Pap NIL.  7/18/23: Pap pending.         Today she comes to clinic feeling well. She denies any vaginal bleeding, no changes in her bowel or bladder habits, no nausea/emesis, no lower extremity edema, and no difficulties eating or sleeping. She denies any abdominal discomfort/bloating, no fevers or chills, and no chest pain or shortness of breath. She has noticed two balding spots on her head; she has had this before and the hair grew back. She has been more sexually active recently and it is going well. She will be seeing her PCP in a few months for her annual exam and will be seen 8/1 regarding hair loss. She is very excited to be nearing her 10 year post treatment and plans a celebration in September.            Review of Systems:    Systemic           no weight changes; no fever; no chills; no night sweats; no appetite changes  Skin           no rashes, or lesions  Eye           no irritation; no changes in vision  Olivia-Laryngeal           no dysphagia; no hoarseness   Pulmonary    no cough; no shortness of breath  Cardiovascular    no chest pain; no palpitations  Gastrointestinal    no diarrhea; no constipation; no abdominal pain; no changes in bowel habits; no blood in stool  Genitourinary   no urinary frequency; no urinary urgency; no dysuria; no pain; no abnormal vaginal discharge; no abnormal vaginal bleeding  Breast    no breast discharge; no breast changes; no breast pain  Musculoskeletal    no myalgias; no arthralgias; no back pain  Psychiatric           no depressed mood; no anxiety    Hematologic              no tender lymph nodes; no noticeable swellings or lumps   Endocrine    no hot flashes; no heat/cold intolerance         Neurological   no tremor; no numbness and tingling; no headaches; no difficulty sleeping      Past Medical History:    Past Medical History:   Diagnosis Date     Abnormal Pap smear 04/2013     Cervix  cancer (H) 04/2013    neuroendocrine     Hx of previous reproductive problem 05/2013    Due to cancer treatment     Hypertension A couple years ago- on and off     MALIG NEOPLASM EXOCERVIX 05/14/2013         Past Surgical History:    Past Surgical History:   Procedure Laterality Date     BIOPSY       EXAM UNDER ANESTHESIA, INSERT JOESPH SLEEVE, UTERINE PLACEMENT OF TANDEM AND RING FOR RAD, ULTRASOUND  6/24/2013    Procedure: EXAM UNDER ANESTHESIA, INSERT JOESPH SLEEVE, UTERINE PLACEMENT OF TANDEM AND RING FOR RADIATION, ULTRASOUND GUIDED;  Pelvic Exam, Insert JOESPH Sleeve with Ultrasound Guidance and Place Tandem Ring for High Dose Radiation;  Surgeon: Roxy Brar MD;  Location: [facility] OR     LAPAROSCOPIC SALPINGO-OOPHORECTOMY  5/13/2013    Procedure: LAPAROSCOPIC SALPINGO-OOPHORECTOMY;  Laparoscopy, Left  salpingectomy,Ovarian Transposition, Right Salpingo Oophorectomy , Anesthesia General with block;  Surgeon: Deanna Salinas MD;  Location: [facility] OR     OPEN REDUCTION INTERNAL FIXATION HAND Right 1/7/2020    Procedure: OPEN REDUCTION INTERNAL FIXATION, FRACTURE, HAND;  Surgeon: Luis Kellogg MD;  Location: [facility] OR     wisdom teeth       [facility] HAND/FINGER SURGERY UNLISTED  January 2020         Health Maintenance Due   Topic Date Due     HEPATITIS B IMMUNIZATION (1 of 3 - 3-dose series) Never done     COVID-19 Vaccine (4 - Moderna series) 02/12/2022     PHQ-2 (once per calendar year)  01/01/2023     YEARLY PREVENTIVE VISIT  06/15/2023     HPV TEST  09/19/2023     PAP  09/19/2023       Current Medications:     Current Outpatient Medications   Medication Sig Dispense Refill     norgestrel-ethinyl estradiol (ELINEST) 0.3-30 MG-MCG tablet Take 1 tablet by mouth daily 84 tablet 4         Allergies:      No Known Allergies     Social History:     Social History     Tobacco Use     Smoking status: Never     Smokeless tobacco: Never     Tobacco comments:     Never been a smoker   Substance Use Topics     Alcohol use: Yes      Comment: Occasional       History   Drug Use No         Family History:     Family History   Problem Relation Age of Onset     Cancer Maternal Grandfather         leukemia     Other Cancer Maternal Grandfather         Grandpa had leukemia in his 80's     Unknown/Adopted Other         Adopted 4/19/2016     Substance Abuse Father         My Dad has now been sober for over 40 years     Unknown/Adopted Other         Adopted 8/1/2019     Breast Cancer No family hx of      Cancer - colorectal No family hx of          Physical Exam:     BP (!) 165/95 (BP Location: Right arm, Patient Position: Sitting, Cuff Size: Adult Regular)   Pulse 93   Temp 98.3  F (36.8  C) (Oral)   Wt 65.8 kg (145 lb)   SpO2 100%   BMI 21.73 kg/m    Body mass index is 21.73 kg/m .    General Appearance: healthy and alert, no distress     HEENT: no thyromegaly, no palpable nodules or masses        Cardiovascular: regular rate and rhythm, no gallops, rubs or murmurs     Respiratory: lungs clear, no rales, rhonchi or wheezes, normal diaphragmatic excursion    Musculoskeletal: extremities non tender and without edema    Skin: no lesions or rashes     Neurological: normal gait, no gross defects     Psychiatric: appropriate mood and affect                               Hematological: normal cervical, supraclavicular and inguinal lymph nodes     Gastrointestinal:       abdomen soft, non-tender, non-distended, no organomegaly or masses    Genitourinary: External genitalia and urethral meatus appears normal.  Vagina is smooth without nodularity or masses.  Cervix flush with vagina; scant spotting with cytobrush.  Bimanual exam reveal no masses, nodularity or fullness.  Recto-vaginal exam confirms these findings. Pap pending.       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 She is here today for a surveillance visit and annual pap.    30 minutes spent on the date of the encounter doing chart review, history and exam, documentation and further activities as noted above      Plan:     1.)       Continue with annual pelvic exam and pap; today's is pending. Reviewed recommendations from [facility] against performing colposcopy in patients treated for cervical cancer with Pap tests of LSIL or less. Colposcopy for LSIL in this group does not detect recurrence unless there is a visible lesion. Reviewed signs and symptoms for when she should contact the clinic or seek additional care. Patient to contact the clinic with any questions or concerns in the interim.  Answered all of her questions to the best of my ability. Discussed getting a DEXA within the next couple of years (or at 40) for a baseline given radiation.     2.) Genetic risk factors were assessed and the patient does not meet the qualifications for a referral.      3.) Labs and/or tests ordered include:  Pap.      4.) Health maintenance issues addressed today include annual health maintenance and non-gynecologic issues with PCP. Discussed getting a TSH checked regarding her hair loss; may also need to see Dermatology.     AMOL Whittaker, WHNP-BC, ANP-BC  Women's Health Nurse Practitioner  Adult Nurse Practitioner  Division of Gynecologic Oncology          CC  Patient Care Team:  Carolyn Dsouza DO as PCP - General (Family Medicine)  Shima Babcock MD as MD (Neurology)  Shima Babcock MD as Referring Physician (Neurology)  Pancho Villareal MD as MD (Neurology)  Ajit Dexter MD as MD (Family Practice)  Cheyanne Burleson APRN CNP as Assigned Cancer Care Provider  Carolyn Dsouza DO as Assigned PCP  SELF, REFERRED

## 2023-07-18 ENCOUNTER — ONCOLOGY VISIT (OUTPATIENT)
Dept: ONCOLOGY | Facility: CLINIC | Age: 38
End: 2023-07-18
Attending: NURSE PRACTITIONER
Payer: COMMERCIAL

## 2023-07-18 VITALS
SYSTOLIC BLOOD PRESSURE: 165 MMHG | WEIGHT: 145 LBS | BODY MASS INDEX: 21.73 KG/M2 | HEART RATE: 93 BPM | DIASTOLIC BLOOD PRESSURE: 95 MMHG | TEMPERATURE: 98.3 F | OXYGEN SATURATION: 100 %

## 2023-07-18 DIAGNOSIS — Z85.41 HX OF CERVICAL CANCER: Primary | ICD-10-CM

## 2023-07-18 PROCEDURE — 99214 OFFICE O/P EST MOD 30 MIN: CPT | Performed by: NURSE PRACTITIONER

## 2023-07-18 PROCEDURE — 88175 CYTOPATH C/V AUTO FLUID REDO: CPT | Performed by: NURSE PRACTITIONER

## 2023-07-18 PROCEDURE — G0463 HOSPITAL OUTPT CLINIC VISIT: HCPCS | Performed by: NURSE PRACTITIONER

## 2023-07-18 ASSESSMENT — PAIN SCALES - GENERAL: PAINLEVEL: NO PAIN (0)

## 2023-07-18 NOTE — NURSING NOTE
"Oncology Rooming Note    July 18, 2023 1:28 PM   Erika Ziegler is a 37 year old female who presents for:    Chief Complaint   Patient presents with     Oncology Clinic Visit     Hx of cervical cancer        Initial Vitals: BP (!) 165/95 (BP Location: Right arm, Patient Position: Sitting, Cuff Size: Adult Regular)   Pulse 93   Temp 98.3  F (36.8  C) (Oral)   Wt 65.8 kg (145 lb)   SpO2 100%   BMI 21.73 kg/m   Estimated body mass index is 21.73 kg/m  as calculated from the following:    Height as of 6/15/22: 1.74 m (5' 8.5\").    Weight as of this encounter: 65.8 kg (145 lb). Body surface area is 1.78 meters squared.  No Pain (0) Comment: Data Unavailable   No LMP recorded. (Menstrual status: Birth Control).  Allergies reviewed: Yes  Medications reviewed: Yes    Medications: Medication refills not needed today.  Pharmacy name entered into [facility]: [id number] - [place], [place] - [address] [place] [address]    Clinical concerns: none       Jody Ellison, [facility]            "

## 2023-07-18 NOTE — LETTER
2023         RE: Erika Ziegler   [facility] [address]        Dear Colleague,    Thank you for referring your patient, Erika Ziegler, to the [facility]. Please see a copy of my visit note below.                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 She is here today for a surveillance visit and annual pap.      Oncology history:   Erika had some post coital bleeding and was seen by Dr. Silva for her annual visit. On pelvic examination she was noted to have a friable cervical mass for which a pap smear was obtained. She then underwent a colposcopy with biopsies taken with above diagnosis made. She would like to have children in the future and the current plan is to preserve her ovaries and undergo oocyte and/embryo preservation with surrogate carrier.   Pap/colpo history:   5/10/4: NIL   05: LSIL   3/2006 colpo with mild dysplasia   06: LSIL pap   07: ASCUS +HPV   07: LSIL   2008: colpo with mild dysplasia   08 & 2009: LSIL   2009: ANDRZEJ I-II   3/2010: colpo ANDRZEJ I   11/5/10, 11, 12: NIL   13: ASC-H, JERRI   2013: colpo with poorly differentiated carcinoma with neuroendocrine differentiation and partial tumor necrosis   13: PET CT with 6.5 x 5.2 cm cervical mass; tiny focus of increased metabolism in the right midpelvis laterally. The ureter at this level is difficult to follow. This may represent some activity in the distal ureter through it is difficult to completely exclude activity in a small non enlarged pelvic lymph node. Chest lear, no other evidence of mets.    5/1/13: Met with reproductive endocrinology to discuss fertility options. Per their recommendation, given the cervical cancer diagnosis and the size of the lesion, the patient is not a suitable candidate for transvaginal oocyte retrieval. Transabdominal ultrasound was performed in addition to transvaginal ultrasound, to evaluate for possible transabdominal oocyte retrieval, with ovaries positioned low in the pelvis precluding safe transabdominal oocyte retrieval. Discussed option of ovarian tissue cryopreservation and ovarian translocation prior to radiation therapy.   5/1/13: MR PELVIS IMPRESSION:  1. 5.1 x 3.1 x 5.9 cm enhancing cervical mass extending of the posterior aspect of the cervix not involving the vagina or uterus no evidence of parametrial spread.  2. Two small nonspecific pelvic lymph nodes, 1.0 x 0.6 cm left pelvic lymph node series 6 image 6, 0.6 x 1.0 cm right pelvic lymph node on image 6.  5/13/13: laparoscopy, left ovarian transposition, right salpingo oophorectomy (reproductive medicine taking right ovary after surgery)   5/20/13-6/17/13: Cycle #1-3 Etoposide/Cisplatin; began EBRT   6/24/13: Insertion of High Dose Rate Tandem and Ring for Iridium-192 implant. Pt tolerated well.   7/9/13: Completed brachytherapy  8/2/13-8/21/13: Cycle #1-2 Taxol/Carbo  9/11/13 PET/CT IMPRESSION:  1. No evidence of FDG avid malignancy in the neck, chest, abdomen, or pelvis.  2. Inflammatory changes in the presacral and perirectal spaces, likely post radiation change.  9/13/13-10/4/13: Cycle #3-4 Taxol/Carbo  12/16/13: PET/CT IMPRESSION:  1. No evidence of hypermetabolic activity within the neck, chest, abdomen, or pelvis to suggest active or metastatic disease.  2. Persistent inflammatory changes within the low pelvis, most compatible with posttreatment change, without associated hypermetabolic activity to suggest local recurrence.  12/18/13: recovering relatively well from treatment. She still has neuropathy in  "her feet. It is constant, annoying tingling and numbness in her toes. She has some relief with gabapentin and B6/L-glutamine. She also still has some \"digestive upset\" since finishing radiation therapy, has diarrhea especially in the morning. She also had some chest discomfort last week, but this has since resolved and she attributes it to anxiety after meeting a patient with similar cancer to hers that has metastasized to lungs. She also continues to have some bleeding and discomfort with use of vaginal dilator. She still takes OCPs and has NOT been skipping sugar pill week. She does not feel she experiences any menopausal symptoms on these off weeks but has also not been monitoring it closely. Amenorrhea still.   3/17/14: CT C/A/P IMPRESSION: No CT scan findings in the chest, abdomen, or pelvis to indicate recurrent or metastatic tumor. Unchanged mild free fluid in the pelvis.  3/19/14 pap NIL  6/10/14: CT C/A/P Impression:   1. No evidence of recurrent or metastatic cervical cancer in the chest, abdomen, or pelvis.   2. New subtle wall thickening of the small bowel loops in the low abdomen, likely sequelae of prior radiation.   3. Stable nonspecific pulmonary nodules measuring up to 3 mm since at least 9/11/2013. Continued followup recommended until 12 month stability is documented. No new pulmonary nodule.   6/11/14: Pap NIL.  9/2/14: Pap NIL, HPV negative. CT cap showed: No evidence of recurrence or metastatic lesion in chest, abdomen, or pelvis. Stable sub-4 mm pulmonary nodules.  12/2/14: Pap LSIL, HPV negative. CT cap showed: Impression:   1. No evidence of recurrent local or metastatic disease in the chest, abdomen, or pelvis.  2. Stable sub-4 mm pulmonary nodule on the left. No new pulmonary nodules.  2/17/15: CT C/A/P: IMPRESSION:   1. Left lower lobe 2 mm nodule unchanged since initial imaging on 9/11/2013.  2. No evidence of recurrent local or metastatic disease in the chest, abdomen, or " pelvis.  3/5/15: Pap ASC-H, LSIL also present, HPV 18 postive.    4/4/15: ED visit for 4 days of abdominal pain, increasing in severity with increased frequency of urination and bowel movements over the past 2 days as well. She also complains of abdominal distention. Gyn onc consult in hospital did not recommend CT at that time unless symptoms worsen. Discharged same day.   XRay abdomen: Nonobstructive bowel gas pattern. No free intraperitoneal air.      4/16/15: Colpo done. No aceto-white changes identified. No biopsies done. Plan to repeat pap in June as well as CT.       6/9/15: CT cap verbal preliminary report by Dr. Eric is no change in pulmonary nodules and no evidence of recurrence/metastatic disease. Pap pending.       Addendum: CT cap IMPRESSION:   1. Indeterminate 12 mm hypodensity within the uterine fundus may represent fluid within the endometrial canal secondary to cervical stenosis. Underlying uterine lesion not excluded. Initially, a dedicated pelvic ultrasound is recommended for further assessment.  2. No evidence of metastatic disease.  3. 2 mm nodules in the left lung are unchanged since at least 9/11/2013, and are statistically benign.       Plan for U/S for further evaluation of uterus.      7/2/15: U/S results pending. Verbal report per Dr. Guajardo shows a solid fibroid like area which is not fluid; recommend f/u with additional imaging.       U/S final IMPRESSION:   1. Relatively well-defined small mixed echogenicity myometrial mass in the uterine fundus. Fibroid could have this appearance, however given patient's history of cervical malignancy and radiation, consider a 3-6 month followup to ensure stability.  2. Ovaries are not identified.  3. Trace free fluid in the pelvis.      9/1/15: CT cap IMPRESSION:    1. No evidence of metastatic disease in the chest, abdomen, or pelvis.  2. Stable uterine fundus hypodensity most consistent with submucosal fibroid as described on pelvic ultrasound  7/2/2015. However, given the patient's history of cervical cancer, short-term followup ultrasound in 3-6 months is recommended.  3. Postsurgical changes of right salpingo-oophorectomy and left  Salpingectomy.      9/1/15: pap NIL, neg HPV.       12/4/15: pelvic ultrasound FINDINGS:  The uterus measures 5.3 x 3.4 x 2.0 cm. The endometrium is within normal limits and measures 2.0 mm. There is no free fluid in the pelvis. Redemonstration of a heterogeneous appearing circumscribed submucosal mass in the uterine fundus measuring 10 x 8 x 6 mm, previously 8 x 13 x 9 mm. Unchanged calcification in the lower uterine segment. No new masses are identified. The right ovary is surgically absent. The left ovary was not visualized. No adnexal masses.  No focal abnormality of the visualized portions of the bladder.  IMPRESSION:    Mild decrease in the submucosal mass in the uterine fundus from 7/2/2015, which most likely represents a benign fibroid. No other suspicious masses are identified.      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       6//2016: Pap NIL. 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      12/6/16: CT cap IMPRESSION:   1. In this patient with history of cervical cancer there is no evidence of recurrent or metastatic disease in the chest, abdomen and pelvis.   2. Tiny pulmonary nodules are unchanged since 9/11/2013.      5/18/17: Pap NIL.  8/14/17: CT cap IMPRESSION: No evidence of recurrent or metastatic disease in the chest, abdomen and pelvis  2/20/18: CT cap IMPRESSION: No evidence of recurrent or metastatic disease in the chest, abdomen or pelvis.  8/13/18: CT cap IMPRESSION: In this  patient with history of neuroendocrine carcinoma of the cervix status post chemotherapy and radiation therapy, there is no evidence of recurrence or metastatic disease in the chest, abdomen, or pelvis.      Per Dr. Salinas, does not need annual imaging.      9/13/18: Pap ASCUS.  9/3/19: Pap NIL.  10/16/2020: Pap NIL  9/20/21: Pap NIL.  9/19/22: Pap NIL.  7/18/23: Pap pending.         Today she comes to clinic feeling well. She denies any vaginal bleeding, no changes in her bowel or bladder habits, no nausea/emesis, no lower extremity edema, and no difficulties eating or sleeping. She denies any abdominal discomfort/bloating, no fevers or chills, and no chest pain or shortness of breath. She has noticed two balding spots on her head; she has had this before and the hair grew back. She has been more sexually active recently and it is going well. She will be seeing her PCP in a few months for her annual exam and will be seen 8/1 regarding hair loss. She is very excited to be nearing her 10 year post treatment and plans a celebration in September.            Review of Systems:    Systemic           no weight changes; no fever; no chills; no night sweats; no appetite changes  Skin           no rashes, or lesions  Eye           no irritation; no changes in vision  Olivia-Laryngeal           no dysphagia; no hoarseness   Pulmonary    no cough; no shortness of breath  Cardiovascular    no chest pain; no palpitations  Gastrointestinal    no diarrhea; no constipation; no abdominal pain; no changes in bowel habits; no blood in stool  Genitourinary   no urinary frequency; no urinary urgency; no dysuria; no pain; no abnormal vaginal discharge; no abnormal vaginal bleeding  Breast    no breast discharge; no breast changes; no breast pain  Musculoskeletal    no myalgias; no arthralgias; no back pain  Psychiatric           no depressed mood; no anxiety    Hematologic              no tender lymph nodes; no noticeable swellings or lumps    Endocrine    no hot flashes; no heat/cold intolerance         Neurological   no tremor; no numbness and tingling; no headaches; no difficulty sleeping      Past Medical History:    Past Medical History:   Diagnosis Date    Abnormal Pap smear 04/2013    Cervix cancer (H) 04/2013    neuroendocrine    Hx of previous reproductive problem 05/2013    Due to cancer treatment    Hypertension A couple years ago- on and off    MALIG NEOPLASM EXOCERVIX 05/14/2013         Past Surgical History:    Past Surgical History:   Procedure Laterality Date    BIOPSY      EXAM UNDER ANESTHESIA, INSERT JOESPH SLEEVE, UTERINE PLACEMENT OF TANDEM AND RING FOR RAD, ULTRASOUND  6/24/2013    Procedure: EXAM UNDER ANESTHESIA, INSERT JOESPH SLEEVE, UTERINE PLACEMENT OF TANDEM AND RING FOR RADIATION, ULTRASOUND GUIDED;  Pelvic Exam, Insert JOESPH Sleeve with Ultrasound Guidance and Place Tandem Ring for High Dose Radiation;  Surgeon: Roxy Brar MD;  Location: [facility] OR    LAPAROSCOPIC SALPINGO-OOPHORECTOMY  5/13/2013    Procedure: LAPAROSCOPIC SALPINGO-OOPHORECTOMY;  Laparoscopy, Left  salpingectomy,Ovarian Transposition, Right Salpingo Oophorectomy , Anesthesia General with block;  Surgeon: Deanna Salinas MD;  Location: [facility] OR    OPEN REDUCTION INTERNAL FIXATION HAND Right 1/7/2020    Procedure: OPEN REDUCTION INTERNAL FIXATION, FRACTURE, HAND;  Surgeon: Luis Kellogg MD;  Location: [facility] OR    wisdom teeth      Z HAND/FINGER SURGERY UNLISTED  January 2020         Health Maintenance Due   Topic Date Due    HEPATITIS B IMMUNIZATION (1 of 3 - 3-dose series) Never done    COVID-19 Vaccine (4 - Moderna series) 02/12/2022    PHQ-2 (once per calendar year)  01/01/2023    YEARLY PREVENTIVE VISIT  06/15/2023    HPV TEST  09/19/2023    PAP  09/19/2023       Current Medications:     Current Outpatient Medications   Medication Sig Dispense Refill    norgestrel-ethinyl estradiol (ELINEST) 0.3-30 MG-MCG tablet Take 1 tablet by mouth daily 84  tablet 4         Allergies:      No Known Allergies     Social History:     Social History     Tobacco Use    Smoking status: Never    Smokeless tobacco: Never    Tobacco comments:     Never been a smoker   Substance Use Topics    Alcohol use: Yes     Comment: Occasional       History   Drug Use No         Family History:     Family History   Problem Relation Age of Onset    Cancer Maternal Grandfather         leukemia    Other Cancer Maternal Grandfather         Grandpa had leukemia in his 80's    Unknown/Adopted Other         Adopted 4/19/2016    Substance Abuse Father         My Dad has now been sober for over 40 years    Unknown/Adopted Other         Adopted 8/1/2019    Breast Cancer No family hx of     Cancer - colorectal No family hx of          Physical Exam:     BP (!) 165/95 (BP Location: Right arm, Patient Position: Sitting, Cuff Size: Adult Regular)   Pulse 93   Temp 98.3  F (36.8  C) (Oral)   Wt 65.8 kg (145 lb)   SpO2 100%   BMI 21.73 kg/m    Body mass index is 21.73 kg/m .    General Appearance: healthy and alert, no distress     HEENT: no thyromegaly, no palpable nodules or masses        Cardiovascular: regular rate and rhythm, no gallops, rubs or murmurs     Respiratory: lungs clear, no rales, rhonchi or wheezes, normal diaphragmatic excursion    Musculoskeletal: extremities non tender and without edema    Skin: no lesions or rashes     Neurological: normal gait, no gross defects     Psychiatric: appropriate mood and affect                               Hematological: normal cervical, supraclavicular and inguinal lymph nodes     Gastrointestinal:       abdomen soft, non-tender, non-distended, no organomegaly or masses    Genitourinary: External genitalia and urethral meatus appears normal.  Vagina is smooth without nodularity or masses.  Cervix flush with vagina; scant spotting with cytobrush.  Bimanual exam reveal no masses, nodularity or fullness.  Recto-vaginal exam confirms these findings.  Pap pending.       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 She is here today for a surveillance visit and annual pap.    30 minutes spent on the date of the encounter doing chart review, history and exam, documentation and further activities as noted above      Plan:     1.)       Continue with annual pelvic exam and pap; today's is pending. Reviewed recommendations from [facility] against performing colposcopy in patients treated for cervical cancer with Pap tests of LSIL or less. Colposcopy for LSIL in this group does not detect recurrence unless there is a visible lesion. Reviewed signs and symptoms for when she should contact the clinic or seek additional care. Patient to contact the clinic with any questions or concerns in the interim.  Answered all of her questions to the best of my ability. Discussed getting a DEXA within the next couple of years (or at 40) for a baseline given radiation.     2.) Genetic risk factors were assessed and the patient does not meet the qualifications for a referral.      3.) Labs and/or tests ordered include:  Pap.      4.) Health maintenance issues addressed today include annual health maintenance and non-gynecologic issues with PCP. Discussed getting a TSH checked regarding her hair loss; may also need to see Dermatology.     AMOL Whittaker, WHNP-BC, ANP-BC  Women's Health Nurse Practitioner  Adult Nurse Practitioner  Division of Gynecologic Oncology      CC  Patient Care Team:  Carolyn Dsouza DO as PCP - General (Family Medicine)

## 2023-07-23 ENCOUNTER — HEALTH MAINTENANCE LETTER (OUTPATIENT)
Age: 38
End: 2023-07-23

## 2023-07-31 ENCOUNTER — NURSE TRIAGE (OUTPATIENT)
Dept: NURSING | Facility: CLINIC | Age: 38
End: 2023-07-31
Payer: COMMERCIAL

## 2023-07-31 NOTE — TELEPHONE ENCOUNTER
Nurse Triage SBAR    Is this a 2nd Level Triage? YES, LICENSED PRACTITIONER REVIEW IS REQUIRED    Situation: Patient calling with Covid-like symptoms, but still testing negative with at-home Covid test.  and children have tested positive. Patient would like to reschedule appointment with PCP tomorrow, but next available appt isn't until end of Sept. Routing to provider to ask 1) if she advises a PCR Covid test and 2) if can she fit patient in sooner than Sept for an office visit .  Consent: not needed    Background:   Patient started experiencing Covid symptoms last Thursday - Sore throat, headache, body aches, but has been testing negative every day. Last test was this morning.  Two kids tested positive for Covid last Wed and  tested positive Sat    Assessment:   Sore throat  Headache  Fatigue    Protocol Recommended Disposition:   Discuss with PCP    Recommendation: Advised patient to wait for a call back after sending message to provider.  Care advice given. Patient verbalized understanding and agreed with plan.     Routed to provider to review and advise. May call patient or send message via [employer].    Dagmar Key RN [facility] 7/31/2023 10:31 AM    Reason for Disposition   [1] COVID-19 infection suspected by caller or triager AND [2] mild symptoms (cough, fever, or others) AND [3] negative COVID-19 rapid test    Additional Information   Negative: MILD difficulty breathing (e.g., minimal/no SOB at rest, SOB with walking, pulse <100)   Negative: Fever > 103 F (39.4 C)   Negative: [1] Fever > 101 F (38.3 C) AND [2] over 60 years of age   Negative: [1] Fever > 100.0 F (37.8 C) AND [2] bedridden (e.g., nursing home patient, CVA, chronic illness, recovering from surgery)   Negative: [1] HIGH RISK for severe COVID complications (e.g., weak immune system, age > 64 years, obesity with BMI of 30 or higher, pregnant, chronic lung disease or other chronic medical condition) AND [2] COVID  symptoms (e.g., cough, fever)  (Exceptions: Already seen by PCP and no new or worsening symptoms.)   Negative: [1] HIGH RISK patient AND [2] influenza is widespread in the community AND [3] ONE OR MORE respiratory symptoms: cough, sore throat, runny or stuffy nose   Negative: [1] HIGH RISK patient AND [2] influenza exposure within the last 7 days AND [3] ONE OR MORE respiratory symptoms: cough, sore throat, runny or stuffy nose   Negative: Oxygen level (e.g., pulse oximetry) 91 to 94 percent   Negative: Chest pain or pressure  (Exception: MILD central chest pain, present only when coughing)   Negative: Patient sounds very sick or weak to the triager   Negative: SEVERE or constant chest pain or pressure  (Exception: Mild central chest pain, present only when coughing.)   Negative: MODERATE difficulty breathing (e.g., speaks in phrases, SOB even at rest, pulse 100-120)   Negative: Headache and stiff neck (can't touch chin to chest)   Negative: Oxygen level (e.g., pulse oximetry) 90 percent or lower   Negative: [1] Diagnosed or suspected COVID-19 AND [2] symptoms lasting 3 or more weeks   Negative: [1] COVID-19 exposure AND [2] no symptoms   Negative: COVID-19 vaccine reaction suspected (e.g., fever, headache, muscle aches) occurring 1 to 3 days after getting vaccine   Negative: COVID-19 vaccine, questions about   Negative: [1] Lives with someone known to have influenza (flu test positive) AND [2] flu-like symptoms (e.g., cough, runny nose, sore throat, SOB; with or without fever)   Negative: [1] Adult with possible COVID-19 symptoms AND [2] triager concerned about severity of symptoms or other causes   Negative: COVID-19 and breastfeeding, questions about   Negative: SEVERE difficulty breathing (e.g., struggling for each breath, speaks in single words)   Negative: Difficult to awaken or acting confused (e.g., disoriented, slurred speech)   Negative: Bluish (or gray) lips or face now   Negative: Shock suspected (e.g.,  cold/pale/clammy skin, too weak to stand, low BP, rapid pulse)   Negative: Sounds like a life-threatening emergency to the triager    Protocols used: Coronavirus (COVID-19) Diagnosed or [id number]

## 2023-07-31 NOTE — TELEPHONE ENCOUNTER
Called and spoke with patient. Advised patient to submit an E-visit for PCR orders. Patient is interested in treatment if she were to test positive and tomorrow with be Day #5 with symptoms. Discussed that typically, PCR results are seen within 48 hours and if she decides to pursue PCR testing it may be worth mentioning to provider to see if test could be ordered as STAT. Patient stated understanding and will discuss this with her , she had no further questions or concerns at this time.    Suze Braswell RN

## 2023-07-31 NOTE — TELEPHONE ENCOUNTER
Patient calling. Patient is positive for COVID. Temperature is 101.3      COVID Positive/Requesting COVID treatment    Patient is positive for COVID and requesting treatment options.    Date of positive COVID test (PCR or at home)? 7/31/2023  Current COVID symptoms: fever or chills, cough, fatigue, muscle or body aches, headache, and sore throat  Date COVID symptoms began: 7/27/2023    Message should be routed to clinic RN pool. Best phone number to use for call back: [phone] OK to leave a detailed message.    Routed to [facility].     Stefanie Valladares RN  [facility]  July 31, 2023, 6:30 PM      Reason for Disposition   [1] COVID-19 diagnosed by positive lab test (e.g., PCR, rapid self-test kit) AND [2] NO symptoms (e.g., cough, fever, others)    Additional Information   Negative: SEVERE difficulty breathing (e.g., struggling for each breath, speaks in single words)   Negative: Difficult to awaken or acting confused (e.g., disoriented, slurred speech)   Negative: Bluish (or gray) lips or face now   Negative: Shock suspected (e.g., cold/pale/clammy skin, too weak to stand, low BP, rapid pulse)   Negative: Sounds like a life-threatening emergency to the triager   Negative: [1] Diagnosed or suspected COVID-19 AND [2] symptoms lasting 3 or more weeks   Negative: [1] COVID-19 exposure AND [2] no symptoms   Negative: COVID-19 vaccine reaction suspected (e.g., fever, headache, muscle aches) occurring 1 to 3 days after getting vaccine   Negative: COVID-19 vaccine, questions about   Negative: [1] Lives with someone known to have influenza (flu test positive) AND [2] flu-like symptoms (e.g., cough, runny nose, sore throat, SOB; with or without fever)   Negative: [1] Adult with possible COVID-19 symptoms AND [2] triager concerned about severity of symptoms or other causes   Negative: COVID-19 and breastfeeding, questions about   Negative: SEVERE or constant chest pain or pressure  (Exception:  Mild central chest pain, present only when coughing.)   Negative: MODERATE difficulty breathing (e.g., speaks in phrases, SOB even at rest, pulse 100-120)   Negative: [1] Headache AND [2] stiff neck (can't touch chin to chest)   Negative: Oxygen level (e.g., pulse oximetry) 90 percent or lower   Negative: Chest pain or pressure   Negative: Patient sounds very sick or weak to the triager   Negative: MILD difficulty breathing (e.g., minimal/no SOB at rest, SOB with walking, pulse <100)   Negative: Fever > 103 F (39.4 C)   Negative: [1] Fever > 101 F (38.3 C) AND [2] age > 60 years   Negative: [1] Fever > 100.0 F (37.8 C) AND [2] bedridden (e.g., nursing home patient, CVA, chronic illness, recovering from surgery)   Negative: [1] HIGH RISK for severe COVID complications (e.g., weak immune system, age > 64 years, obesity with BMI > 25, pregnant, chronic lung disease or other chronic medical condition) AND [2] COVID symptoms (e.g., cough, fever)  (Exceptions: Already seen by PCP and no new or worsening symptoms.)   Negative: [1] HIGH RISK patient AND [2] influenza is widespread in the community AND [3] ONE OR MORE respiratory symptoms: cough, sore throat, runny or stuffy nose   Negative: [1] HIGH RISK patient AND [2] influenza exposure within the last 7 days AND [3] ONE OR MORE respiratory symptoms: cough, sore throat, runny or stuffy nose   Negative: Oxygen level (e.g., pulse oximetry) 91 to 94 percent   Negative: Fever present > 3 days (72 hours)   Negative: [1] Fever returns after gone for over 24 hours AND [2] symptoms worse or not improved   Negative: [1] Continuous (nonstop) coughing interferes with work or school AND [2] no improvement using cough treatment per Care Advice   Negative: [1] COVID-19 infection suspected by caller or triager AND [2] mild symptoms (cough, fever, or others) AND [3] negative COVID-19 rapid test   Negative: Cough present > 3 weeks    Protocols used: Coronavirus (COVID-19) Diagnosed or  [id number]

## 2023-07-31 NOTE — TELEPHONE ENCOUNTER
Patient states that everyone in her immediate family members tested positive her , and 2 children, and that she is feeling terribly ill like she has Covid as well. She is wondering if pcp can order her a PCR test to have done today.She would like to start Paxlovid for treatment if test comes out positive and she said tomorrow would be her last day to start it.    Please advise.    DTM ok

## 2023-08-01 NOTE — TELEPHONE ENCOUNTER
Pt was called and nurse discussed treatment options with pt. Pt symptoms are improving, fever broke last night per pt. Pt was educated that they qualify for Paxlovid treatment but would have to stop taking estradiol hormone replacement for a month after Paxlovid. Pt decided against this as last time they went off they started to enter early menopause. Pt was educated that if symptoms worsen they can give us a call back and we can set up a virtual visit to prescribe alternative treatment options.     Charito Peña RN BSN  [facility]      RN COVID TREATMENT VISIT  08/01/23      The patient has been triaged and does not require a higher level of care.    Erika Ziegler  37 year old  Current weight? 145 lbs    Has the patient been seen by a primary care provider at an [facility] or [facility] within the past two years? Yes.   Have you been in close proximity to/do you have a known exposure to a person with a confirmed case of influenza? No.     General treatment eligibility:  Date of positive COVID test (PCR or at home)?  7/31/23    Are you or have you been hospitalized for this COVID-19 infection? No.   Have you received monoclonal antibodies or antiviral treatment for COVID-19 since this positive test? No.   Do you have any of the following conditions that place you at risk of being very sick from COVID-19?   - Mental health disorders including mood disorders, depression, schizophrenia spectrum disorders   Yes, patient has at least one high risk condition as noted above.     Current COVID symptoms:   - fever or chills  - cough  - fatigue  - muscle or body aches  - headache  - sore throat  Yes. Patient has at least one symptom as selected.     How many days since symptoms started? 5 days or less. Established patient, 12 years or older weighing at least 88.2 lbs, who has symptoms that started in the past 5 days, has not been hospitalized nor received treatment already,  and is at risk for being very sick from COVID-19.     Treatment eligibility by RN:  Are you currently pregnant or nursing? No  Do you have a clinically significant hypersensitivity to nirmatrelvir or ritonavir, or toxic epidermal necrolysis (TEN) or Merlos-Duane Syndrome? No  Do you have a history of hepatitis, any hepatic impairment on the Problem List (such as Child-Rao Class C, cirrhosis, fatty liver disease, alcoholic liver disease), or was the last liver lab (hepatic panel, ALT, AST, ALK Phos, bilirubin) elevated in the past 6 months? No  Do you have any history of severe renal impairment (eGFR < 30mL/min)? No    Is patient eligible to continue? Yes, patient meets all eligibility requirements for the RN COVID treatment (as denoted by all no responses above).     Current Outpatient Medications   Medication Sig Dispense Refill    norgestrel-ethinyl estradiol (ELINEST) 0.3-30 MG-MCG tablet Take 1 tablet by mouth daily 84 tablet 4       Medications from List 1 of the standing order (on medications that exclude the use of Paxlovid) that patient is taking: NONE. Is patient taking Yoana's Wort? No  Is patient taking [place]'s Wort or any meds from List 1? No.   Medications from List 2 of the standing order (on meds that provider needs to adjust) that patient is taking: NONE. Is patient on any of the meds from List 2? No.   Medications from List 3 of standing order (on meds that a RN needs to adjust) that patient is taking: ethinyl estradiol/ethinyl estradiol containing meds (most common - Apri,Aviane, Ortho Cyclen, Ortho Tri Cyclen, Sprintec, Tri-Sprintec, Cryselle): Instructed patient to use a back-up method of birth control or abstain from intercourse while on Paxlovid until both one month after completion of Paxlovid and a new pill pack starts.  Is patient on any meds from List 3? Yes. Patient is on meds from list 3. No meds require a provider visit and at least one med required RN to adjust.     Paxlovid  has an approximate 90% reduction in hospitalization. Paxlovid can possibly cause altered sense of taste, diarrhea (loose, watery stools), high blood pressure, muscle aches.     Would patient like a Paxlovid prescription?   No. Patient informed there are no other treatment options at this time.   Charito Peña RN

## 2023-08-22 ENCOUNTER — OFFICE VISIT (OUTPATIENT)
Dept: FAMILY MEDICINE | Facility: CLINIC | Age: 38
End: 2023-08-22
Payer: COMMERCIAL

## 2023-08-22 VITALS
BODY MASS INDEX: 21.5 KG/M2 | DIASTOLIC BLOOD PRESSURE: 83 MMHG | OXYGEN SATURATION: 99 % | SYSTOLIC BLOOD PRESSURE: 132 MMHG | HEIGHT: 69 IN | TEMPERATURE: 98.5 F | WEIGHT: 145.13 LBS | RESPIRATION RATE: 12 BRPM | HEART RATE: 83 BPM

## 2023-08-22 DIAGNOSIS — R79.89 ELEVATED FERRITIN: ICD-10-CM

## 2023-08-22 DIAGNOSIS — E53.8 VITAMIN B12 DEFICIENCY (NON ANEMIC): ICD-10-CM

## 2023-08-22 DIAGNOSIS — L63.9 ALOPECIA AREATA: Primary | ICD-10-CM

## 2023-08-22 DIAGNOSIS — E28.39 PREMATURE OVARIAN FAILURE: ICD-10-CM

## 2023-08-22 DIAGNOSIS — L65.9 HAIR LOSS: ICD-10-CM

## 2023-08-22 LAB
ERYTHROCYTE [DISTWIDTH] IN BLOOD BY AUTOMATED COUNT: 12.8 % (ref 10–15)
FERRITIN SERPL-MCNC: 489 NG/ML (ref 6–175)
HCT VFR BLD AUTO: 38.5 % (ref 35–47)
HGB BLD-MCNC: 13.1 G/DL (ref 11.7–15.7)
MCH RBC QN AUTO: 31.3 PG (ref 26.5–33)
MCHC RBC AUTO-ENTMCNC: 34 G/DL (ref 31.5–36.5)
MCV RBC AUTO: 92 FL (ref 78–100)
PLATELET # BLD AUTO: 219 10E3/UL (ref 150–450)
RBC # BLD AUTO: 4.18 10E6/UL (ref 3.8–5.2)
TSH SERPL DL<=0.005 MIU/L-ACNC: 1.73 UIU/ML (ref 0.3–4.2)
VIT B12 SERPL-MCNC: 171 PG/ML (ref 232–1245)
WBC # BLD AUTO: 6.3 10E3/UL (ref 4–11)

## 2023-08-22 PROCEDURE — 82607 VITAMIN B-12: CPT | Performed by: FAMILY MEDICINE

## 2023-08-22 PROCEDURE — 83550 IRON BINDING TEST: CPT | Performed by: FAMILY MEDICINE

## 2023-08-22 PROCEDURE — 85027 COMPLETE CBC AUTOMATED: CPT | Performed by: FAMILY MEDICINE

## 2023-08-22 PROCEDURE — 82728 ASSAY OF FERRITIN: CPT | Performed by: FAMILY MEDICINE

## 2023-08-22 PROCEDURE — 83540 ASSAY OF IRON: CPT | Performed by: FAMILY MEDICINE

## 2023-08-22 PROCEDURE — 99213 OFFICE O/P EST LOW 20 MIN: CPT | Performed by: FAMILY MEDICINE

## 2023-08-22 PROCEDURE — 36415 COLL VENOUS BLD VENIPUNCTURE: CPT | Performed by: FAMILY MEDICINE

## 2023-08-22 PROCEDURE — 84443 ASSAY THYROID STIM HORMONE: CPT | Performed by: FAMILY MEDICINE

## 2023-08-22 RX ORDER — MINOXIDIL 5 %
SOLUTION, NON-ORAL TOPICAL 2 TIMES DAILY
Qty: 60 ML | Refills: 2 | Status: SHIPPED | OUTPATIENT
Start: 2023-08-22 | End: 2024-07-30

## 2023-08-22 RX ORDER — NORGESTREL AND ETHINYL ESTRADIOL 0.3-0.03MG
1 KIT ORAL DAILY
Qty: 84 TABLET | Refills: 4 | Status: SHIPPED | OUTPATIENT
Start: 2023-08-22

## 2023-08-22 ASSESSMENT — ANXIETY QUESTIONNAIRES
1. FEELING NERVOUS, ANXIOUS, OR ON EDGE: NOT AT ALL
GAD7 TOTAL SCORE: 0
4. TROUBLE RELAXING: NOT AT ALL
5. BEING SO RESTLESS THAT IT IS HARD TO SIT STILL: NOT AT ALL
GAD7 TOTAL SCORE: 0
3. WORRYING TOO MUCH ABOUT DIFFERENT THINGS: NOT AT ALL
IF YOU CHECKED OFF ANY PROBLEMS ON THIS QUESTIONNAIRE, HOW DIFFICULT HAVE THESE PROBLEMS MADE IT FOR YOU TO DO YOUR WORK, TAKE CARE OF THINGS AT HOME, OR GET ALONG WITH OTHER PEOPLE: NOT DIFFICULT AT ALL
7. FEELING AFRAID AS IF SOMETHING AWFUL MIGHT HAPPEN: NOT AT ALL
2. NOT BEING ABLE TO STOP OR CONTROL WORRYING: NOT AT ALL
6. BECOMING EASILY ANNOYED OR IRRITABLE: NOT AT ALL

## 2023-08-22 ASSESSMENT — PATIENT HEALTH QUESTIONNAIRE - PHQ9
SUM OF ALL RESPONSES TO PHQ QUESTIONS 1-9: 0
SUM OF ALL RESPONSES TO PHQ QUESTIONS 1-9: 0
10. IF YOU CHECKED OFF ANY PROBLEMS, HOW DIFFICULT HAVE THESE PROBLEMS MADE IT FOR YOU TO DO YOUR WORK, TAKE CARE OF THINGS AT HOME, OR GET ALONG WITH OTHER PEOPLE: NOT DIFFICULT AT ALL

## 2023-08-22 NOTE — PROGRESS NOTES
Assessment & Plan     1. Alopecia areata  2. Hair loss  - TSH with free T4 reflex; Future  - Ferritin; Future  - Vitamin B12; Future  - CBC with platelets; Future  - minoxidil (MINOXIDIL FOR WOMEN) 2 % external solution; Apply topically 2 times daily  Dispense: 60 mL; Refill: 2  - Adult Dermatology Referral; Future    Symptoms consistent with alopecia areata, nonscarring.  She does appear to still have hair follicles present though not actively regrowing.  We will rule out thyroid disease, anemia, B12 deficiency, iron deficiency.  Continue with multivitamin.  We will trial topical minoxidil to help promote regrowth. Dermatology referral for further evaluation, consider focal steroid injections.     3. Premature ovarian failure  - norgestrel-ethinyl estradiol (ELINEST) 0.3-30 MG-MCG tablet; Take 1 tablet by mouth daily  Dispense: 84 tablet; Refill: 4      Refill OCPs for management of symptoms related to premature ovarian failure.    Carolyn Dsouza [facility]    Justice James is a 37 year old, presenting for the following health issues:  Hair Loss      8/22/2023     1:45 PM   Additional Questions   Roomed by Bailey HANDY CMA   Accompanied by Self       History of Present Illness       Reason for visit:  Hair loss  Symptom onset:  More than a month  Symptoms include:  Hair loss, bald spots.  Symptom intensity:  Moderate  Symptom progression:  Staying the same  Had these symptoms before:  Yes  Has tried/received treatment for these symptoms:  No  What makes it better:  Putting make-up on the spots so you can't see as much.    She eats 4 or more servings of fruits and vegetables daily.She consumes 1 sweetened beverage(s) daily.She exercises with enough effort to increase her heart rate 20 to 29 minutes per day.  She exercises with enough effort to increase her heart rate 4 days per week. She is missing 2 dose(s) of medications per week.  She is not taking prescribed  "medications regularly due to remembering to take.           4/18/2018    11:24 AM 8/22/2023     1:37 PM   PHQ   PHQ-9 Total Score 0 0   Q9: Thoughts of better off dead/self-harm past 2 weeks Not at all Not at all         11/22/2019     1:49 PM 12/4/2019     2:59 PM 8/22/2023     1:38 PM   VIRGILIO-7 SCORE   Total Score 0 (minimal anxiety)  0 (minimal anxiety)   Total Score 0 0 0     Had a patch at beginning of pandemic, was small, didn't enlarge. Has recurred. Thought stress related.   A few months ago, noticed a similar thing. Has been putting make up on spots to try and blend. A few patches. Hasn't noticed any regrowth, doesn't remember how long took to come back. Were enlarging, thinks has stabilized.      Review of Systems   See HPI above.       Objective    /88 (BP Location: Left arm, Patient Position: Sitting, Cuff Size: Adult Regular)   Pulse 78   Temp 98.5  F (36.9  C) (Oral)   Resp 12   Ht 1.74 m (5' 8.5\")   Wt 65.8 kg (145 lb 2 oz)   LMP  (LMP Unknown)   SpO2 99%   BMI 21.75 kg/m    Body mass index is 21.75 kg/m .  Physical Exam   GENERAL: Erika is a pleasant, nontoxic female, no acute distress.  HEENT: Focal patches of hair loss on scalp, most predominant top of head and right temporal scalp.                  "

## 2023-08-24 PROBLEM — R79.89 ELEVATED FERRITIN: Status: ACTIVE | Noted: 2023-08-24

## 2023-08-24 PROBLEM — E53.8 VITAMIN B12 DEFICIENCY (NON ANEMIC): Status: ACTIVE | Noted: 2023-08-24

## 2023-08-24 LAB
IRON BINDING CAPACITY (ROCHE): 387 UG/DL (ref 240–430)
IRON SATN MFR SERPL: 33 % (ref 15–46)
IRON SERPL-MCNC: 127 UG/DL (ref 37–145)

## 2023-08-24 RX ORDER — LANOLIN ALCOHOL/MO/W.PET/CERES
1000 CREAM (GRAM) TOPICAL DAILY
Qty: 90 TABLET | Refills: 3 | Status: SHIPPED | OUTPATIENT
Start: 2023-08-24

## 2023-08-25 NOTE — RESULT ENCOUNTER NOTE
Please connect with Erika and see if there is a held time to schedule her for follow up to assess her elevated ferritin level available before her scheduled physical 10/17/23. Etiology of elevated ferritin level unclear and needs timely follow up.    Patient updated by [employer] message with lab results.       Nicky James,  Your iron panel has returned and is NOT consistent with iron overload.   This does warrant further work up to look into the underlying cause of this elevation.   It may be related to whatever underlying inflammatory process is causing your hair loss, though we need to exclude other possibilities. Please proceed with treatment plan as outlined in clinic. If you are consuming any alcohol, please maintain abstinence from alcohol until you are re-evaluated.   We can work up further at upcoming appointment on 10/17/23. I will ask my staff to reach out to see if any earlier times are available.  Carolyn Dsouza, DO

## 2023-08-25 NOTE — RESULT ENCOUNTER NOTE
Patient updated by [employer] message with lab results.       Nicky James,  1. Your CBC is normal. You are not anemic.   2. Your ferritin level is actually high. I am adding on the iron panel to better evaluate this. You are not iron deficient.  3. Your B12 level is low. I have sent in a prescription for an oral B12 supplement to take once daily. We should recheck labs in 8 weeks. Please schedule a lab only appointment at your convenience.  Carolyn Dsouza, DO

## 2023-08-27 ENCOUNTER — MYC MEDICAL ADVICE (OUTPATIENT)
Dept: FAMILY MEDICINE | Facility: CLINIC | Age: 38
End: 2023-08-27
Payer: COMMERCIAL

## 2023-08-27 DIAGNOSIS — R79.89 ELEVATED FERRITIN: Primary | ICD-10-CM

## 2023-08-28 NOTE — TELEPHONE ENCOUNTER
1. Elevated ferritin  - Comprehensive metabolic panel (BMP + Alb, Alk Phos, ALT, AST, Total. Bili, TP); Future     Carolyn Dsouza, DO

## 2023-08-28 NOTE — TELEPHONE ENCOUNTER
See [employer] message, pt requesting labs to check liver function.    Stefanie Nelson RN  [facility]

## 2023-08-29 ENCOUNTER — LAB (OUTPATIENT)
Dept: LAB | Facility: CLINIC | Age: 38
End: 2023-08-29
Payer: COMMERCIAL

## 2023-08-29 DIAGNOSIS — R79.89 ELEVATED FERRITIN: ICD-10-CM

## 2023-08-29 LAB
ALBUMIN SERPL BCG-MCNC: 4.5 G/DL (ref 3.5–5.2)
ALP SERPL-CCNC: 34 U/L (ref 35–104)
ALT SERPL W P-5'-P-CCNC: 33 U/L (ref 0–50)
ANION GAP SERPL CALCULATED.3IONS-SCNC: 11 MMOL/L (ref 7–15)
AST SERPL W P-5'-P-CCNC: 29 U/L (ref 0–45)
BILIRUB SERPL-MCNC: 0.6 MG/DL
BUN SERPL-MCNC: 7.2 MG/DL (ref 6–20)
CALCIUM SERPL-MCNC: 9.4 MG/DL (ref 8.6–10)
CHLORIDE SERPL-SCNC: 104 MMOL/L (ref 98–107)
CREAT SERPL-MCNC: 0.74 MG/DL (ref 0.51–0.95)
DEPRECATED HCO3 PLAS-SCNC: 22 MMOL/L (ref 22–29)
GFR SERPL CREATININE-BSD FRML MDRD: >90 ML/MIN/1.73M2
GLUCOSE SERPL-MCNC: 91 MG/DL (ref 70–99)
POTASSIUM SERPL-SCNC: 4.1 MMOL/L (ref 3.4–5.3)
PROT SERPL-MCNC: 7 G/DL (ref 6.4–8.3)
SODIUM SERPL-SCNC: 137 MMOL/L (ref 136–145)

## 2023-08-29 PROCEDURE — 80053 COMPREHEN METABOLIC PANEL: CPT

## 2023-08-29 PROCEDURE — 36415 COLL VENOUS BLD VENIPUNCTURE: CPT

## 2023-08-30 NOTE — RESULT ENCOUNTER NOTE
Patient updated by [employer] message with lab results.      Nicky Huddleston,  Labs look fine.  We can discuss further at your next appointment.  Carolyn Dsouza, DO

## 2023-10-04 ENCOUNTER — TRANSFERRED RECORDS (OUTPATIENT)
Dept: HEALTH INFORMATION MANAGEMENT | Facility: CLINIC | Age: 38
End: 2023-10-04
Payer: COMMERCIAL

## 2023-10-13 ASSESSMENT — ENCOUNTER SYMPTOMS
DIZZINESS: 0
FREQUENCY: 0
COUGH: 0
FEVER: 0
NAUSEA: 0
PARESTHESIAS: 1
CONSTIPATION: 0
DYSURIA: 0
SHORTNESS OF BREATH: 0
MYALGIAS: 0
WEAKNESS: 0
BREAST MASS: 0
JOINT SWELLING: 0
HEMATOCHEZIA: 0
CHILLS: 0
ARTHRALGIAS: 0
NERVOUS/ANXIOUS: 0
PALPITATIONS: 0
HEADACHES: 0
SORE THROAT: 0
EYE PAIN: 0
HEMATURIA: 0
HEARTBURN: 0
DIARRHEA: 0
ABDOMINAL PAIN: 0

## 2023-10-17 ENCOUNTER — OFFICE VISIT (OUTPATIENT)
Dept: FAMILY MEDICINE | Facility: CLINIC | Age: 38
End: 2023-10-17
Payer: COMMERCIAL

## 2023-10-17 VITALS
DIASTOLIC BLOOD PRESSURE: 84 MMHG | HEART RATE: 73 BPM | RESPIRATION RATE: 16 BRPM | OXYGEN SATURATION: 100 % | WEIGHT: 145.13 LBS | HEIGHT: 69 IN | BODY MASS INDEX: 21.5 KG/M2 | SYSTOLIC BLOOD PRESSURE: 134 MMHG | TEMPERATURE: 98.1 F

## 2023-10-17 DIAGNOSIS — R79.89 ELEVATED FERRITIN: ICD-10-CM

## 2023-10-17 DIAGNOSIS — E53.8 VITAMIN B12 DEFICIENCY (NON ANEMIC): ICD-10-CM

## 2023-10-17 DIAGNOSIS — Z13.220 LIPID SCREENING: ICD-10-CM

## 2023-10-17 DIAGNOSIS — Z23 IMMUNIZATION DUE: ICD-10-CM

## 2023-10-17 DIAGNOSIS — Z00.00 ANNUAL PHYSICAL EXAM: Primary | ICD-10-CM

## 2023-10-17 PROCEDURE — 80061 LIPID PANEL: CPT | Performed by: FAMILY MEDICINE

## 2023-10-17 PROCEDURE — 82607 VITAMIN B-12: CPT | Performed by: FAMILY MEDICINE

## 2023-10-17 PROCEDURE — 36415 COLL VENOUS BLD VENIPUNCTURE: CPT | Performed by: FAMILY MEDICINE

## 2023-10-17 PROCEDURE — 82728 ASSAY OF FERRITIN: CPT | Performed by: FAMILY MEDICINE

## 2023-10-17 PROCEDURE — 90471 IMMUNIZATION ADMIN: CPT | Performed by: FAMILY MEDICINE

## 2023-10-17 PROCEDURE — 90686 IIV4 VACC NO PRSV 0.5 ML IM: CPT | Performed by: FAMILY MEDICINE

## 2023-10-17 PROCEDURE — 99212 OFFICE O/P EST SF 10 MIN: CPT | Mod: 25 | Performed by: FAMILY MEDICINE

## 2023-10-17 PROCEDURE — 99395 PREV VISIT EST AGE 18-39: CPT | Mod: 25 | Performed by: FAMILY MEDICINE

## 2023-10-17 RX ORDER — CLOBETASOL PROPIONATE 0.5 MG/ML
SOLUTION TOPICAL
COMMUNITY
Start: 2023-10-04

## 2023-10-17 ASSESSMENT — ENCOUNTER SYMPTOMS
NAUSEA: 0
ARTHRALGIAS: 0
ABDOMINAL PAIN: 0
HEMATOCHEZIA: 0
CONSTIPATION: 0
FEVER: 0
BREAST MASS: 0
EYE PAIN: 0
JOINT SWELLING: 0
CHILLS: 0
PARESTHESIAS: 1
HEADACHES: 0
SHORTNESS OF BREATH: 0
DIZZINESS: 0
WEAKNESS: 0
SORE THROAT: 0
DIARRHEA: 0
HEARTBURN: 0
PALPITATIONS: 0
MYALGIAS: 0
COUGH: 0
NERVOUS/ANXIOUS: 0
HEMATURIA: 0
DYSURIA: 0
FREQUENCY: 0

## 2023-10-17 ASSESSMENT — PATIENT HEALTH QUESTIONNAIRE - PHQ9
SUM OF ALL RESPONSES TO PHQ QUESTIONS 1-9: 0
10. IF YOU CHECKED OFF ANY PROBLEMS, HOW DIFFICULT HAVE THESE PROBLEMS MADE IT FOR YOU TO DO YOUR WORK, TAKE CARE OF THINGS AT HOME, OR GET ALONG WITH OTHER PEOPLE: NOT DIFFICULT AT ALL
SUM OF ALL RESPONSES TO PHQ QUESTIONS 1-9: 0

## 2023-10-17 ASSESSMENT — ANXIETY QUESTIONNAIRES
1. FEELING NERVOUS, ANXIOUS, OR ON EDGE: NOT AT ALL
GAD7 TOTAL SCORE: 0
6. BECOMING EASILY ANNOYED OR IRRITABLE: NOT AT ALL
3. WORRYING TOO MUCH ABOUT DIFFERENT THINGS: NOT AT ALL
IF YOU CHECKED OFF ANY PROBLEMS ON THIS QUESTIONNAIRE, HOW DIFFICULT HAVE THESE PROBLEMS MADE IT FOR YOU TO DO YOUR WORK, TAKE CARE OF THINGS AT HOME, OR GET ALONG WITH OTHER PEOPLE: NOT DIFFICULT AT ALL
7. FEELING AFRAID AS IF SOMETHING AWFUL MIGHT HAPPEN: NOT AT ALL
2. NOT BEING ABLE TO STOP OR CONTROL WORRYING: NOT AT ALL
GAD7 TOTAL SCORE: 0
5. BEING SO RESTLESS THAT IT IS HARD TO SIT STILL: NOT AT ALL
4. TROUBLE RELAXING: NOT AT ALL

## 2023-10-17 NOTE — PROGRESS NOTES
SUBJECTIVE:   CC: Erika is an 37 year old who presents for preventive health visit.       10/17/2023     1:42 PM   Additional Questions   Roomed by Bailey HANDY CMA   Accompanied by Self       Healthy Habits:     Getting at least 3 servings of Calcium per day:  Yes    Bi-annual eye exam:  Yes    Dental care twice a year:  NO    Sleep apnea or symptoms of sleep apnea:  None    Diet:  Regular (no restrictions)    Frequency of exercise:  4-5 days/week    Duration of exercise:  45-60 minutes    Taking medications regularly:  Yes    Medication side effects:  None    Additional concerns today:  No      Today's PHQ-9 Score:       10/17/2023     1:36 PM   PHQ-9 SCORE   PHQ-9 Total Score MyChart 0   PHQ-9 Total Score 0     HAIR LOSS: Improving, continuing minoxidil, adding in topical steroid through derm, seeing response.       OVARIAN INSUFFICIENCY: On OCPs    HEALTH MAINTENANCE:   - flu: will do  - covid: declines  - Hep B: will review info sheet     The ASCVD Risk score (Bora CRUM, et al., 2019) failed to calculate for the following reasons:    The 2019 ASCVD risk score is only valid for ages 40 to 79     Lab Results   Component Value Date    CHOL 174 11/08/2018     Lab Results   Component Value Date    HDL 77 11/08/2018     Lab Results   Component Value Date    LDL 82 11/08/2018     Lab Results   Component Value Date    TRIG 72 11/08/2018         Social History     Tobacco Use    Smoking status: Never     Passive exposure: Never    Smokeless tobacco: Never    Tobacco comments:     Never been a smoker   Substance Use Topics    Alcohol use: Yes     Comment: Occasional             10/13/2023     9:54 AM   Alcohol Use   Prescreen: >3 drinks/day or >7 drinks/week? No     Reviewed orders with patient.  Reviewed health maintenance and updated orders accordingly - Yes      Breast Cancer Screening:    FHS-7:        No data to display                Pertinent mammograms are reviewed under the imaging tab.    History of  "abnormal Pap smear: NO - age 30- 65 PAP every 3 years recommended      7/18/2023     2:36 PM 9/19/2022    10:35 AM 9/20/2021    10:00 AM   PAP / HPV   PAP Negative for Intraepithelial Lesion or Malignancy (NILM)  Negative for Intraepithelial Lesion or Malignancy (NILM)  Negative for Intraepithelial Lesion or Malignancy (NILM)      Reviewed and updated as needed this visit by clinical staff   Tobacco  Allergies  Meds  Problems             Reviewed and updated as needed this visit by Provider   Tobacco  Allergies  Meds  Problems  Med Hx  Surg Hx  Fam Hx             Review of Systems   Constitutional:  Negative for chills and fever.   HENT:  Negative for congestion, ear pain, hearing loss and sore throat.    Eyes:  Negative for pain and visual disturbance.   Respiratory:  Negative for cough and shortness of breath.    Cardiovascular:  Negative for chest pain, palpitations and peripheral edema.   Gastrointestinal:  Negative for abdominal pain, constipation, diarrhea, heartburn, hematochezia and nausea.   Breasts:  Positive for tenderness. Negative for breast mass and discharge.   Genitourinary:  Negative for dysuria, frequency, genital sores, hematuria, pelvic pain, urgency, vaginal bleeding and vaginal discharge.   Musculoskeletal:  Negative for arthralgias, joint swelling and myalgias.   Skin:  Negative for rash.   Neurological:  Positive for paresthesias. Negative for dizziness, weakness and headaches.   Psychiatric/Behavioral:  Negative for mood changes. The patient is not nervous/anxious.      BREAST TENDERNESS: no palpable mass, though feels tender, keeps checking.      PARESTHESIAS: mild neuropathy in feet, improved.       OBJECTIVE:   /84 (BP Location: Left arm, Patient Position: Sitting, Cuff Size: Adult Regular)   Pulse 73   Temp 98.1  F (36.7  C) (Oral)   Resp 16   Ht 1.74 m (5' 8.5\")   Wt 65.8 kg (145 lb 2 oz)   LMP  (LMP Unknown)   SpO2 100%   BMI 21.75 kg/m    Physical " Exam  GENERAL: healthy, alert and no distress  EYES: Eyes grossly normal to inspection, PERRL and conjunctivae and sclerae normal  HENT: ear canals and TM's normal, nose and mouth without ulcers or lesions  NECK: no adenopathy, no asymmetry, masses, or scars and thyroid normal to palpation  RESP: lungs clear to auscultation - no rales, rhonchi or wheezes  BREAST: no palpable axillary masses or adenopathy and fibrocystic changes right breast  CV: regular rate and rhythm, normal S1 S2, no S3 or S4, no murmur, click or rub, no peripheral edema and peripheral pulses strong  ABDOMEN: soft, nontender, no hepatosplenomegaly, no masses and bowel sounds normal  MS: no gross musculoskeletal defects noted, no edema  SKIN: no suspicious lesions or rashes  NEURO: Normal strength and tone, mentation intact and speech normal  PSYCH: mentation appears normal, affect normal/bright        ASSESSMENT/PLAN:     1. Annual physical exam  - REVIEW OF HEALTH MAINTENANCE PROTOCOL ORDERS    Reviewed health history and health maintenance recommendations.    2. Elevated ferritin  - Ferritin; Future    Incidental finding last lab when ruling out iron deficiency contributing to hair loss.  Trend labs today.    3. Lipid screening  - Lipid panel reflex to direct LDL Non-fasting; Future    4. Immunization due  - INFLUENZA VACCINE IM > 6 MONTHS VALENT IIV4 (AFLURIA/FLUZONE)     Patient has been advised of split billing requirements and indicates understanding: Yes      COUNSELING:  Reviewed preventive health counseling, as reflected in patient instructions        She reports that she has never smoked. She has never been exposed to tobacco smoke. She has never used smokeless tobacco.          Carolyn Dsouza [facility]

## 2023-10-18 LAB
CHOLEST SERPL-MCNC: 165 MG/DL
FERRITIN SERPL-MCNC: 362 NG/ML (ref 6–175)
HDLC SERPL-MCNC: 53 MG/DL
LDLC SERPL CALC-MCNC: 49 MG/DL
NONHDLC SERPL-MCNC: 112 MG/DL
TRIGL SERPL-MCNC: 316 MG/DL
VIT B12 SERPL-MCNC: 362 PG/ML (ref 232–1245)

## 2023-10-19 NOTE — RESULT ENCOUNTER NOTE
Patient updated by [employer] message with lab results.       Nicky James,  Labs have returned.   1. Cholesterol labs look pretty good. Triglycerides ildly elevaetd, though this is not unexpected with a non-fasting sample. Keep working on healthy habits with a heart healthy and nutrient dense diet along with regular physical activity.  2. Ferritin level still elevated, though trending downwards which is reassuring. I recommend we continue to monitor.  3. B12 level has normalized.   Reach out with follow up questions or concerns.  Carolyn Dsouza, DO

## 2023-11-16 ENCOUNTER — TRANSFERRED RECORDS (OUTPATIENT)
Dept: HEALTH INFORMATION MANAGEMENT | Facility: CLINIC | Age: 38
End: 2023-11-16
Payer: COMMERCIAL

## 2023-12-08 ENCOUNTER — MYC MEDICAL ADVICE (OUTPATIENT)
Dept: FAMILY MEDICINE | Facility: CLINIC | Age: 38
End: 2023-12-08
Payer: COMMERCIAL

## 2024-01-04 ENCOUNTER — TRANSFERRED RECORDS (OUTPATIENT)
Dept: HEALTH INFORMATION MANAGEMENT | Facility: CLINIC | Age: 39
End: 2024-01-04
Payer: COMMERCIAL

## 2024-07-29 NOTE — PROGRESS NOTES
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   She is here today for an annual exam and pap. .     Oncology history:  Erika had some post coital bleeding and was seen by Dr. Silva for her annual visit in . On pelvic examination she was noted to have a friable cervical mass for which a pap smear was obtained. She then underwent a colposcopy with biopsies taken with above diagnosis made. She would like to have children in the future and the current plan is to preserve her ovaries and undergo oocyte and/embryo preservation with surrogate carrier.   Pap/colpo history:   5/10/4: NIL   05: LSIL   3/2006 colpo with mild dysplasia   06: LSIL pap   07: ASCUS +HPV   07: LSIL   2008: colpo with mild dysplasia   08 & 2009: LSIL   2009: ANDRZEJ I-II   3/2010: colpo ANDRZEJ I   11/5/10, 11, 12: NIL   13: ASC-H, JERRI   2013: colpo with poorly differentiated carcinoma with neuroendocrine differentiation and partial tumor necrosis   13: PET CT with 6.5 x 5.2 cm cervical mass; tiny focus of increased metabolism in the right midpelvis laterally. The ureter at this level is difficult to follow. This may represent some activity in the distal ureter through it is difficult to completely exclude activity in a small non enlarged pelvic lymph node. Chest lear, no other evidence of mets.   13: Met with reproductive endocrinology to discuss fertility options. Per their recommendation, given the cervical cancer diagnosis and the size of the lesion, the patient is not a suitable candidate for transvaginal oocyte retrieval. Transabdominal ultrasound was performed in  "addition to transvaginal ultrasound, to evaluate for possible transabdominal oocyte retrieval, with ovaries positioned low in the pelvis precluding safe transabdominal oocyte retrieval. Discussed option of ovarian tissue cryopreservation and ovarian translocation prior to radiation therapy.   5/1/13: MR PELVIS IMPRESSION:  1. 5.1 x 3.1 x 5.9 cm enhancing cervical mass extending of the posterior aspect of the cervix not involving the vagina or uterus no evidence of parametrial spread.  2. Two small nonspecific pelvic lymph nodes, 1.0 x 0.6 cm left pelvic lymph node series 6 image 6, 0.6 x 1.0 cm right pelvic lymph node on image 6.  5/13/13: laparoscopy, left ovarian transposition, right salpingo oophorectomy (reproductive medicine taking right ovary after surgery)   5/20/13-6/17/13: Cycle #1-3 Etoposide/Cisplatin; began EBRT   6/24/13: Insertion of High Dose Rate Tandem and Ring for Iridium-192 implant. Pt tolerated well.   7/9/13: Completed brachytherapy  8/2/13-8/21/13: Cycle #1-2 Taxol/Carbo  9/11/13 PET/CT IMPRESSION:  1. No evidence of FDG avid malignancy in the neck, chest, abdomen, or pelvis.  2. Inflammatory changes in the presacral and perirectal spaces, likely post radiation change.  9/13/13-10/4/13: Cycle #3-4 Taxol/Carbo  12/16/13: PET/CT IMPRESSION:  1. No evidence of hypermetabolic activity within the neck, chest, abdomen, or pelvis to suggest active or metastatic disease.  2. Persistent inflammatory changes within the low pelvis, most compatible with posttreatment change, without associated hypermetabolic activity to suggest local recurrence.  12/18/13: recovering relatively well from treatment. She still has neuropathy in her feet. It is constant, annoying tingling and numbness in her toes. She has some relief with gabapentin and B6/L-glutamine. She also still has some \"digestive upset\" since finishing radiation therapy, has diarrhea especially in the morning. She also had some chest discomfort last " week, but this has since resolved and she attributes it to anxiety after meeting a patient with similar cancer to hers that has metastasized to lungs. She also continues to have some bleeding and discomfort with use of vaginal dilator. She still takes OCPs and has NOT been skipping sugar pill week. She does not feel she experiences any menopausal symptoms on these off weeks but has also not been monitoring it closely. Amenorrhea still.   3/17/14: CT C/A/P IMPRESSION: No CT scan findings in the chest, abdomen, or pelvis to indicate recurrent or metastatic tumor. Unchanged mild free fluid in the pelvis.  3/19/14 pap NIL  6/10/14: CT C/A/P Impression:   1. No evidence of recurrent or metastatic cervical cancer in the chest, abdomen, or pelvis.   2. New subtle wall thickening of the small bowel loops in the low abdomen, likely sequelae of prior radiation.   3. Stable nonspecific pulmonary nodules measuring up to 3 mm since at least 9/11/2013. Continued followup recommended until 12 month stability is documented. No new pulmonary nodule.   6/11/14: Pap NIL.  9/2/14: Pap NIL, HPV negative. CT cap showed: No evidence of recurrence or metastatic lesion in chest, abdomen, or pelvis. Stable sub-4 mm pulmonary nodules.  12/2/14: Pap LSIL, HPV negative. CT cap showed: Impression:   1. No evidence of recurrent local or metastatic disease in the chest, abdomen, or pelvis.  2. Stable sub-4 mm pulmonary nodule on the left. No new pulmonary nodules.  2/17/15: CT C/A/P: IMPRESSION:   1. Left lower lobe 2 mm nodule unchanged since initial imaging on 9/11/2013.  2. No evidence of recurrent local or metastatic disease in the chest, abdomen, or pelvis.  3/5/15: Pap ASC-H, LSIL also present, HPV 18 postive.    4/4/15: ED visit for 4 days of abdominal pain, increasing in severity with increased frequency of urination and bowel movements over the past 2 days as well. She also complains of abdominal distention. Gyn onc consult in hospital  did not recommend CT at that time unless symptoms worsen. Discharged same day.   XRay abdomen: Nonobstructive bowel gas pattern. No free intraperitoneal air.      4/16/15: Colpo done. No aceto-white changes identified. No biopsies done. Plan to repeat pap in June as well as CT.       6/9/15: CT cap verbal preliminary report by Dr. Eric is no change in pulmonary nodules and no evidence of recurrence/metastatic disease. Pap pending.       Addendum: CT cap IMPRESSION:   1. Indeterminate 12 mm hypodensity within the uterine fundus may represent fluid within the endometrial canal secondary to cervical stenosis. Underlying uterine lesion not excluded. Initially, a dedicated pelvic ultrasound is recommended for further assessment.  2. No evidence of metastatic disease.  3. 2 mm nodules in the left lung are unchanged since at least 9/11/2013, and are statistically benign.       Plan for U/S for further evaluation of uterus.      7/2/15: U/S results pending. Verbal report per Dr. Guajardo shows a solid fibroid like area which is not fluid; recommend f/u with additional imaging.       U/S final IMPRESSION:   1. Relatively well-defined small mixed echogenicity myometrial mass in the uterine fundus. Fibroid could have this appearance, however given patient's history of cervical malignancy and radiation, consider a 3-6 month followup to ensure stability.  2. Ovaries are not identified.  3. Trace free fluid in the pelvis.      9/1/15: CT cap IMPRESSION:    1. No evidence of metastatic disease in the chest, abdomen, or pelvis.  2. Stable uterine fundus hypodensity most consistent with submucosal fibroid as described on pelvic ultrasound 7/2/2015. However, given the patient's history of cervical cancer, short-term followup ultrasound in 3-6 months is recommended.  3. Postsurgical changes of right salpingo-oophorectomy and left  Salpingectomy.      9/1/15: pap NIL, neg HPV.       12/4/15: pelvic ultrasound FINDINGS:  The uterus  measures 5.3 x 3.4 x 2.0 cm. The endometrium is within normal limits and measures 2.0 mm. There is no free fluid in the pelvis. Redemonstration of a heterogeneous appearing circumscribed submucosal mass in the uterine fundus measuring 10 x 8 x 6 mm, previously 8 x 13 x 9 mm. Unchanged calcification in the lower uterine segment. No new masses are identified. The right ovary is surgically absent. The left ovary was not visualized. No adnexal masses.  No focal abnormality of the visualized portions of the bladder.  IMPRESSION:    Mild decrease in the submucosal mass in the uterine fundus from 7/2/2015, which most likely represents a benign fibroid. No other suspicious masses are identified.      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       6//2016: Pap NIL. 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      12/6/16: CT cap IMPRESSION:   1. In this patient with history of cervical cancer there is no evidence of recurrent or metastatic disease in the chest, abdomen and pelvis.   2. Tiny pulmonary nodules are unchanged since 9/11/2013.      5/18/17: Pap NIL.  8/14/17: CT cap IMPRESSION: No evidence of recurrent or metastatic disease in the chest, abdomen and pelvis  2/20/18: CT cap IMPRESSION: No evidence of recurrent or metastatic disease in the chest, abdomen or pelvis.  8/13/18: CT cap IMPRESSION: In this patient with history of neuroendocrine carcinoma of the cervix status post chemotherapy and radiation therapy, there is no evidence of recurrence or metastatic disease in the chest, abdomen, or pelvis.      Per Dr. Salinas, does not need annual imaging.      9/13/18: Pap ASCUS.  9/3/19: Pap  NIL.  10/16/2020: Pap NIL  9/20/21: Pap NIL.  9/19/22: Pap NIL.  7/18/23: Pap NIL  7/30/24: Pap pending.           Today she comes to clinic feeling well  and denies any concerns for her visit. She denies any vaginal bleeding, no changes in her bowel or bladder habits, no nausea/emesis, no lower extremity edema, and no difficulties eating or sleeping. She denies any abdominal discomfort/bloating, no fevers or chills, and no chest pain or shortness of breath. She has a BP cuff at home but has not been using it.     Annual exam with PCP:10/23        Review of Systems  See HPI      Past Medical History:    Past Medical History:   Diagnosis Date    Abnormal Pap smear 04/2013    Cervix cancer (H) 04/2013    neuroendocrine    Hx of previous reproductive problem 05/2013    Due to cancer treatment    Hypertension A couple years ago- on and off    MALIG NEOPLASM EXOCERVIX 05/14/2013         Past Surgical History:    Past Surgical History:   Procedure Laterality Date    BIOPSY      EXAM UNDER ANESTHESIA, INSERT JOESPH SLEEVE, UTERINE PLACEMENT OF TANDEM AND RING FOR RAD, ULTRASOUND  6/24/2013    Procedure: EXAM UNDER ANESTHESIA, INSERT JOESPH SLEEVE, UTERINE PLACEMENT OF TANDEM AND RING FOR RADIATION, ULTRASOUND GUIDED;  Pelvic Exam, Insert JOESPH Sleeve with Ultrasound Guidance and Place Tandem Ring for High Dose Radiation;  Surgeon: Roxy Brar MD;  Location: [facility] OR    LAPAROSCOPIC SALPINGO-OOPHORECTOMY  5/13/2013    Procedure: LAPAROSCOPIC SALPINGO-OOPHORECTOMY;  Laparoscopy, Left  salpingectomy,Ovarian Transposition, Right Salpingo Oophorectomy , Anesthesia General with block;  Surgeon: Deanna Salinas MD;  Location: [facility] OR    OPEN REDUCTION INTERNAL FIXATION HAND Right 1/7/2020    Procedure: OPEN REDUCTION INTERNAL FIXATION, FRACTURE, HAND;  Surgeon: Luis Kellogg MD;  Location:  OR    wisdom teeth      [facility] HAND/FINGER SURGERY UNLISTED  January 2020       Current Medications:     Current Outpatient  Medications   Medication Sig Dispense Refill    clobetasol (TEMOVATE) 0.05 % external solution Apply topically twice daily to scalp*      cyanocobalamin (VITAMIN B-12) 1000 MCG tablet Take 1 tablet (1,000 mcg) by mouth daily 90 tablet 3    norgestrel-ethinyl estradiol (ELINEST) 0.3-30 MG-MCG tablet Take 1 tablet by mouth daily 84 tablet 4         Allergies:      No Known Allergies     Social History:     Social History     Tobacco Use    Smoking status: Never     Passive exposure: Never    Smokeless tobacco: Never    Tobacco comments:     Never been a smoker   Substance Use Topics    Alcohol use: Yes     Comment: Occasional       History   Drug Use No         Family History:       Family History   Problem Relation Age of Onset    Cancer Maternal Grandfather         leukemia    Other Cancer Maternal Grandfather         Grandpa had leukemia in his 80's    Unknown/Adopted Other         Adopted 4/19/2016    Substance Abuse Father         My Dad has now been sober for over 40 years    Unknown/Adopted Other         Adopted 8/1/2019    Breast Cancer No family hx of     Cancer - colorectal No family hx of          Physical Exam:     BP (!) 161/89 (BP Location: Right arm, Patient Position: Sitting, Cuff Size: Adult Regular)   Pulse 79   Temp 98.3  F (36.8  C) (Oral)   Resp 16   Wt 68 kg (149 lb 14.4 oz)   SpO2 100%   BMI 22.46 kg/m    Body mass index is 22.46 kg/m .    General Appearance: healthy and alert, no distress     HEENT: no thyromegaly, no palpable nodules or masses        Cardiovascular: regular rate and rhythm, no gallops, rubs or murmurs     Respiratory: lungs clear, no rales, rhonchi or wheezes, normal diaphragmatic excursion    Musculoskeletal: extremities non tender and without edema    Skin: no lesions or rashes     Neurological: normal gait, no gross defects     Psychiatric: appropriate mood and affect                               Hematological: normal cervical, supraclavicular and inguinal lymph  nodes     Gastrointestinal:       abdomen soft, non-tender, non-distended, no organomegaly or masses    Genitourinary: External genitalia and urethral meatus appears normal.  Vagina is smooth without nodularity or masses.  Cervix flush with vagina. Bimanual exam reveal no masses, nodularity or fullness.  Recto-vaginal exam confirms these findings. Pap collected.      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   She is here today for an annual exam and pap.     20 minutes spent on the date of the encounter doing chart review, history and exam, documentation and further activities as noted above      Plan:     1.)       Continue annual pelvic exam and pap (no HPV); today's is pending. Reviewed recommendations from [facility] against performing colposcopy in patients treated for cervical cancer with Pap tests of LSIL or less. Colposcopy for LSIL in this group does not detect recurrence unless there is a visible lesion.  Reviewed signs and symptoms for when she should contact the clinic or seek additional care. Patient to contact the clinic with any questions or concerns in the interim.  Answered all of her questions to the best of my ability.    -elevated BP: encouraged to check her BP at home and follow up with her PCP if her readings remain >130/80.  -reviewed recommendation to have a DEXA by age 40 to get a baseline given radiation.   -continue on estrogen supplementation until natural age of menopause (50).     The longitudinal plan of care for the diagnosis(es)/condition(s) as documented were addressed during this visit. Due to the added complexity in care, I will continue to support Erika in the subsequent management and with ongoing continuity of care.     2.) Genetic risk factors were assessed and the  patient does not meet the qualifications for a referral.      3.) Labs and/or tests ordered include:  Pap.     4.) Health maintenance issues addressed today include annual health maintenance and non-gynecologic issues with PCP.    AMOL Whittaker, WHNP-BC, ANP-BC, AOCNP  Women's Health Nurse Practitioner  Adult Nurse Practitioner  Division of Gynecologic Oncology        CC  Patient Care Team:  Carolyn Dsouza DO as PCP - General (Family Medicine)  Shima Babcock MD as MD (Neurology)  Shima Babcock MD as Referring Physician (Neurology)  Pancho Villareal MD as MD (Neurology)  Ajit Dexter MD as MD (Family Practice)  Cheyanne Burleson APRN CNP as Assigned Cancer Care Provider  Carolyn Dsouza DO as Assigned PCP  SELF, REFERRED

## 2024-07-30 ENCOUNTER — ONCOLOGY VISIT (OUTPATIENT)
Dept: ONCOLOGY | Facility: CLINIC | Age: 39
End: 2024-07-30
Attending: NURSE PRACTITIONER
Payer: COMMERCIAL

## 2024-07-30 VITALS
OXYGEN SATURATION: 100 % | HEART RATE: 79 BPM | RESPIRATION RATE: 16 BRPM | SYSTOLIC BLOOD PRESSURE: 161 MMHG | TEMPERATURE: 98.3 F | WEIGHT: 149.9 LBS | BODY MASS INDEX: 22.46 KG/M2 | DIASTOLIC BLOOD PRESSURE: 89 MMHG

## 2024-07-30 DIAGNOSIS — Z85.41 HX OF CERVICAL CANCER: Primary | ICD-10-CM

## 2024-07-30 DIAGNOSIS — R03.0 ELEVATED BP WITHOUT DIAGNOSIS OF HYPERTENSION: ICD-10-CM

## 2024-07-30 PROCEDURE — 99212 OFFICE O/P EST SF 10 MIN: CPT | Performed by: NURSE PRACTITIONER

## 2024-07-30 PROCEDURE — G2211 COMPLEX E/M VISIT ADD ON: HCPCS | Performed by: NURSE PRACTITIONER

## 2024-07-30 PROCEDURE — G0145 SCR C/V CYTO,THINLAYER,RESCR: HCPCS | Performed by: NURSE PRACTITIONER

## 2024-07-30 PROCEDURE — 99213 OFFICE O/P EST LOW 20 MIN: CPT | Performed by: NURSE PRACTITIONER

## 2024-07-30 ASSESSMENT — PAIN SCALES - GENERAL: PAINLEVEL: NO PAIN (0)

## 2024-07-30 NOTE — LETTER
2024      Erika Ziegler   [facility] [address]      Dear Colleague,    Thank you for referring your patient, Erika Ziegler, to the [facility]. Please see a copy of my visit note below.                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   She is here today for an annual exam and pap. .     Oncology history:  Erika had some post coital bleeding and was seen by Dr. Silva for her annual visit in . On pelvic examination she was noted to have a friable cervical mass for which a pap smear was obtained. She then underwent a colposcopy with biopsies taken with above diagnosis made. She would like to have children in the future and the current plan is to preserve her ovaries and undergo oocyte and/embryo preservation with surrogate carrier.   Pap/colpo history:   5/10/4: NIL   05: LSIL   3/2006 colpo with mild dysplasia   06: LSIL pap   07: ASCUS +HPV   07: LSIL   2008: colpo with mild dysplasia   08 & 2009: LSIL   2009: ANDRZEJ I-II   3/2010: colpo ANDRZEJ I   11/5/10, 11, 12: NIL   13: ASC-H, JERRI   2013: colpo with poorly differentiated carcinoma with neuroendocrine differentiation and partial tumor necrosis   13: PET CT with 6.5 x 5.2 cm cervical mass; tiny focus of increased metabolism in the right midpelvis laterally. The ureter at this level is difficult to follow. This may represent some activity in the distal ureter through it is difficult to completely exclude activity in a small non enlarged pelvic lymph node. Chest lear, no other evidence of mets.   13: Met with  reproductive endocrinology to discuss fertility options. Per their recommendation, given the cervical cancer diagnosis and the size of the lesion, the patient is not a suitable candidate for transvaginal oocyte retrieval. Transabdominal ultrasound was performed in addition to transvaginal ultrasound, to evaluate for possible transabdominal oocyte retrieval, with ovaries positioned low in the pelvis precluding safe transabdominal oocyte retrieval. Discussed option of ovarian tissue cryopreservation and ovarian translocation prior to radiation therapy.   5/1/13: MR PELVIS IMPRESSION:  1. 5.1 x 3.1 x 5.9 cm enhancing cervical mass extending of the posterior aspect of the cervix not involving the vagina or uterus no evidence of parametrial spread.  2. Two small nonspecific pelvic lymph nodes, 1.0 x 0.6 cm left pelvic lymph node series 6 image 6, 0.6 x 1.0 cm right pelvic lymph node on image 6.  5/13/13: laparoscopy, left ovarian transposition, right salpingo oophorectomy (reproductive medicine taking right ovary after surgery)   5/20/13-6/17/13: Cycle #1-3 Etoposide/Cisplatin; began EBRT   6/24/13: Insertion of High Dose Rate Tandem and Ring for Iridium-192 implant. Pt tolerated well.   7/9/13: Completed brachytherapy  8/2/13-8/21/13: Cycle #1-2 Taxol/Carbo  9/11/13 PET/CT IMPRESSION:  1. No evidence of FDG avid malignancy in the neck, chest, abdomen, or pelvis.  2. Inflammatory changes in the presacral and perirectal spaces, likely post radiation change.  9/13/13-10/4/13: Cycle #3-4 Taxol/Carbo  12/16/13: PET/CT IMPRESSION:  1. No evidence of hypermetabolic activity within the neck, chest, abdomen, or pelvis to suggest active or metastatic disease.  2. Persistent inflammatory changes within the low pelvis, most compatible with posttreatment change, without associated hypermetabolic activity to suggest local recurrence.  12/18/13: recovering relatively well from treatment. She still has neuropathy in her feet. It is  "constant, annoying tingling and numbness in her toes. She has some relief with gabapentin and B6/L-glutamine. She also still has some \"digestive upset\" since finishing radiation therapy, has diarrhea especially in the morning. She also had some chest discomfort last week, but this has since resolved and she attributes it to anxiety after meeting a patient with similar cancer to hers that has metastasized to lungs. She also continues to have some bleeding and discomfort with use of vaginal dilator. She still takes OCPs and has NOT been skipping sugar pill week. She does not feel she experiences any menopausal symptoms on these off weeks but has also not been monitoring it closely. Amenorrhea still.   3/17/14: CT C/A/P IMPRESSION: No CT scan findings in the chest, abdomen, or pelvis to indicate recurrent or metastatic tumor. Unchanged mild free fluid in the pelvis.  3/19/14 pap NIL  6/10/14: CT C/A/P Impression:   1. No evidence of recurrent or metastatic cervical cancer in the chest, abdomen, or pelvis.   2. New subtle wall thickening of the small bowel loops in the low abdomen, likely sequelae of prior radiation.   3. Stable nonspecific pulmonary nodules measuring up to 3 mm since at least 9/11/2013. Continued followup recommended until 12 month stability is documented. No new pulmonary nodule.   6/11/14: Pap NIL.  9/2/14: Pap NIL, HPV negative. CT cap showed: No evidence of recurrence or metastatic lesion in chest, abdomen, or pelvis. Stable sub-4 mm pulmonary nodules.  12/2/14: Pap LSIL, HPV negative. CT cap showed: Impression:   1. No evidence of recurrent local or metastatic disease in the chest, abdomen, or pelvis.  2. Stable sub-4 mm pulmonary nodule on the left. No new pulmonary nodules.  2/17/15: CT C/A/P: IMPRESSION:   1. Left lower lobe 2 mm nodule unchanged since initial imaging on 9/11/2013.  2. No evidence of recurrent local or metastatic disease in the chest, abdomen, or pelvis.  3/5/15: Pap ASC-H, " LSIL also present, HPV 18 postive.    4/4/15: ED visit for 4 days of abdominal pain, increasing in severity with increased frequency of urination and bowel movements over the past 2 days as well. She also complains of abdominal distention. Gyn onc consult in hospital did not recommend CT at that time unless symptoms worsen. Discharged same day.   XRay abdomen: Nonobstructive bowel gas pattern. No free intraperitoneal air.      4/16/15: Colpo done. No aceto-white changes identified. No biopsies done. Plan to repeat pap in June as well as CT.       6/9/15: CT cap verbal preliminary report by Dr. Eric is no change in pulmonary nodules and no evidence of recurrence/metastatic disease. Pap pending.       Addendum: CT cap IMPRESSION:   1. Indeterminate 12 mm hypodensity within the uterine fundus may represent fluid within the endometrial canal secondary to cervical stenosis. Underlying uterine lesion not excluded. Initially, a dedicated pelvic ultrasound is recommended for further assessment.  2. No evidence of metastatic disease.  3. 2 mm nodules in the left lung are unchanged since at least 9/11/2013, and are statistically benign.       Plan for U/S for further evaluation of uterus.      7/2/15: U/S results pending. Verbal report per Dr. Guajardo shows a solid fibroid like area which is not fluid; recommend f/u with additional imaging.       U/S final IMPRESSION:   1. Relatively well-defined small mixed echogenicity myometrial mass in the uterine fundus. Fibroid could have this appearance, however given patient's history of cervical malignancy and radiation, consider a 3-6 month followup to ensure stability.  2. Ovaries are not identified.  3. Trace free fluid in the pelvis.      9/1/15: CT cap IMPRESSION:    1. No evidence of metastatic disease in the chest, abdomen, or pelvis.  2. Stable uterine fundus hypodensity most consistent with submucosal fibroid as described on pelvic ultrasound 7/2/2015. However, given the  patient's history of cervical cancer, short-term followup ultrasound in 3-6 months is recommended.  3. Postsurgical changes of right salpingo-oophorectomy and left  Salpingectomy.      9/1/15: pap NIL, neg HPV.       12/4/15: pelvic ultrasound FINDINGS:  The uterus measures 5.3 x 3.4 x 2.0 cm. The endometrium is within normal limits and measures 2.0 mm. There is no free fluid in the pelvis. Redemonstration of a heterogeneous appearing circumscribed submucosal mass in the uterine fundus measuring 10 x 8 x 6 mm, previously 8 x 13 x 9 mm. Unchanged calcification in the lower uterine segment. No new masses are identified. The right ovary is surgically absent. The left ovary was not visualized. No adnexal masses.  No focal abnormality of the visualized portions of the bladder.  IMPRESSION:    Mild decrease in the submucosal mass in the uterine fundus from 7/2/2015, which most likely represents a benign fibroid. No other suspicious masses are identified.      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       6//2016: Pap NIL. 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      12/6/16: CT cap IMPRESSION:   1. In this patient with history of cervical cancer there is no evidence of recurrent or metastatic disease in the chest, abdomen and pelvis.   2. Tiny pulmonary nodules are unchanged since 9/11/2013.      5/18/17: Pap NIL.  8/14/17: CT cap IMPRESSION: No evidence of recurrent or metastatic disease in the chest, abdomen and pelvis  2/20/18: CT cap IMPRESSION: No evidence of recurrent or metastatic disease in the chest, abdomen or pelvis.  8/13/18: CT cap IMPRESSION: In this patient with history of  neuroendocrine carcinoma of the cervix status post chemotherapy and radiation therapy, there is no evidence of recurrence or metastatic disease in the chest, abdomen, or pelvis.      Per Dr. Salinas, does not need annual imaging.      9/13/18: Pap ASCUS.  9/3/19: Pap NIL.  10/16/2020: Pap NIL  9/20/21: Pap NIL.  9/19/22: Pap NIL.  7/18/23: Pap NIL  7/30/24: Pap pending.           Today she comes to clinic feeling well  and denies any concerns for her visit. She denies any vaginal bleeding, no changes in her bowel or bladder habits, no nausea/emesis, no lower extremity edema, and no difficulties eating or sleeping. She denies any abdominal discomfort/bloating, no fevers or chills, and no chest pain or shortness of breath. She has a BP cuff at home but has not been using it.     Annual exam with PCP:10/23        Review of Systems  See HPI      Past Medical History:    Past Medical History:   Diagnosis Date     Abnormal Pap smear 04/2013     Cervix cancer (H) 04/2013    neuroendocrine     Hx of previous reproductive problem 05/2013    Due to cancer treatment     Hypertension A couple years ago- on and off     MALIG NEOPLASM EXOCERVIX 05/14/2013         Past Surgical History:    Past Surgical History:   Procedure Laterality Date     BIOPSY       EXAM UNDER ANESTHESIA, INSERT JOESPH SLEEVE, UTERINE PLACEMENT OF TANDEM AND RING FOR RAD, ULTRASOUND  6/24/2013    Procedure: EXAM UNDER ANESTHESIA, INSERT JOESPH SLEEVE, UTERINE PLACEMENT OF TANDEM AND RING FOR RADIATION, ULTRASOUND GUIDED;  Pelvic Exam, Insert JOESPH Sleeve with Ultrasound Guidance and Place Tandem Ring for High Dose Radiation;  Surgeon: Roxy Brar MD;  Location: [facility] OR     LAPAROSCOPIC SALPINGO-OOPHORECTOMY  5/13/2013    Procedure: LAPAROSCOPIC SALPINGO-OOPHORECTOMY;  Laparoscopy, Left  salpingectomy,Ovarian Transposition, Right Salpingo Oophorectomy , Anesthesia General with block;  Surgeon: Deanna Salinas MD;  Location: [facility] OR     OPEN REDUCTION  INTERNAL FIXATION HAND Right 1/7/2020    Procedure: OPEN REDUCTION INTERNAL FIXATION, FRACTURE, HAND;  Surgeon: Luis Kellogg MD;  Location: [facility] OR     wisdom teeth       [facility] HAND/FINGER SURGERY UNLISTED  January 2020       Current Medications:     Current Outpatient Medications   Medication Sig Dispense Refill     clobetasol (TEMOVATE) 0.05 % external solution Apply topically twice daily to scalp*       cyanocobalamin (VITAMIN B-12) 1000 MCG tablet Take 1 tablet (1,000 mcg) by mouth daily 90 tablet 3     norgestrel-ethinyl estradiol (ELINEST) 0.3-30 MG-MCG tablet Take 1 tablet by mouth daily 84 tablet 4         Allergies:      No Known Allergies     Social History:     Social History     Tobacco Use     Smoking status: Never     Passive exposure: Never     Smokeless tobacco: Never     Tobacco comments:     Never been a smoker   Substance Use Topics     Alcohol use: Yes     Comment: Occasional       History   Drug Use No         Family History:       Family History   Problem Relation Age of Onset     Cancer Maternal Grandfather         leukemia     Other Cancer Maternal Grandfather         Grandpa had leukemia in his 80's     Unknown/Adopted Other         Adopted 4/19/2016     Substance Abuse Father         My Dad has now been sober for over 40 years     Unknown/Adopted Other         Adopted 8/1/2019     Breast Cancer No family hx of      Cancer - colorectal No family hx of          Physical Exam:     BP (!) 161/89 (BP Location: Right arm, Patient Position: Sitting, Cuff Size: Adult Regular)   Pulse 79   Temp 98.3  F (36.8  C) (Oral)   Resp 16   Wt 68 kg (149 lb 14.4 oz)   SpO2 100%   BMI 22.46 kg/m    Body mass index is 22.46 kg/m .    General Appearance: healthy and alert, no distress     HEENT: no thyromegaly, no palpable nodules or masses        Cardiovascular: regular rate and rhythm, no gallops, rubs or murmurs     Respiratory: lungs clear, no rales, rhonchi or wheezes, normal diaphragmatic  excursion    Musculoskeletal: extremities non tender and without edema    Skin: no lesions or rashes     Neurological: normal gait, no gross defects     Psychiatric: appropriate mood and affect                               Hematological: normal cervical, supraclavicular and inguinal lymph nodes     Gastrointestinal:       abdomen soft, non-tender, non-distended, no organomegaly or masses    Genitourinary: External genitalia and urethral meatus appears normal.  Vagina is smooth without nodularity or masses.  Cervix flush with vagina. Bimanual exam reveal no masses, nodularity or fullness.  Recto-vaginal exam confirms these findings. Pap collected.      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   She is here today for an annual exam and pap.     20 minutes spent on the date of the encounter doing chart review, history and exam, documentation and further activities as noted above      Plan:     1.)       Continue annual pelvic exam and pap (no HPV); today's is pending. Reviewed recommendations from [facility] against performing colposcopy in patients treated for cervical cancer with Pap tests of LSIL or less. Colposcopy for LSIL in this group does not detect recurrence unless there is a visible lesion.  Reviewed signs and symptoms for when she should contact the clinic or seek additional care. Patient to contact the clinic with any questions or concerns in the interim.  Answered all of her questions to the best of my ability.    -elevated BP: encouraged to check her BP at home and follow up with her PCP if her readings remain >130/80.  -reviewed recommendation to have a DEXA by age 40 to get a baseline given radiation.   -continue on estrogen supplementation until natural age of menopause (50).     The longitudinal  plan of care for the diagnosis(es)/condition(s) as documented were addressed during this visit. Due to the added complexity in care, I will continue to support Erika in the subsequent management and with ongoing continuity of care.     2.) Genetic risk factors were assessed and the patient does not meet the qualifications for a referral.      3.) Labs and/or tests ordered include:  Pap.     4.) Health maintenance issues addressed today include annual health maintenance and non-gynecologic issues with PCP.    AMOL Whittaker, WHNP-BC, ANP-BC, AOCNP  Women's Health Nurse Practitioner  Adult Nurse Practitioner  Division of Gynecologic Oncology        CC  Patient Care Team:  Carolyn Dsouza DO as PCP - General (Family Medicine)  Shima Babcock MD as MD (Neurology)  Shima Babcock MD as Referring Physician (Neurology)  Pancho Villareal MD as MD (Neurology)  Ajit Dexter MD as MD (Family Practice)  Cheyanne Burleson APRN CNP as Assigned Cancer Care Provider  Carolyn Dsouza DO as Assigned PCP  SELF, REFERRED      Again, thank you for allowing me to participate in the care of your patient.        Sincerely,        AMOL Tomlinson CNP

## 2024-07-30 NOTE — NURSING NOTE
"Oncology Rooming Note    July 30, 2024 10:04 AM   Erika Ziegler is a 38 year old female who presents for:    Chief Complaint   Patient presents with    Oncology Clinic Visit     Hx of cervical cancer     Initial Vitals: BP (!) 161/89 (BP Location: Right arm, Patient Position: Sitting, Cuff Size: Adult Regular)   Pulse 79   Temp 98.3  F (36.8  C) (Oral)   Resp 16   Wt 68 kg (149 lb 14.4 oz)   SpO2 100%   BMI 22.46 kg/m   Estimated body mass index is 22.46 kg/m  as calculated from the following:    Height as of 10/17/23: 1.74 m (5' 8.5\").    Weight as of this encounter: 68 kg (149 lb 14.4 oz). Body surface area is 1.81 meters squared.  No Pain (0) Comment: Data Unavailable   No LMP recorded. (Menstrual status: Tubal ).  Allergies reviewed: Yes  Medications reviewed: Yes    Medications: Medication refills not needed today.  Pharmacy name entered into [employer]: [id number] - [address]    Frailty Screening:   Is the patient here for a new oncology consult visit in cancer care? 2. No      Clinical concerns: none.       Alfonso George"

## 2024-10-07 ENCOUNTER — TRANSFERRED RECORDS (OUTPATIENT)
Dept: HEALTH INFORMATION MANAGEMENT | Facility: CLINIC | Age: 39
End: 2024-10-07
Payer: COMMERCIAL

## 2024-10-11 ENCOUNTER — PATIENT OUTREACH (OUTPATIENT)
Dept: CARE COORDINATION | Facility: CLINIC | Age: 39
End: 2024-10-11
Payer: COMMERCIAL

## 2024-10-11 NOTE — NURSING NOTE
"Oncology Rooming Note    September 20, 2021 9:39 AM   Erika Ziegler is a 35 year old female who presents for:    Chief Complaint   Patient presents with     Oncology Clinic Visit     CERVICAL CANCER      Initial Vitals: BP (!) 158/99   Pulse 88   Temp 98.1  F (36.7  C) (Oral)   Resp 16   Wt 69.7 kg (153 lb 11.2 oz)   SpO2 99%   BMI 22.70 kg/m   Estimated body mass index is 22.7 kg/m  as calculated from the following:    Height as of 1/7/20: 1.753 m (5' 9\").    Weight as of this encounter: 69.7 kg (153 lb 11.2 oz). Body surface area is 1.84 meters squared.  No Pain (0) Comment: Data Unavailable   No LMP recorded. (Menstrual status: Amenorrhea).  Allergies reviewed: Yes  Medications reviewed: Yes    Medications: Medication refills not needed today.  Pharmacy name entered into [facility]: [id number] - [place], [place] - [address] [place] [address]    Clinical concerns: No new concerns today      Karo Sam CMA September 20, 2021  9:39 AM             "
yes

## 2024-10-21 SDOH — HEALTH STABILITY: PHYSICAL HEALTH: ON AVERAGE, HOW MANY MINUTES DO YOU ENGAGE IN EXERCISE AT THIS LEVEL?: 30 MIN

## 2024-10-21 SDOH — HEALTH STABILITY: PHYSICAL HEALTH: ON AVERAGE, HOW MANY DAYS PER WEEK DO YOU ENGAGE IN MODERATE TO STRENUOUS EXERCISE (LIKE A BRISK WALK)?: 4 DAYS

## 2024-10-21 ASSESSMENT — SOCIAL DETERMINANTS OF HEALTH (SDOH): HOW OFTEN DO YOU GET TOGETHER WITH FRIENDS OR RELATIVES?: ONCE A WEEK

## 2024-10-24 ENCOUNTER — OFFICE VISIT (OUTPATIENT)
Dept: FAMILY MEDICINE | Facility: CLINIC | Age: 39
End: 2024-10-24
Attending: FAMILY MEDICINE
Payer: COMMERCIAL

## 2024-10-24 VITALS
DIASTOLIC BLOOD PRESSURE: 84 MMHG | RESPIRATION RATE: 16 BRPM | WEIGHT: 151 LBS | SYSTOLIC BLOOD PRESSURE: 136 MMHG | OXYGEN SATURATION: 100 % | HEART RATE: 74 BPM | HEIGHT: 69 IN | TEMPERATURE: 98.4 F | BODY MASS INDEX: 22.36 KG/M2

## 2024-10-24 DIAGNOSIS — Z00.00 ANNUAL PHYSICAL EXAM: Primary | ICD-10-CM

## 2024-10-24 DIAGNOSIS — E28.39 PREMATURE OVARIAN FAILURE: ICD-10-CM

## 2024-10-24 DIAGNOSIS — R79.89 ELEVATED FERRITIN: ICD-10-CM

## 2024-10-24 DIAGNOSIS — E53.8 VITAMIN B12 DEFICIENCY (NON ANEMIC): ICD-10-CM

## 2024-10-24 DIAGNOSIS — Z13.1 ENCOUNTER FOR SCREENING EXAMINATION FOR IMPAIRED GLUCOSE REGULATION AND DIABETES MELLITUS: ICD-10-CM

## 2024-10-24 DIAGNOSIS — Z23 IMMUNIZATION DUE: ICD-10-CM

## 2024-10-24 DIAGNOSIS — Z13.220 LIPID SCREENING: ICD-10-CM

## 2024-10-24 LAB
ERYTHROCYTE [DISTWIDTH] IN BLOOD BY AUTOMATED COUNT: 12 % (ref 10–15)
HCT VFR BLD AUTO: 40.8 % (ref 35–47)
HGB BLD-MCNC: 13.5 G/DL (ref 11.7–15.7)
MCH RBC QN AUTO: 30.3 PG (ref 26.5–33)
MCHC RBC AUTO-ENTMCNC: 33.1 G/DL (ref 31.5–36.5)
MCV RBC AUTO: 92 FL (ref 78–100)
PLATELET # BLD AUTO: 262 10E3/UL (ref 150–450)
RBC # BLD AUTO: 4.46 10E6/UL (ref 3.8–5.2)
WBC # BLD AUTO: 5.5 10E3/UL (ref 4–11)

## 2024-10-24 PROCEDURE — 85027 COMPLETE CBC AUTOMATED: CPT | Performed by: FAMILY MEDICINE

## 2024-10-24 PROCEDURE — 82728 ASSAY OF FERRITIN: CPT | Performed by: FAMILY MEDICINE

## 2024-10-24 PROCEDURE — 99395 PREV VISIT EST AGE 18-39: CPT | Mod: 25 | Performed by: FAMILY MEDICINE

## 2024-10-24 PROCEDURE — 82607 VITAMIN B-12: CPT | Performed by: FAMILY MEDICINE

## 2024-10-24 PROCEDURE — 83550 IRON BINDING TEST: CPT | Performed by: FAMILY MEDICINE

## 2024-10-24 PROCEDURE — 90471 IMMUNIZATION ADMIN: CPT | Performed by: FAMILY MEDICINE

## 2024-10-24 PROCEDURE — 90656 IIV3 VACC NO PRSV 0.5 ML IM: CPT | Performed by: FAMILY MEDICINE

## 2024-10-24 PROCEDURE — 36415 COLL VENOUS BLD VENIPUNCTURE: CPT | Performed by: FAMILY MEDICINE

## 2024-10-24 PROCEDURE — 83540 ASSAY OF IRON: CPT | Performed by: FAMILY MEDICINE

## 2024-10-24 PROCEDURE — 99213 OFFICE O/P EST LOW 20 MIN: CPT | Mod: 25 | Performed by: FAMILY MEDICINE

## 2024-10-24 PROCEDURE — 80053 COMPREHEN METABOLIC PANEL: CPT | Performed by: FAMILY MEDICINE

## 2024-10-24 PROCEDURE — 80061 LIPID PANEL: CPT | Performed by: FAMILY MEDICINE

## 2024-10-24 RX ORDER — LANOLIN ALCOHOL/MO/W.PET/CERES
1000 CREAM (GRAM) TOPICAL DAILY
Qty: 90 TABLET | Refills: 3 | Status: CANCELLED | OUTPATIENT
Start: 2024-10-24

## 2024-10-24 RX ORDER — NORGESTREL AND ETHINYL ESTRADIOL 0.3-0.03MG
1 KIT ORAL DAILY
Qty: 84 TABLET | Refills: 4 | Status: CANCELLED | OUTPATIENT
Start: 2024-10-24

## 2024-10-24 NOTE — PATIENT INSTRUCTIONS
Patient Education   Preventive Care Advice   This is general advice given by our system to help you stay healthy. However, your care team may have specific advice just for you. Please talk to your care team about your preventive care needs.  Nutrition  Eat 5 or more servings of fruits and vegetables each day.  Try wheat bread, brown rice and whole grain pasta (instead of white bread, rice, and pasta).  Get enough calcium and vitamin D. Check the label on foods and aim for 100% of the RDA (recommended daily allowance).  Lifestyle  Exercise at least 150 minutes each week  (30 minutes a day, 5 days a week).  Do muscle strengthening activities 2 days a week. These help control your weight and prevent disease.  No smoking.  Wear sunscreen to prevent skin cancer.  Have a dental exam and cleaning every 6 months.  Yearly exams  See your health care team every year to talk about:  Any changes in your health.  Any medicines your care team has prescribed.  Preventive care, family planning, and ways to prevent chronic diseases.  Shots (vaccines)   HPV shots (up to age 26), if you've never had them before.  Hepatitis B shots (up to age 59), if you've never had them before.  COVID-19 shot: Get this shot when it's due.  Flu shot: Get a flu shot every year.  Tetanus shot: Get a tetanus shot every 10 years.  Pneumococcal, hepatitis A, and RSV shots: Ask your care team if you need these based on your risk.  Shingles shot (for age 50 and up)  General health tests  Diabetes screening:  Starting at age 35, Get screened for diabetes at least every 3 years.  If you are younger than age 35, ask your care team if you should be screened for diabetes.  Cholesterol test: At age 39, start having a cholesterol test every 5 years, or more often if advised.  Bone density scan (DEXA): At age 50, ask your care team if you should have this scan for osteoporosis (brittle bones).  Hepatitis C: Get tested at least once in your life.  STIs (sexually  transmitted infections)  Before age 24: Ask your care team if you should be screened for STIs.  After age 24: Get screened for STIs if you're at risk. You are at risk for STIs (including HIV) if:  You are sexually active with more than one person.  You don't use condoms every time.  You or a partner was diagnosed with a sexually transmitted infection.  If you are at risk for HIV, ask about PrEP medicine to prevent HIV.  Get tested for HIV at least once in your life, whether you are at risk for HIV or not.  Cancer screening tests  Cervical cancer screening: If you have a cervix, begin getting regular cervical cancer screening tests starting at age 21.  Breast cancer scan (mammogram): If you've ever had breasts, begin having regular mammograms starting at age 40. This is a scan to check for breast cancer.  Colon cancer screening: It is important to start screening for colon cancer at age 45.  Have a colonoscopy test every 10 years (or more often if you're at risk) Or, ask your provider about stool tests like a FIT test every year or Cologuard test every 3 years.  To learn more about your testing options, visit:   .  For help making a decision, visit:   https://bit.ly/ud38782.  Prostate cancer screening test: If you have a prostate, ask your care team if a prostate cancer screening test (PSA) at age 55 is right for you.  Lung cancer screening: If you are a current or former smoker ages 50 to 80, ask your care team if ongoing lung cancer screenings are right for you.  For informational purposes only. Not to replace the advice of your health care provider. Copyright   2023 [employer]. All rights reserved. Clinically reviewed by the [facility] Transitions Program. [employer] 156644 - REV 01/24.

## 2024-10-24 NOTE — PROGRESS NOTES
Preventive Care Visit  [facility]  Carolyn Dsouza DO, Family Medicine  Oct 24, 2024      Assessment & Plan     1. Annual physical exam (Primary)  Reviewed health history and health maintenance recommendations.  Discussed hepatitis B vaccine, she is low risk, hepatitis A antigen -2013.  We have not checked antibodies.  Defers vaccines for now, will consider if plans to travel in the future.    2. Premature ovarian failure  Continues on OCPs for hormone replacement therapy due to premature ovarian failure.  She is not having periods.  Doing well, states she does not need a refill.    3. Vitamin B12 deficiency (non anemic)  - CBC with platelets; Future  - Vitamin B12; Future    Will trend labs today, she has been off the B12 recently.  Unclear underlying etiology of B12 deficiency, did normalize with supplementation.  If normal, okay to hold B12 supplementation, but would recommend we trend this periodically.  If low, we will resume B12 supplementation.    4. Elevated ferritin  - CBC with platelets; Future  - Ferritin; Future  - Comprehensive metabolic panel (BMP + Alb, Alk Phos, ALT, AST, Total. Bili, TP); Future  - Iron and iron binding capacity; Future    Trend from last year, if persistently elevated will discuss additional workup.  Will also check iron studies for iron overload and recheck liver enzymes.    5. Encounter for screening examination for impaired glucose regulation and diabetes mellitus  - Comprehensive metabolic panel (BMP + Alb, Alk Phos, ALT, AST, Total. Bili, TP); Future    6. Lipid screening  - Lipid panel reflex to direct LDL Non-fasting; Future    7. Immunization due  - INFLUENZA VACCINE,SPLIT VIRUS,TRIVALENT,PF(FLUZONE)        Patient has been advised of split billing requirements and indicates understanding: Yes      Counseling  Appropriate preventive services were addressed with this patient via screening, questionnaire, or discussion as appropriate for fall  prevention, nutrition, physical activity, Tobacco-use cessation, social engagement, weight loss and cognition.  Checklist reviewing preventive services available has been given to the patient.  Reviewed patient's diet, addressing concerns and/or questions.       Justice James is a 38 year old, presenting for the following:  Physical        10/24/2024    12:59 PM   Additional Questions   Roomed by Jonnie GLEZ MA          HPI      Health Care Directive  Patient does not have a Health Care Directive: Discussed advance care planning with patient; however, patient declined at this time.          10/21/2024   General Health   How would you rate your overall physical health? Good   Feel stress (tense, anxious, or unable to sleep) Not at all            10/21/2024   Nutrition   Three or more servings of calcium each day? Yes   Diet: Regular (no restrictions)   How many servings of fruit and vegetables per day? 4 or more   How many sweetened beverages each day? 0-1            10/21/2024   Exercise   Days per week of moderate/strenous exercise 4 days   Average minutes spent exercising at this level 30 min            10/21/2024   Social Factors   Frequency of gathering with friends or relatives Once a week   Worry food won't last until get money to buy more No   Food not last or not have enough money for food? No   Do you have housing? (Housing is defined as stable permanent housing and does not include staying ouside in a car, in a tent, in an abandoned building, in an overnight shelter, or couch-surfing.) No   Are you worried about losing your housing? No   Lack of transportation? No   Unable to get utilities (heat,electricity)? No   Want help with housing or utility concern? No      (!) HOUSING CONCERN PRESENT      10/21/2024   Dental   Dentist two times every year? Yes            10/21/2024   TB Screening   Were you born outside of the [place]? No            Today's PHQ-2 Score:       10/24/2024     9:53 AM   PHQ-2 ( 1999  "[employer])   Q1: Little interest or pleasure in doing things 0    Q2: Feeling down, depressed or hopeless 0    PHQ-2 Score 0    Q1: Little interest or pleasure in doing things Not at all   Q2: Feeling down, depressed or hopeless Not at all   PHQ-2 Score 0       Patient-reported           10/21/2024   Substance Use   Alcohol more than 3/day or more than 7/wk No   Do you use any other substances recreationally? No        Social History     Tobacco Use    Smoking status: Never     Passive exposure: Never    Smokeless tobacco: Never    Tobacco comments:     Never been a smoker   Vaping Use    Vaping status: Never Used   Substance Use Topics    Alcohol use: Yes     Comment: Occasional    Drug use: No          Mammogram Screening - Patient under 40 years of age: Routine Mammogram Screening not recommended.         10/21/2024   STI Screening   New sexual partner(s) since last STI/HIV test? No        History of abnormal Pap smear: History cervical cancer, follows with oncology for annual Paps.        7/30/2024    10:23 AM 7/18/2023     2:36 PM 9/19/2022    10:35 AM   PAP / HPV   PAP Negative for Intraepithelial Lesion or Malignancy (NILM)  Negative for Intraepithelial Lesion or Malignancy (NILM)  Negative for Intraepithelial Lesion or Malignancy (NILM)            10/21/2024   Contraception/Family Planning   Questions about contraception or family planning No           Reviewed and updated as needed this visit by Provider   Tobacco  Allergies  Meds  Problems  Med Hx  Surg Hx  Fam Hx                Review of Systems  Constitutional, HEENT, cardiovascular, pulmonary, GI, , musculoskeletal, neuro, skin, endocrine and psych systems are negative, except as otherwise noted.       Objective    Exam  /84   Pulse 74   Temp 98.4  F (36.9  C) (Oral)   Resp 16   Ht 1.74 m (5' 8.5\")   Wt 68.5 kg (151 lb)   SpO2 100%   BMI 22.62 kg/m     Estimated body mass index is 22.62 kg/m  as calculated from the following:    " "Height as of this encounter: 1.74 m (5' 8.5\").    Weight as of this encounter: 68.5 kg (151 lb).    Physical Exam    GENERAL: alert and no distress  EYES: Eyes grossly normal to inspection, PERRL and conjunctivae and sclerae normal  HENT: ear canals and TM's normal, nose and mouth without ulcers or lesions  NECK: no adenopathy, no asymmetry, masses, or scars  RESP: lungs clear to auscultation - no rales, rhonchi or wheezes  CV: regular rate and rhythm, normal S1 S2, no S3 or S4, no murmur, click or rub, no peripheral edema  ABDOMEN: soft, nontender, no hepatosplenomegaly, no masses and bowel sounds normal  MS: no gross musculoskeletal defects noted, no edema  SKIN: no suspicious lesions or rashes  NEURO: Normal strength and tone, mentation intact and speech normal  PSYCH: mentation appears normal, affect normal/bright        Signed Electronically by: Carolyn Dsouza, DO    "

## 2024-10-25 LAB
ALBUMIN SERPL BCG-MCNC: 4.5 G/DL (ref 3.5–5.2)
ALP SERPL-CCNC: 46 U/L (ref 40–150)
ALT SERPL W P-5'-P-CCNC: 18 U/L (ref 0–50)
ANION GAP SERPL CALCULATED.3IONS-SCNC: 12 MMOL/L (ref 7–15)
AST SERPL W P-5'-P-CCNC: 18 U/L (ref 0–45)
BILIRUB SERPL-MCNC: 0.4 MG/DL
BUN SERPL-MCNC: 8 MG/DL (ref 6–20)
CALCIUM SERPL-MCNC: 9.5 MG/DL (ref 8.8–10.4)
CHLORIDE SERPL-SCNC: 103 MMOL/L (ref 98–107)
CHOLEST SERPL-MCNC: 163 MG/DL
CREAT SERPL-MCNC: 0.77 MG/DL (ref 0.51–0.95)
EGFRCR SERPLBLD CKD-EPI 2021: >90 ML/MIN/1.73M2
FASTING STATUS PATIENT QL REPORTED: NO
FASTING STATUS PATIENT QL REPORTED: NO
FERRITIN SERPL-MCNC: 288 NG/ML (ref 6–175)
GLUCOSE SERPL-MCNC: 79 MG/DL (ref 70–99)
HCO3 SERPL-SCNC: 23 MMOL/L (ref 22–29)
HDLC SERPL-MCNC: 58 MG/DL
IRON BINDING CAPACITY (ROCHE): 371 UG/DL (ref 240–430)
IRON SATN MFR SERPL: 36 % (ref 15–46)
IRON SERPL-MCNC: 134 UG/DL (ref 37–145)
LDLC SERPL CALC-MCNC: 59 MG/DL
NONHDLC SERPL-MCNC: 105 MG/DL
POTASSIUM SERPL-SCNC: 3.8 MMOL/L (ref 3.4–5.3)
PROT SERPL-MCNC: 7.2 G/DL (ref 6.4–8.3)
SODIUM SERPL-SCNC: 138 MMOL/L (ref 135–145)
TRIGL SERPL-MCNC: 229 MG/DL
VIT B12 SERPL-MCNC: 226 PG/ML (ref 232–1245)

## 2024-10-28 NOTE — RESULT ENCOUNTER NOTE
The ASCVD Risk score ([place] DK, et al., 2019) failed to calculate for the following reasons:    The 2019 ASCVD risk score is only valid for ages 40 to 79   Patient updated by [employer] message with lab results.       Nicky James,  Thanks again for coming into the clinic. It was nice to see you. Your labs have returned.  1. Your B12 is again below normal limits. I would recommend continuing supplementation. Let me know if you would like me to send in a prescription for B12, otherwise it is available over the counter.  2. Your cholesterol panel looks good.  3. Ferritin level is normalizing. Your iron saturation index is low, so labs are not consistent with iron overload. Sometimes ferritin levels are elevated with liver disease, though your liver enzymes are normal. As this is trending down, I recommend we continue to monitor.  4. CBC (blood cell counts) and metabolic panel (kidney, liver, electrolytes, sugar) normal.   Please reach out by [employer] with any follow up questions or concerns.  Carolyn Dsouza, DO

## 2024-11-02 DIAGNOSIS — E28.39 PREMATURE OVARIAN FAILURE: ICD-10-CM

## 2024-11-04 RX ORDER — NORGESTREL AND ETHINYL ESTRADIOL 0.3-0.03MG
1 KIT ORAL DAILY
Qty: 84 TABLET | Refills: 4 | Status: SHIPPED | OUTPATIENT
Start: 2024-11-04

## 2024-11-05 NOTE — TELEPHONE ENCOUNTER
Premature ovarian failure  - norgestrel-ethinyl estradiol (TURQOZ) 0.3-30 MG-MCG tablet; TAKE ONE TABLET BY MOUTH ONCE DAILY  Dispense: 84 tablet; Refill: 4     Recently discussed at appointment. Refill sent to pharmacy.    Carolyn Dsouza, DO

## 2025-03-18 ENCOUNTER — OFFICE VISIT (OUTPATIENT)
Dept: FAMILY MEDICINE | Facility: CLINIC | Age: 40
End: 2025-03-18
Payer: COMMERCIAL

## 2025-03-18 VITALS
DIASTOLIC BLOOD PRESSURE: 89 MMHG | SYSTOLIC BLOOD PRESSURE: 138 MMHG | TEMPERATURE: 98.3 F | RESPIRATION RATE: 16 BRPM | HEIGHT: 69 IN | HEART RATE: 72 BPM | WEIGHT: 150 LBS | OXYGEN SATURATION: 98 % | BODY MASS INDEX: 22.22 KG/M2

## 2025-03-18 DIAGNOSIS — N64.4 BREAST TENDERNESS: Primary | ICD-10-CM

## 2025-03-18 PROCEDURE — 3075F SYST BP GE 130 - 139MM HG: CPT | Performed by: FAMILY MEDICINE

## 2025-03-18 PROCEDURE — 3079F DIAST BP 80-89 MM HG: CPT | Performed by: FAMILY MEDICINE

## 2025-03-18 PROCEDURE — 99213 OFFICE O/P EST LOW 20 MIN: CPT | Performed by: FAMILY MEDICINE

## 2025-03-18 NOTE — PROGRESS NOTES
"  Assessment & Plan     1. Breast tenderness (Primary)  - MA Diagnostic Bilateral w/ Pedro; Future  - US Breast Left Limited 1-3 Quadrants; Future      Karo presents today for evaluation of focal breast pain.  Symptoms are persistent.  No obvious abnormalities on examination, though she does have fairly dense fibrocystic breast tissue.  No nipple discharge present.  No axillary lymphadenopathy.  No clear association with food intake, clothing, or activity.    Recommend proceeding with imaging to better evaluate symptoms and rule out underlying malignancy or mass.    Use Tylenol and/or ibuprofen for discomfort.    The longitudinal plan of care for the diagnosis(es)/condition(s) as documented were addressed during this visit. Due to the added complexity in care, I will continue to support Erika in the subsequent management and with ongoing continuity of care.     Subjective   Erika is a 39 year old, presenting for the following health issues:  Pain (Slight pain in her left breast, feels slightly tender in one spot but can not feel anything)      3/18/2025     1:40 PM   Additional Questions   Roomed by Jonnie GLEZ MA     Pain    History of Present Illness       Reason for visit:  Breast pain on left breast  Symptom onset:  More than a month  Symptoms include:  Just slight breast pain  Symptom intensity:  Mild  Symptom progression:  Staying the same  Had these symptoms before:  No She is missing 1 dose(s) of medications per week.  She is not taking prescribed medications regularly due to remembering to take.         Breast tenderness off-and-on over the last few months.  Does not correlate with activity or foods.  On continuous OCPs.  Does not correlate with menstrual cycle.  No palpable lump.  No nipple discharge.  Concern given persistent symptoms.      Review of Systems  See HPI above.       Objective    /89   Pulse 72   Temp 98.3  F (36.8  C) (Oral)   Resp 16   Ht 1.74 m (5' 8.5\")   Wt 68 kg (150 " lb)   SpO2 98%   BMI 22.48 kg/m    Body mass index is 22.48 kg/m .  Physical Exam     GENERAL: alert and no distress  RESP: lungs clear to auscultation - no rales, rhonchi or wheezes  BREAST: normal without masses, tenderness or nipple discharge and no palpable axillary masses or adenopathy  CV: regular rate and rhythm, normal S1 S2, no S3 or S4, no murmur, click or rub, no peripheral edema            Signed Electronically by: Carolyn Dsouza, DO

## 2025-03-25 ENCOUNTER — ANCILLARY PROCEDURE (OUTPATIENT)
Dept: MAMMOGRAPHY | Facility: CLINIC | Age: 40
End: 2025-03-25
Attending: FAMILY MEDICINE
Payer: COMMERCIAL

## 2025-03-25 DIAGNOSIS — N64.4 BREAST TENDERNESS: ICD-10-CM

## 2025-03-25 PROCEDURE — 77062 BREAST TOMOSYNTHESIS BI: CPT

## 2025-03-25 PROCEDURE — 77066 DX MAMMO INCL CAD BI: CPT

## 2025-03-25 PROCEDURE — 76642 ULTRASOUND BREAST LIMITED: CPT | Mod: LT

## 2025-08-05 ENCOUNTER — ONCOLOGY VISIT (OUTPATIENT)
Dept: ONCOLOGY | Facility: CLINIC | Age: 40
End: 2025-08-05
Attending: NURSE PRACTITIONER
Payer: COMMERCIAL

## 2025-08-05 VITALS
WEIGHT: 148 LBS | BODY MASS INDEX: 21.92 KG/M2 | OXYGEN SATURATION: 100 % | DIASTOLIC BLOOD PRESSURE: 82 MMHG | HEIGHT: 69 IN | HEART RATE: 81 BPM | RESPIRATION RATE: 16 BRPM | SYSTOLIC BLOOD PRESSURE: 129 MMHG | TEMPERATURE: 97.9 F

## 2025-08-05 DIAGNOSIS — R91.8 PULMONARY NODULES: ICD-10-CM

## 2025-08-05 DIAGNOSIS — Z85.41 HX OF CERVICAL CANCER: Primary | ICD-10-CM

## 2025-08-05 PROCEDURE — 99214 OFFICE O/P EST MOD 30 MIN: CPT | Performed by: NURSE PRACTITIONER

## 2025-08-05 PROCEDURE — 99213 OFFICE O/P EST LOW 20 MIN: CPT | Performed by: NURSE PRACTITIONER

## 2025-08-05 ASSESSMENT — PAIN SCALES - GENERAL: PAINLEVEL_OUTOF10: NO PAIN (0)

## 2025-08-11 LAB
BKR LAB AP GYN ADEQUACY: ABNORMAL
BKR LAB AP GYN INTERPRETATION: ABNORMAL
BKR LAB AP GYN OTHER FINDINGS: ABNORMAL
BKR LAB AP HPV REFLEX: NO
BKR LAB AP PREVIOUS ABNORMAL: ABNORMAL
PATH REPORT.COMMENTS IMP SPEC: ABNORMAL
PATH REPORT.COMMENTS IMP SPEC: ABNORMAL
PATH REPORT.RELEVANT HX SPEC: ABNORMAL

## 2025-08-12 ENCOUNTER — HOSPITAL ENCOUNTER (OUTPATIENT)
Dept: CT IMAGING | Facility: HOSPITAL | Age: 40
Discharge: HOME | End: 2025-08-12
Attending: NURSE PRACTITIONER
Payer: COMMERCIAL

## 2025-08-12 DIAGNOSIS — Z85.41 HX OF CERVICAL CANCER: ICD-10-CM

## 2025-08-12 DIAGNOSIS — R91.8 PULMONARY NODULES: ICD-10-CM

## 2025-08-12 PROCEDURE — 250N000011 HC RX IP 250 OP 636: Performed by: NURSE PRACTITIONER

## 2025-08-12 PROCEDURE — 74177 CT ABD & PELVIS W/CONTRAST: CPT

## 2025-08-12 RX ORDER — IOPAMIDOL 755 MG/ML
90 INJECTION, SOLUTION INTRAVASCULAR ONCE
Status: COMPLETED | OUTPATIENT
Start: 2025-08-12 | End: 2025-08-12

## 2025-08-12 RX ADMIN — IOPAMIDOL 90 ML: 755 INJECTION, SOLUTION INTRAVENOUS at 10:52

## (undated) DEVICE — GLOVE PROTEXIS BLUE W/NEU-THERA 6.5  2D73EB65

## (undated) DEVICE — DRAPE STERI TOWEL LG 1010

## (undated) DEVICE — TOURNIQUET SGL BLADDER 18"X4" RED 5921-218-135

## (undated) DEVICE — SUCTION MANIFOLD NEPTUNE 2 SYS 1 PORT 702-025-000

## (undated) DEVICE — BRUSH SURGICAL SCRUB W/4% CHG SOL 25ML 371073

## (undated) DEVICE — SLING ARM MED 79-99155

## (undated) DEVICE — Device

## (undated) DEVICE — SOL NACL 0.9% IRRIG 1000ML BOTTLE 2F7124

## (undated) DEVICE — CAST PLASTER SPLINT 4X15" 7394

## (undated) DEVICE — GLOVE PROTEXIS POWDER FREE SMT 6.5  2D72PT65X

## (undated) DEVICE — IMPLANTABLE DEVICE
Type: IMPLANTABLE DEVICE | Site: HAND | Status: NON-FUNCTIONAL
Removed: 2020-01-07

## (undated) DEVICE — SU MONOCRYL 4-0 PS-2 18" UND Y496G

## (undated) DEVICE — PREP CHLORAPREP 26ML TINTED ORANGE  260815

## (undated) DEVICE — PACK HAND CUSTOM ASC

## (undated) DEVICE — COVER CAMERA IN-LIGHT DISP LT-C02

## (undated) DEVICE — SU VICRYL 3-0 SH 27" UND J416H

## (undated) DEVICE — CAST PADDING 3" STERILE 9043S

## (undated) DEVICE — DRSG STERI STRIP 1/2X4" R1547

## (undated) DEVICE — DRAPE C-ARM OEC MINI VIEW 6800   00-901917-01

## (undated) DEVICE — DRSG KERLIX FLUFFS X5

## (undated) DEVICE — LINEN ORTHO PACK 5446

## (undated) DEVICE — PAD CHUX UNDERPAD 30X30"

## (undated) RX ORDER — PROPOFOL 10 MG/ML
INJECTION, EMULSION INTRAVENOUS
Status: DISPENSED
Start: 2020-01-07

## (undated) RX ORDER — KETAMINE HCL IN 0.9 % NACL 50 MG/5 ML
SYRINGE (ML) INTRAVENOUS
Status: DISPENSED
Start: 2020-01-07

## (undated) RX ORDER — BUPIVACAINE HYDROCHLORIDE 5 MG/ML
INJECTION, SOLUTION EPIDURAL; INTRACAUDAL
Status: DISPENSED
Start: 2020-01-07

## (undated) RX ORDER — LIDOCAINE HYDROCHLORIDE 10 MG/ML
INJECTION, SOLUTION EPIDURAL; INFILTRATION; INTRACAUDAL; PERINEURAL
Status: DISPENSED
Start: 2020-01-07

## (undated) RX ORDER — LIDOCAINE HYDROCHLORIDE 20 MG/ML
INJECTION, SOLUTION EPIDURAL; INFILTRATION; INTRACAUDAL; PERINEURAL
Status: DISPENSED
Start: 2020-01-07

## (undated) RX ORDER — FENTANYL CITRATE 50 UG/ML
INJECTION, SOLUTION INTRAMUSCULAR; INTRAVENOUS
Status: DISPENSED
Start: 2020-01-07

## (undated) RX ORDER — DEXAMETHASONE SODIUM PHOSPHATE 4 MG/ML
INJECTION, SOLUTION INTRA-ARTICULAR; INTRALESIONAL; INTRAMUSCULAR; INTRAVENOUS; SOFT TISSUE
Status: DISPENSED
Start: 2020-01-07

## (undated) RX ORDER — ONDANSETRON 2 MG/ML
INJECTION INTRAMUSCULAR; INTRAVENOUS
Status: DISPENSED
Start: 2020-01-07

## (undated) RX ORDER — CEFAZOLIN SODIUM 2 G/50ML
SOLUTION INTRAVENOUS
Status: DISPENSED
Start: 2020-01-07

## (undated) RX ORDER — KETOROLAC TROMETHAMINE 30 MG/ML
INJECTION, SOLUTION INTRAMUSCULAR; INTRAVENOUS
Status: DISPENSED
Start: 2020-01-07

## (undated) RX ORDER — GLYCOPYRROLATE 0.2 MG/ML
INJECTION INTRAMUSCULAR; INTRAVENOUS
Status: DISPENSED
Start: 2020-01-07

## (undated) RX ORDER — PHENYLEPHRINE HCL IN 0.9% NACL 1 MG/10 ML
SYRINGE (ML) INTRAVENOUS
Status: DISPENSED
Start: 2020-01-07